# Patient Record
Sex: MALE | Race: WHITE | NOT HISPANIC OR LATINO | Employment: OTHER | URBAN - METROPOLITAN AREA
[De-identification: names, ages, dates, MRNs, and addresses within clinical notes are randomized per-mention and may not be internally consistent; named-entity substitution may affect disease eponyms.]

---

## 2017-02-21 ENCOUNTER — ALLSCRIPTS OFFICE VISIT (OUTPATIENT)
Dept: OTHER | Facility: OTHER | Age: 82
End: 2017-02-21

## 2017-08-28 ENCOUNTER — ALLSCRIPTS OFFICE VISIT (OUTPATIENT)
Dept: OTHER | Facility: OTHER | Age: 82
End: 2017-08-28

## 2017-12-24 ENCOUNTER — HOSPITAL ENCOUNTER (EMERGENCY)
Facility: HOSPITAL | Age: 82
Discharge: HOME | End: 2017-12-24
Attending: EMERGENCY MEDICINE
Payer: COMMERCIAL

## 2017-12-24 VITALS
OXYGEN SATURATION: 98 % | DIASTOLIC BLOOD PRESSURE: 84 MMHG | SYSTOLIC BLOOD PRESSURE: 132 MMHG | TEMPERATURE: 98 F | RESPIRATION RATE: 76 BRPM
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
## 2017-12-24 DIAGNOSIS — T14.8XXA ABRASION: ICD-10-CM

## 2017-12-24 PROCEDURE — 99284 EMERGENCY DEPT VISIT MOD MDM: CPT
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
## 2018-01-01 ENCOUNTER — OFFICE VISIT (OUTPATIENT)
Dept: FAMILY MEDICINE CLINIC | Facility: CLINIC | Age: 83
End: 2018-01-01
Payer: MEDICARE

## 2018-01-01 VITALS
TEMPERATURE: 97.6 F | RESPIRATION RATE: 22 BRPM | HEART RATE: 84 BPM | SYSTOLIC BLOOD PRESSURE: 120 MMHG | WEIGHT: 186 LBS | HEIGHT: 65 IN | DIASTOLIC BLOOD PRESSURE: 70 MMHG | BODY MASS INDEX: 30.99 KG/M2

## 2018-01-01 DIAGNOSIS — F51.01 PRIMARY INSOMNIA: ICD-10-CM

## 2018-01-01 PROCEDURE — 99213 OFFICE O/P EST LOW 20 MIN: CPT | Performed by: FAMILY MEDICINE

## 2018-01-01 RX ORDER — MIRTAZAPINE 30 MG/1
30 TABLET, FILM COATED ORAL
Qty: 30 TABLET | Refills: 0 | Status: CANCELLED | OUTPATIENT
Start: 2018-01-01

## 2018-01-01 RX ORDER — MIRTAZAPINE 30 MG/1
30 TABLET, FILM COATED ORAL
Qty: 30 TABLET | Refills: 0 | Status: SHIPPED | OUTPATIENT
Start: 2018-01-01 | End: 2018-01-01 | Stop reason: SDUPTHER

## 2018-01-01 RX ORDER — MIRTAZAPINE 30 MG/1
30 TABLET, FILM COATED ORAL
Qty: 30 TABLET | Refills: 6 | Status: SHIPPED | OUTPATIENT
Start: 2018-01-01 | End: 2019-01-01 | Stop reason: SDUPTHER

## 2018-01-01 RX ORDER — MIRTAZAPINE 30 MG/1
TABLET, FILM COATED ORAL
Qty: 30 TABLET | Refills: 0 | Status: CANCELLED | OUTPATIENT
Start: 2018-01-01

## 2018-01-01 RX ORDER — QUETIAPINE FUMARATE 100 MG/1
100 TABLET, FILM COATED ORAL
Qty: 30 TABLET | Refills: 0 | Status: CANCELLED | OUTPATIENT
Start: 2018-01-01

## 2018-01-01 RX ORDER — QUETIAPINE FUMARATE 100 MG/1
100 TABLET, FILM COATED ORAL
Qty: 30 TABLET | Refills: 0 | Status: SHIPPED | OUTPATIENT
Start: 2018-01-01 | End: 2018-01-01 | Stop reason: SDUPTHER

## 2018-01-01 RX ORDER — QUETIAPINE FUMARATE 100 MG/1
100 TABLET, FILM COATED ORAL
Qty: 30 TABLET | Refills: 6 | Status: SHIPPED | OUTPATIENT
Start: 2018-01-01 | End: 2019-01-01 | Stop reason: SDUPTHER

## 2018-01-01 RX ORDER — QUETIAPINE FUMARATE 100 MG/1
TABLET, FILM COATED ORAL
Qty: 30 TABLET | Refills: 0 | Status: CANCELLED | OUTPATIENT
Start: 2018-01-01

## 2018-01-13 VITALS
HEIGHT: 65 IN | SYSTOLIC BLOOD PRESSURE: 142 MMHG | BODY MASS INDEX: 30.05 KG/M2 | RESPIRATION RATE: 20 BRPM | TEMPERATURE: 97 F | DIASTOLIC BLOOD PRESSURE: 88 MMHG | HEART RATE: 92 BPM | WEIGHT: 180.38 LBS

## 2018-01-14 VITALS
WEIGHT: 183 LBS | RESPIRATION RATE: 22 BRPM | DIASTOLIC BLOOD PRESSURE: 90 MMHG | SYSTOLIC BLOOD PRESSURE: 140 MMHG | HEIGHT: 65 IN | BODY MASS INDEX: 30.49 KG/M2 | TEMPERATURE: 97.6 F | HEART RATE: 88 BPM

## 2018-02-13 ENCOUNTER — OFFICE VISIT (OUTPATIENT)
Dept: FAMILY MEDICINE CLINIC | Facility: CLINIC | Age: 83
End: 2018-02-13
Payer: COMMERCIAL

## 2018-02-13 VITALS
SYSTOLIC BLOOD PRESSURE: 130 MMHG | BODY MASS INDEX: 30.95 KG/M2 | HEART RATE: 88 BPM | TEMPERATURE: 97.9 F | WEIGHT: 186 LBS | DIASTOLIC BLOOD PRESSURE: 80 MMHG | RESPIRATION RATE: 20 BRPM

## 2018-02-13 DIAGNOSIS — H61.23 BILATERAL IMPACTED CERUMEN: Primary | ICD-10-CM

## 2018-02-13 PROCEDURE — 99213 OFFICE O/P EST LOW 20 MIN: CPT | Performed by: FAMILY MEDICINE

## 2018-02-13 RX ORDER — MIRTAZAPINE 30 MG/1
TABLET, FILM COATED ORAL
Refills: 0 | COMMUNITY
Start: 2018-01-24 | End: 2018-03-26 | Stop reason: SDUPTHER

## 2018-02-13 RX ORDER — QUETIAPINE FUMARATE 100 MG/1
TABLET, FILM COATED ORAL
Refills: 0 | COMMUNITY
Start: 2018-01-24 | End: 2018-03-26 | Stop reason: SDUPTHER

## 2018-02-13 NOTE — PROGRESS NOTES
Subjective:      Eboni Ya is a 81 y o  male whom I am asked to see for evaluation of diminished hearing in both ears for the past 3 days  There is a prior history of cerumen impaction  The patient has not been using ear drops to loosen wax immediately prior to this visit  The patient denies ear pain  The following portions of the patient's history were reviewed and updated as appropriate: allergies, current medications, past family history, past medical history, past social history, past surgical history and problem list     Review of Systems  Constitutional: negative  Eyes: negative  Ears, nose, mouth, throat, and face: negative for ear drainage, earaches, nasal congestion, sore throat and voice change  Respiratory: negative      Objective: Auditory canal(s) of both ears are completely obstructed with cerumen  Cerumen was removed using gentle irrigation  Tympanic membranes are intact following the procedure -  On L side, R TM is not visible since ear flush was unsuccessful  Auditory canals are normal       Assessment:      Cerumen Impaction without otitis externa  Plan:      1  Care instructions given  2  Home treatment: [facility]  5 dropps in R ear 3 times a day for 3 days   3  Follow-up as needed

## 2018-03-26 DIAGNOSIS — F51.01 PRIMARY INSOMNIA: Primary | ICD-10-CM

## 2018-03-26 RX ORDER — QUETIAPINE FUMARATE 100 MG/1
100 TABLET, FILM COATED ORAL
Qty: 30 TABLET | Refills: 6 | Status: SHIPPED | OUTPATIENT
Start: 2018-03-26 | End: 2018-01-01 | Stop reason: SDUPTHER

## 2018-03-26 RX ORDER — MIRTAZAPINE 30 MG/1
30 TABLET, FILM COATED ORAL
Qty: 30 TABLET | Refills: 6 | Status: SHIPPED | OUTPATIENT
Start: 2018-03-26 | End: 2018-01-01 | Stop reason: SDUPTHER

## 2018-12-18 NOTE — PROGRESS NOTES
Chief Complaint   Patient presents with    Follow-up     chronic conditions        Patient ID: Shelley Hahn is a 81 y o  male  HPI  Pt is seeing for f/u Insomnia -  Stable on meds, declined labs/fluvaccine/ prevnar 13     The following portions of the patient's history were reviewed and updated as appropriate: allergies, current medications, past family history, past medical history, past social history, past surgical history and problem list     Review of Systems   Constitutional: Negative  Respiratory: Negative  Cardiovascular: Negative  Gastrointestinal: Negative  Genitourinary: Negative  Musculoskeletal: Negative  Skin: Negative  Neurological: Negative  Psychiatric/Behavioral: Positive for sleep disturbance  Negative for dysphoric mood  The patient is not nervous/anxious  Current Outpatient Prescriptions   Medication Sig Dispense Refill    mirtazapine (REMERON) 30 mg tablet Take 1 tablet (30 mg total) by mouth daily at bedtime 30 tablet 6    QUEtiapine (SEROquel) 100 mg tablet Take 1 tablet (100 mg total) by mouth daily at bedtime 30 tablet 6     No current facility-administered medications for this visit  Objective:    /70 (BP Location: Right arm, Patient Position: Sitting, Cuff Size: Standard)   Pulse 84   Temp 97 6 °F (36 4 °C) (Tympanic)   Resp 22   Ht 5' 5" (1 651 m)   Wt 84 4 kg (186 lb)   BMI 30 95 kg/m²        Physical Exam   Constitutional: No distress  Cardiovascular: Normal rate and regular rhythm  No murmur heard  Pulmonary/Chest: Effort normal  No respiratory distress  He has no wheezes  He has no rales  Neurological: No cranial nerve deficit  Psychiatric: He has a normal mood and affect  Assessment/Plan:         Diagnoses and all orders for this visit:    Primary insomnia  -     mirtazapine (REMERON) 30 mg tablet; Take 1 tablet (30 mg total) by mouth daily at bedtime  -     QUEtiapine (SEROquel) 100 mg tablet;  Take 1 tablet (100 mg total) by mouth daily at bedtime          Stable    rto in 6  m                   Leydi Chavez MD

## 2019-01-01 ENCOUNTER — APPOINTMENT (INPATIENT)
Dept: RADIOLOGY | Facility: HOSPITAL | Age: 84
DRG: 064 | End: 2019-01-01
Payer: MEDICARE

## 2019-01-01 ENCOUNTER — APPOINTMENT (EMERGENCY)
Dept: RADIOLOGY | Facility: HOSPITAL | Age: 84
DRG: 064 | End: 2019-01-01
Payer: MEDICARE

## 2019-01-01 ENCOUNTER — HOSPITAL ENCOUNTER (OUTPATIENT)
Facility: HOSPITAL | Age: 84
End: 2019-07-29
Attending: STUDENT IN AN ORGANIZED HEALTH CARE EDUCATION/TRAINING PROGRAM | Admitting: STUDENT IN AN ORGANIZED HEALTH CARE EDUCATION/TRAINING PROGRAM

## 2019-01-01 ENCOUNTER — APPOINTMENT (INPATIENT)
Dept: NON INVASIVE DIAGNOSTICS | Facility: HOSPITAL | Age: 84
DRG: 064 | End: 2019-01-01
Payer: MEDICARE

## 2019-01-01 ENCOUNTER — EPISODE CHANGES (OUTPATIENT)
Dept: CASE MANAGEMENT | Facility: HOSPITAL | Age: 84
End: 2019-01-01

## 2019-01-01 ENCOUNTER — TELEPHONE (OUTPATIENT)
Dept: VASCULAR SURGERY | Facility: CLINIC | Age: 84
End: 2019-01-01

## 2019-01-01 ENCOUNTER — APPOINTMENT (INPATIENT)
Dept: RADIOLOGY | Facility: HOSPITAL | Age: 84
DRG: 064 | End: 2019-01-01
Attending: INTERNAL MEDICINE
Payer: MEDICARE

## 2019-01-01 ENCOUNTER — HOSPITAL ENCOUNTER (INPATIENT)
Facility: HOSPITAL | Age: 84
LOS: 9 days | DRG: 064 | End: 2019-07-26
Attending: EMERGENCY MEDICINE | Admitting: ANESTHESIOLOGY
Payer: MEDICARE

## 2019-01-01 VITALS
TEMPERATURE: 98.8 F | RESPIRATION RATE: 36 BRPM | WEIGHT: 175 LBS | HEART RATE: 89 BPM | HEIGHT: 70 IN | OXYGEN SATURATION: 89 % | DIASTOLIC BLOOD PRESSURE: 65 MMHG | SYSTOLIC BLOOD PRESSURE: 111 MMHG | BODY MASS INDEX: 25.05 KG/M2

## 2019-01-01 VITALS
DIASTOLIC BLOOD PRESSURE: 81 MMHG | SYSTOLIC BLOOD PRESSURE: 127 MMHG | TEMPERATURE: 97.5 F | HEART RATE: 83 BPM | OXYGEN SATURATION: 97 % | HEIGHT: 66 IN | BODY MASS INDEX: 28.12 KG/M2 | RESPIRATION RATE: 20 BRPM | WEIGHT: 175 LBS

## 2019-01-01 DIAGNOSIS — I63.311 CEREBROVASCULAR ACCIDENT (CVA) DUE TO THROMBOSIS OF RIGHT MIDDLE CEREBRAL ARTERY (HCC): ICD-10-CM

## 2019-01-01 DIAGNOSIS — I63.9 CVA (CEREBRAL VASCULAR ACCIDENT) (HCC): Primary | ICD-10-CM

## 2019-01-01 DIAGNOSIS — I65.29 CAROTID STENOSIS: ICD-10-CM

## 2019-01-01 DIAGNOSIS — Z43.1 ENCOUNTER FOR PEG (PERCUTANEOUS ENDOSCOPIC GASTROSTOMY) (HCC): ICD-10-CM

## 2019-01-01 DIAGNOSIS — J96.01 ACUTE RESPIRATORY FAILURE WITH HYPOXIA (HCC): ICD-10-CM

## 2019-01-01 DIAGNOSIS — I48.0 PAROXYSMAL ATRIAL FIBRILLATION (HCC): ICD-10-CM

## 2019-01-01 DIAGNOSIS — I10 HIGH BLOOD PRESSURE: ICD-10-CM

## 2019-01-01 DIAGNOSIS — S32.511A CLOSED FRACTURE OF RIGHT SUPERIOR PUBIC RAMUS (HCC): ICD-10-CM

## 2019-01-01 DIAGNOSIS — F51.01 PRIMARY INSOMNIA: ICD-10-CM

## 2019-01-01 DIAGNOSIS — A41.9 SEPSIS (HCC): ICD-10-CM

## 2019-01-01 DIAGNOSIS — R13.10 IMPAIRED SWALLOWING: ICD-10-CM

## 2019-01-01 LAB
ABO GROUP BLD: NORMAL
ALBUMIN SERPL BCP-MCNC: 2.2 G/DL (ref 3.5–5)
ALBUMIN SERPL BCP-MCNC: 2.7 G/DL (ref 3.5–5)
ALBUMIN SERPL BCP-MCNC: 3 G/DL (ref 3.5–5)
ALBUMIN SERPL BCP-MCNC: 3.2 G/DL (ref 3.5–5)
ALP SERPL-CCNC: 68 U/L (ref 46–116)
ALP SERPL-CCNC: 76 U/L (ref 46–116)
ALP SERPL-CCNC: 88 U/L (ref 46–116)
ALP SERPL-CCNC: 92 U/L (ref 46–116)
ALT SERPL W P-5'-P-CCNC: 20 U/L (ref 12–78)
ALT SERPL W P-5'-P-CCNC: 24 U/L (ref 12–78)
ALT SERPL W P-5'-P-CCNC: 35 U/L (ref 12–78)
ALT SERPL W P-5'-P-CCNC: 38 U/L (ref 12–78)
ANION GAP SERPL CALCULATED.3IONS-SCNC: 10 MMOL/L (ref 4–13)
ANION GAP SERPL CALCULATED.3IONS-SCNC: 11 MMOL/L (ref 4–13)
ANION GAP SERPL CALCULATED.3IONS-SCNC: 11 MMOL/L (ref 4–13)
ANION GAP SERPL CALCULATED.3IONS-SCNC: 13 MMOL/L (ref 4–13)
ANION GAP SERPL CALCULATED.3IONS-SCNC: 15 MMOL/L (ref 4–13)
ANION GAP SERPL CALCULATED.3IONS-SCNC: 8 MMOL/L (ref 4–13)
ANION GAP SERPL CALCULATED.3IONS-SCNC: 9 MMOL/L (ref 4–13)
APTT PPP: 25 SECONDS (ref 24–33)
APTT PPP: 27 SECONDS (ref 23–37)
APTT PPP: 27 SECONDS (ref 24–33)
APTT PPP: 29 SECONDS (ref 24–33)
APTT PPP: 30 SECONDS (ref 23–37)
APTT PPP: 30 SECONDS (ref 24–33)
APTT PPP: 31 SECONDS (ref 23–37)
APTT PPP: 31 SECONDS (ref 24–33)
APTT PPP: 34 SECONDS (ref 23–37)
APTT PPP: 36 SECONDS (ref 23–37)
APTT PPP: 36 SECONDS (ref 24–33)
APTT PPP: 37 SECONDS (ref 24–33)
APTT PPP: 37 SECONDS (ref 24–33)
APTT PPP: 38 SECONDS (ref 23–37)
APTT PPP: 40 SECONDS (ref 23–37)
APTT PPP: 55 SECONDS (ref 23–37)
APTT PPP: 70 SECONDS (ref 23–37)
APTT PPP: 78 SECONDS (ref 24–33)
APTT PPP: 79 SECONDS (ref 23–37)
AST SERPL W P-5'-P-CCNC: 49 U/L (ref 5–45)
AST SERPL W P-5'-P-CCNC: 72 U/L (ref 5–45)
AST SERPL W P-5'-P-CCNC: 74 U/L (ref 5–45)
AST SERPL W P-5'-P-CCNC: 78 U/L (ref 5–45)
ATRIAL RATE: 74 BPM
ATRIAL RATE: 98 BPM
BACTERIA BLD CULT: NORMAL
BACTERIA BLD CULT: NORMAL
BACTERIA UR QL AUTO: ABNORMAL /HPF
BACTERIA UR QL AUTO: ABNORMAL /HPF
BASOPHILS # BLD AUTO: 0.03 THOUSANDS/ΜL (ref 0–0.1)
BASOPHILS # BLD AUTO: 0.04 THOUSANDS/ΜL (ref 0–0.1)
BASOPHILS # BLD AUTO: 0.06 THOUSANDS/ΜL (ref 0–0.1)
BASOPHILS # BLD AUTO: 0.07 THOUSANDS/ΜL (ref 0–0.1)
BASOPHILS # BLD AUTO: 0.08 THOUSANDS/ΜL (ref 0–0.1)
BASOPHILS # BLD MANUAL: 0 THOUSAND/UL (ref 0–0.1)
BASOPHILS # BLD MANUAL: 0.17 THOUSAND/UL (ref 0–0.1)
BASOPHILS NFR BLD AUTO: 0 % (ref 0–1)
BASOPHILS NFR BLD AUTO: 1 % (ref 0–1)
BASOPHILS NFR MAR MANUAL: 0 % (ref 0–1)
BASOPHILS NFR MAR MANUAL: 2 % (ref 0–1)
BILIRUB DIRECT SERPL-MCNC: 0.3 MG/DL (ref 0–0.2)
BILIRUB SERPL-MCNC: 0.4 MG/DL (ref 0.2–1)
BILIRUB SERPL-MCNC: 0.7 MG/DL (ref 0.2–1)
BILIRUB SERPL-MCNC: 0.8 MG/DL (ref 0.2–1)
BILIRUB SERPL-MCNC: 0.9 MG/DL (ref 0.2–1)
BILIRUB UR QL STRIP: NEGATIVE
BILIRUB UR QL STRIP: NEGATIVE
BLD GP AB SCN SERPL QL: NEGATIVE
BUN SERPL-MCNC: 16 MG/DL (ref 5–25)
BUN SERPL-MCNC: 16 MG/DL (ref 5–25)
BUN SERPL-MCNC: 17 MG/DL (ref 5–25)
BUN SERPL-MCNC: 17 MG/DL (ref 5–25)
BUN SERPL-MCNC: 19 MG/DL (ref 5–25)
BUN SERPL-MCNC: 20 MG/DL (ref 5–25)
BUN SERPL-MCNC: 21 MG/DL (ref 5–25)
CALCIUM SERPL-MCNC: 7.7 MG/DL (ref 8.3–10.1)
CALCIUM SERPL-MCNC: 7.9 MG/DL (ref 8.3–10.1)
CALCIUM SERPL-MCNC: 7.9 MG/DL (ref 8.3–10.1)
CALCIUM SERPL-MCNC: 8 MG/DL (ref 8.3–10.1)
CALCIUM SERPL-MCNC: 8.2 MG/DL (ref 8.3–10.1)
CALCIUM SERPL-MCNC: 8.3 MG/DL (ref 8.3–10.1)
CALCIUM SERPL-MCNC: 8.5 MG/DL (ref 8.3–10.1)
CALCIUM SERPL-MCNC: 8.6 MG/DL (ref 8.3–10.1)
CALCIUM SERPL-MCNC: 8.7 MG/DL (ref 8.3–10.1)
CALCIUM SERPL-MCNC: 8.9 MG/DL (ref 8.3–10.1)
CHLORIDE SERPL-SCNC: 104 MMOL/L (ref 100–108)
CHLORIDE SERPL-SCNC: 104 MMOL/L (ref 100–108)
CHLORIDE SERPL-SCNC: 105 MMOL/L (ref 100–108)
CHLORIDE SERPL-SCNC: 107 MMOL/L (ref 100–108)
CHLORIDE SERPL-SCNC: 107 MMOL/L (ref 100–108)
CHLORIDE SERPL-SCNC: 108 MMOL/L (ref 100–108)
CHLORIDE SERPL-SCNC: 108 MMOL/L (ref 100–108)
CHLORIDE SERPL-SCNC: 110 MMOL/L (ref 100–108)
CHLORIDE SERPL-SCNC: 110 MMOL/L (ref 100–108)
CHLORIDE SERPL-SCNC: 111 MMOL/L (ref 100–108)
CHOLEST SERPL-MCNC: 204 MG/DL (ref 50–200)
CK MB SERPL-MCNC: 0.8 NG/ML (ref 0–5)
CK MB SERPL-MCNC: 1.4 NG/ML (ref 0–5)
CK MB SERPL-MCNC: 1.6 NG/ML (ref 0–5)
CK MB SERPL-MCNC: 1.9 NG/ML (ref 0–5)
CK MB SERPL-MCNC: 10.8 NG/ML (ref 0–5)
CK MB SERPL-MCNC: 12.2 NG/ML (ref 0–5)
CK MB SERPL-MCNC: 2.3 NG/ML (ref 0–5)
CK MB SERPL-MCNC: 2.6 NG/ML (ref 0–5)
CK MB SERPL-MCNC: 4.8 NG/ML (ref 0–5)
CK MB SERPL-MCNC: <1 % (ref 0–2.5)
CK MB SERPL-MCNC: <1 NG/ML (ref 0–5)
CK SERPL-CCNC: 1153 U/L (ref 39–308)
CK SERPL-CCNC: 2071 U/L (ref 39–308)
CK SERPL-CCNC: 214 U/L (ref 39–308)
CK SERPL-CCNC: 2506 U/L (ref 39–308)
CK SERPL-CCNC: 263 U/L (ref 39–308)
CK SERPL-CCNC: 3324 U/L (ref 39–308)
CK SERPL-CCNC: 5381 U/L (ref 39–308)
CK SERPL-CCNC: 678 U/L (ref 39–308)
CK SERPL-CCNC: 6896 U/L (ref 39–308)
CK SERPL-CCNC: 850 U/L (ref 39–308)
CLARITY UR: ABNORMAL
CLARITY UR: ABNORMAL
CO2 SERPL-SCNC: 18 MMOL/L (ref 21–32)
CO2 SERPL-SCNC: 19 MMOL/L (ref 21–32)
CO2 SERPL-SCNC: 21 MMOL/L (ref 21–32)
CO2 SERPL-SCNC: 22 MMOL/L (ref 21–32)
CO2 SERPL-SCNC: 23 MMOL/L (ref 21–32)
CO2 SERPL-SCNC: 24 MMOL/L (ref 21–32)
CO2 SERPL-SCNC: 24 MMOL/L (ref 21–32)
CO2 SERPL-SCNC: 25 MMOL/L (ref 21–32)
COLOR UR: YELLOW
COLOR UR: YELLOW
CREAT SERPL-MCNC: 0.95 MG/DL (ref 0.6–1.3)
CREAT SERPL-MCNC: 0.96 MG/DL (ref 0.6–1.3)
CREAT SERPL-MCNC: 0.96 MG/DL (ref 0.6–1.3)
CREAT SERPL-MCNC: 0.98 MG/DL (ref 0.6–1.3)
CREAT SERPL-MCNC: 1.02 MG/DL (ref 0.6–1.3)
CREAT SERPL-MCNC: 1.07 MG/DL (ref 0.6–1.3)
CREAT SERPL-MCNC: 1.12 MG/DL (ref 0.6–1.3)
CREAT SERPL-MCNC: 1.21 MG/DL (ref 0.6–1.3)
CREAT SERPL-MCNC: 1.42 MG/DL (ref 0.6–1.3)
CREAT SERPL-MCNC: 1.55 MG/DL (ref 0.6–1.3)
EOSINOPHIL # BLD AUTO: 0 THOUSAND/ΜL (ref 0–0.61)
EOSINOPHIL # BLD AUTO: 0 THOUSAND/ΜL (ref 0–0.61)
EOSINOPHIL # BLD AUTO: 0.03 THOUSAND/ΜL (ref 0–0.61)
EOSINOPHIL # BLD AUTO: 0.03 THOUSAND/ΜL (ref 0–0.61)
EOSINOPHIL # BLD AUTO: 0.08 THOUSAND/ΜL (ref 0–0.61)
EOSINOPHIL # BLD AUTO: 0.11 THOUSAND/ΜL (ref 0–0.61)
EOSINOPHIL # BLD AUTO: 0.45 THOUSAND/ΜL (ref 0–0.61)
EOSINOPHIL # BLD MANUAL: 0.17 THOUSAND/UL (ref 0–0.4)
EOSINOPHIL # BLD MANUAL: 0.33 THOUSAND/UL (ref 0–0.4)
EOSINOPHIL NFR BLD AUTO: 0 % (ref 0–6)
EOSINOPHIL NFR BLD AUTO: 1 % (ref 0–6)
EOSINOPHIL NFR BLD AUTO: 1 % (ref 0–6)
EOSINOPHIL NFR BLD AUTO: 5 % (ref 0–6)
EOSINOPHIL NFR BLD MANUAL: 1 % (ref 0–6)
EOSINOPHIL NFR BLD MANUAL: 4 % (ref 0–6)
ERYTHROCYTE [DISTWIDTH] IN BLOOD BY AUTOMATED COUNT: 15.8 % (ref 11.6–15.1)
ERYTHROCYTE [DISTWIDTH] IN BLOOD BY AUTOMATED COUNT: 15.9 % (ref 11.6–15.1)
ERYTHROCYTE [DISTWIDTH] IN BLOOD BY AUTOMATED COUNT: 15.9 % (ref 11.6–15.1)
ERYTHROCYTE [DISTWIDTH] IN BLOOD BY AUTOMATED COUNT: 16.1 % (ref 11.6–15.1)
ERYTHROCYTE [DISTWIDTH] IN BLOOD BY AUTOMATED COUNT: 16.2 % (ref 11.6–15.1)
ERYTHROCYTE [DISTWIDTH] IN BLOOD BY AUTOMATED COUNT: 16.3 % (ref 11.6–15.1)
ERYTHROCYTE [DISTWIDTH] IN BLOOD BY AUTOMATED COUNT: 16.4 % (ref 11.6–15.1)
ERYTHROCYTE [DISTWIDTH] IN BLOOD BY AUTOMATED COUNT: 16.6 % (ref 11.6–15.1)
ERYTHROCYTE [DISTWIDTH] IN BLOOD BY AUTOMATED COUNT: 16.7 % (ref 11.6–15.1)
EST. AVERAGE GLUCOSE BLD GHB EST-MCNC: 126 MG/DL
GFR SERPL CREATININE-BSD FRML MDRD: 41 ML/MIN/1.73SQ M
GFR SERPL CREATININE-BSD FRML MDRD: 46 ML/MIN/1.73SQ M
GFR SERPL CREATININE-BSD FRML MDRD: 55 ML/MIN/1.73SQ M
GFR SERPL CREATININE-BSD FRML MDRD: 61 ML/MIN/1.73SQ M
GFR SERPL CREATININE-BSD FRML MDRD: 64 ML/MIN/1.73SQ M
GFR SERPL CREATININE-BSD FRML MDRD: 68 ML/MIN/1.73SQ M
GFR SERPL CREATININE-BSD FRML MDRD: 72 ML/MIN/1.73SQ M
GFR SERPL CREATININE-BSD FRML MDRD: 73 ML/MIN/1.73SQ M
GFR SERPL CREATININE-BSD FRML MDRD: 73 ML/MIN/1.73SQ M
GFR SERPL CREATININE-BSD FRML MDRD: 74 ML/MIN/1.73SQ M
GLUCOSE SERPL-MCNC: 104 MG/DL (ref 65–140)
GLUCOSE SERPL-MCNC: 105 MG/DL (ref 65–140)
GLUCOSE SERPL-MCNC: 106 MG/DL (ref 65–140)
GLUCOSE SERPL-MCNC: 109 MG/DL (ref 65–140)
GLUCOSE SERPL-MCNC: 111 MG/DL (ref 65–140)
GLUCOSE SERPL-MCNC: 115 MG/DL (ref 65–140)
GLUCOSE SERPL-MCNC: 122 MG/DL (ref 65–140)
GLUCOSE SERPL-MCNC: 132 MG/DL (ref 65–140)
GLUCOSE SERPL-MCNC: 135 MG/DL (ref 65–140)
GLUCOSE SERPL-MCNC: 37 MG/DL (ref 65–140)
GLUCOSE SERPL-MCNC: 99 MG/DL (ref 65–140)
GLUCOSE UR STRIP-MCNC: NEGATIVE MG/DL
GLUCOSE UR STRIP-MCNC: NEGATIVE MG/DL
HBA1C MFR BLD: 6 % (ref 4.2–6.3)
HCT VFR BLD AUTO: 31.1 % (ref 36.5–49.3)
HCT VFR BLD AUTO: 31.6 % (ref 36.5–49.3)
HCT VFR BLD AUTO: 31.8 % (ref 36.5–49.3)
HCT VFR BLD AUTO: 33 % (ref 36.5–49.3)
HCT VFR BLD AUTO: 33.2 % (ref 36.5–49.3)
HCT VFR BLD AUTO: 33.6 % (ref 36.5–49.3)
HCT VFR BLD AUTO: 33.7 % (ref 36.5–49.3)
HCT VFR BLD AUTO: 34.5 % (ref 36.5–49.3)
HCT VFR BLD AUTO: 35.3 % (ref 36.5–49.3)
HCT VFR BLD AUTO: 35.4 % (ref 36.5–49.3)
HCT VFR BLD AUTO: 38.3 % (ref 36.5–49.3)
HDLC SERPL-MCNC: 47 MG/DL (ref 40–60)
HGB BLD-MCNC: 10.2 G/DL (ref 12–17)
HGB BLD-MCNC: 10.3 G/DL (ref 12–17)
HGB BLD-MCNC: 10.3 G/DL (ref 12–17)
HGB BLD-MCNC: 10.4 G/DL (ref 12–17)
HGB BLD-MCNC: 10.8 G/DL (ref 12–17)
HGB BLD-MCNC: 10.8 G/DL (ref 12–17)
HGB BLD-MCNC: 11.1 G/DL (ref 12–17)
HGB BLD-MCNC: 11.2 G/DL (ref 12–17)
HGB BLD-MCNC: 11.3 G/DL (ref 12–17)
HGB BLD-MCNC: 11.3 G/DL (ref 12–17)
HGB BLD-MCNC: 12.3 G/DL (ref 12–17)
HGB UR QL STRIP.AUTO: ABNORMAL
HGB UR QL STRIP.AUTO: ABNORMAL
IMM GRANULOCYTES # BLD AUTO: 0.06 THOUSAND/UL (ref 0–0.2)
IMM GRANULOCYTES # BLD AUTO: 0.08 THOUSAND/UL (ref 0–0.2)
IMM GRANULOCYTES # BLD AUTO: 0.1 THOUSAND/UL (ref 0–0.2)
IMM GRANULOCYTES # BLD AUTO: 0.11 THOUSAND/UL (ref 0–0.2)
IMM GRANULOCYTES # BLD AUTO: 0.22 THOUSAND/UL (ref 0–0.2)
IMM GRANULOCYTES # BLD AUTO: >0.5 THOUSAND/UL (ref 0–0.2)
IMM GRANULOCYTES # BLD AUTO: >0.5 THOUSAND/UL (ref 0–0.2)
IMM GRANULOCYTES NFR BLD AUTO: 1 % (ref 0–2)
IMM GRANULOCYTES NFR BLD AUTO: 2 % (ref 0–2)
IMM GRANULOCYTES NFR BLD AUTO: 6 % (ref 0–2)
IMM GRANULOCYTES NFR BLD AUTO: 6 % (ref 0–2)
INR PPP: 1.05 (ref 0.86–1.16)
KETONES UR STRIP-MCNC: NEGATIVE MG/DL
KETONES UR STRIP-MCNC: NEGATIVE MG/DL
LACTATE SERPL-SCNC: 1.2 MMOL/L (ref 0.5–2)
LACTATE SERPL-SCNC: 1.6 MMOL/L (ref 0.5–2)
LACTATE SERPL-SCNC: 2.8 MMOL/L (ref 0.5–2)
LACTATE SERPL-SCNC: 3.3 MMOL/L (ref 0.5–2)
LDLC SERPL CALC-MCNC: 140 MG/DL (ref 0–100)
LEUKOCYTE ESTERASE UR QL STRIP: ABNORMAL
LEUKOCYTE ESTERASE UR QL STRIP: NEGATIVE
LIPASE SERPL-CCNC: 68 U/L (ref 73–393)
LYMPHOCYTES # BLD AUTO: 0.63 THOUSANDS/ΜL (ref 0.6–4.47)
LYMPHOCYTES # BLD AUTO: 1.2 THOUSANDS/ΜL (ref 0.6–4.47)
LYMPHOCYTES # BLD AUTO: 1.24 THOUSANDS/ΜL (ref 0.6–4.47)
LYMPHOCYTES # BLD AUTO: 1.35 THOUSANDS/ΜL (ref 0.6–4.47)
LYMPHOCYTES # BLD AUTO: 1.42 THOUSANDS/ΜL (ref 0.6–4.47)
LYMPHOCYTES # BLD AUTO: 1.47 THOUSANDS/ΜL (ref 0.6–4.47)
LYMPHOCYTES # BLD AUTO: 1.69 THOUSANDS/ΜL (ref 0.6–4.47)
LYMPHOCYTES # BLD AUTO: 1.82 THOUSAND/UL (ref 0.6–4.47)
LYMPHOCYTES # BLD AUTO: 1.99 THOUSAND/UL (ref 0.6–4.47)
LYMPHOCYTES # BLD AUTO: 11 % (ref 14–44)
LYMPHOCYTES # BLD AUTO: 24 % (ref 14–44)
LYMPHOCYTES NFR BLD AUTO: 10 % (ref 14–44)
LYMPHOCYTES NFR BLD AUTO: 12 % (ref 14–44)
LYMPHOCYTES NFR BLD AUTO: 14 % (ref 14–44)
LYMPHOCYTES NFR BLD AUTO: 16 % (ref 14–44)
LYMPHOCYTES NFR BLD AUTO: 18 % (ref 14–44)
LYMPHOCYTES NFR BLD AUTO: 4 % (ref 14–44)
LYMPHOCYTES NFR BLD AUTO: 9 % (ref 14–44)
MAGNESIUM SERPL-MCNC: 1.8 MG/DL (ref 1.6–2.6)
MAGNESIUM SERPL-MCNC: 1.9 MG/DL (ref 1.6–2.6)
MAGNESIUM SERPL-MCNC: 2 MG/DL (ref 1.6–2.6)
MAGNESIUM SERPL-MCNC: 2.3 MG/DL (ref 1.6–2.6)
MAGNESIUM SERPL-MCNC: 2.4 MG/DL (ref 1.6–2.6)
MCH RBC QN AUTO: 30.4 PG (ref 26.8–34.3)
MCH RBC QN AUTO: 30.5 PG (ref 26.8–34.3)
MCH RBC QN AUTO: 30.6 PG (ref 26.8–34.3)
MCH RBC QN AUTO: 30.7 PG (ref 26.8–34.3)
MCH RBC QN AUTO: 30.9 PG (ref 26.8–34.3)
MCH RBC QN AUTO: 31 PG (ref 26.8–34.3)
MCH RBC QN AUTO: 31.2 PG (ref 26.8–34.3)
MCHC RBC AUTO-ENTMCNC: 31.5 G/DL (ref 31.4–37.4)
MCHC RBC AUTO-ENTMCNC: 31.9 G/DL (ref 31.4–37.4)
MCHC RBC AUTO-ENTMCNC: 32 G/DL (ref 31.4–37.4)
MCHC RBC AUTO-ENTMCNC: 32 G/DL (ref 31.4–37.4)
MCHC RBC AUTO-ENTMCNC: 32.1 G/DL (ref 31.4–37.4)
MCHC RBC AUTO-ENTMCNC: 32.3 G/DL (ref 31.4–37.4)
MCHC RBC AUTO-ENTMCNC: 32.4 G/DL (ref 31.4–37.4)
MCHC RBC AUTO-ENTMCNC: 32.5 G/DL (ref 31.4–37.4)
MCHC RBC AUTO-ENTMCNC: 32.5 G/DL (ref 31.4–37.4)
MCHC RBC AUTO-ENTMCNC: 33 G/DL (ref 31.4–37.4)
MCHC RBC AUTO-ENTMCNC: 33.1 G/DL (ref 31.4–37.4)
MCV RBC AUTO: 94 FL (ref 82–98)
MCV RBC AUTO: 94 FL (ref 82–98)
MCV RBC AUTO: 95 FL (ref 82–98)
MCV RBC AUTO: 96 FL (ref 82–98)
MCV RBC AUTO: 97 FL (ref 82–98)
METAMYELOCYTES NFR BLD MANUAL: 1 % (ref 0–1)
MONOCYTES # BLD AUTO: 0.96 THOUSAND/ΜL (ref 0.17–1.22)
MONOCYTES # BLD AUTO: 1 THOUSAND/UL (ref 0–1.22)
MONOCYTES # BLD AUTO: 1.01 THOUSAND/ΜL (ref 0.17–1.22)
MONOCYTES # BLD AUTO: 1.06 THOUSAND/ΜL (ref 0.17–1.22)
MONOCYTES # BLD AUTO: 1.18 THOUSAND/ΜL (ref 0.17–1.22)
MONOCYTES # BLD AUTO: 1.26 THOUSAND/ΜL (ref 0.17–1.22)
MONOCYTES # BLD AUTO: 1.27 THOUSAND/ΜL (ref 0.17–1.22)
MONOCYTES # BLD AUTO: 1.65 THOUSAND/ΜL (ref 0.17–1.22)
MONOCYTES # BLD AUTO: 1.99 THOUSAND/UL (ref 0–1.22)
MONOCYTES NFR BLD AUTO: 11 % (ref 4–12)
MONOCYTES NFR BLD AUTO: 11 % (ref 4–12)
MONOCYTES NFR BLD AUTO: 12 % (ref 4–12)
MONOCYTES NFR BLD AUTO: 12 % (ref 4–12)
MONOCYTES NFR BLD AUTO: 13 % (ref 4–12)
MONOCYTES NFR BLD AUTO: 7 % (ref 4–12)
MONOCYTES NFR BLD AUTO: 8 % (ref 4–12)
MONOCYTES NFR BLD: 12 % (ref 4–12)
MONOCYTES NFR BLD: 12 % (ref 4–12)
MRSA NOSE QL CULT: NORMAL
MUCOUS THREADS UR QL AUTO: ABNORMAL
MYELOCYTES NFR BLD MANUAL: 1 % (ref 0–1)
NEUTROPHILS # BLD AUTO: 11.23 THOUSANDS/ΜL (ref 1.85–7.62)
NEUTROPHILS # BLD AUTO: 11.51 THOUSANDS/ΜL (ref 1.85–7.62)
NEUTROPHILS # BLD AUTO: 14.35 THOUSANDS/ΜL (ref 1.85–7.62)
NEUTROPHILS # BLD AUTO: 5.54 THOUSANDS/ΜL (ref 1.85–7.62)
NEUTROPHILS # BLD AUTO: 6.23 THOUSANDS/ΜL (ref 1.85–7.62)
NEUTROPHILS # BLD AUTO: 7.03 THOUSANDS/ΜL (ref 1.85–7.62)
NEUTROPHILS # BLD AUTO: 7.3 THOUSANDS/ΜL (ref 1.85–7.62)
NEUTROPHILS # BLD MANUAL: 12.27 THOUSAND/UL (ref 1.85–7.62)
NEUTROPHILS # BLD MANUAL: 4.82 THOUSAND/UL (ref 1.85–7.62)
NEUTS BAND NFR BLD MANUAL: 10 % (ref 0–8)
NEUTS BAND NFR BLD MANUAL: 2 % (ref 0–8)
NEUTS SEG NFR BLD AUTO: 56 % (ref 43–75)
NEUTS SEG NFR BLD AUTO: 63 % (ref 43–75)
NEUTS SEG NFR BLD AUTO: 64 % (ref 43–75)
NEUTS SEG NFR BLD AUTO: 67 % (ref 43–75)
NEUTS SEG NFR BLD AUTO: 71 % (ref 43–75)
NEUTS SEG NFR BLD AUTO: 72 % (ref 43–75)
NEUTS SEG NFR BLD AUTO: 75 % (ref 43–75)
NEUTS SEG NFR BLD AUTO: 78 % (ref 43–75)
NEUTS SEG NFR BLD AUTO: 88 % (ref 43–75)
NITRITE UR QL STRIP: NEGATIVE
NITRITE UR QL STRIP: POSITIVE
NON-SQ EPI CELLS URNS QL MICRO: ABNORMAL /HPF
NON-SQ EPI CELLS URNS QL MICRO: ABNORMAL /HPF
NRBC BLD AUTO-RTO: 0 /100 WBCS
NT-PROBNP SERPL-MCNC: 644 PG/ML
PH UR STRIP.AUTO: 5.5 [PH]
PH UR STRIP.AUTO: 8.5 [PH]
PHOSPHATE SERPL-MCNC: 2.4 MG/DL (ref 2.3–4.1)
PHOSPHATE SERPL-MCNC: 3.1 MG/DL (ref 2.3–4.1)
PHOSPHATE SERPL-MCNC: 3.5 MG/DL (ref 2.3–4.1)
PLATELET # BLD AUTO: 164 THOUSANDS/UL (ref 149–390)
PLATELET # BLD AUTO: 172 THOUSANDS/UL (ref 149–390)
PLATELET # BLD AUTO: 196 THOUSANDS/UL (ref 149–390)
PLATELET # BLD AUTO: 213 THOUSANDS/UL (ref 149–390)
PLATELET # BLD AUTO: 232 THOUSANDS/UL (ref 149–390)
PLATELET # BLD AUTO: 246 THOUSANDS/UL (ref 149–390)
PLATELET # BLD AUTO: 270 THOUSANDS/UL (ref 149–390)
PLATELET # BLD AUTO: 274 THOUSANDS/UL (ref 149–390)
PLATELET # BLD AUTO: 305 THOUSANDS/UL (ref 149–390)
PLATELET # BLD AUTO: 305 THOUSANDS/UL (ref 149–390)
PLATELET # BLD AUTO: 310 THOUSANDS/UL (ref 149–390)
PLATELET BLD QL SMEAR: ADEQUATE
PLATELET BLD QL SMEAR: ADEQUATE
PMV BLD AUTO: 10.1 FL (ref 8.9–12.7)
PMV BLD AUTO: 10.2 FL (ref 8.9–12.7)
PMV BLD AUTO: 11.8 FL (ref 8.9–12.7)
PMV BLD AUTO: 9.2 FL (ref 8.9–12.7)
PMV BLD AUTO: 9.2 FL (ref 8.9–12.7)
PMV BLD AUTO: 9.3 FL (ref 8.9–12.7)
PMV BLD AUTO: 9.5 FL (ref 8.9–12.7)
PMV BLD AUTO: 9.6 FL (ref 8.9–12.7)
PMV BLD AUTO: 9.7 FL (ref 8.9–12.7)
POTASSIUM SERPL-SCNC: 3.4 MMOL/L (ref 3.5–5.3)
POTASSIUM SERPL-SCNC: 3.7 MMOL/L (ref 3.5–5.3)
POTASSIUM SERPL-SCNC: 3.8 MMOL/L (ref 3.5–5.3)
POTASSIUM SERPL-SCNC: 4.1 MMOL/L (ref 3.5–5.3)
POTASSIUM SERPL-SCNC: 4.2 MMOL/L (ref 3.5–5.3)
POTASSIUM SERPL-SCNC: 4.7 MMOL/L (ref 3.5–5.3)
PROCALCITONIN SERPL-MCNC: 0.21 NG/ML
PROCALCITONIN SERPL-MCNC: 0.21 NG/ML
PROCALCITONIN SERPL-MCNC: 0.27 NG/ML
PROT SERPL-MCNC: 6.1 G/DL (ref 6.4–8.2)
PROT SERPL-MCNC: 6.4 G/DL (ref 6.4–8.2)
PROT SERPL-MCNC: 6.4 G/DL (ref 6.4–8.2)
PROT SERPL-MCNC: 6.7 G/DL (ref 6.4–8.2)
PROT UR STRIP-MCNC: ABNORMAL MG/DL
PROT UR STRIP-MCNC: ABNORMAL MG/DL
PROTHROMBIN TIME: 11 SECONDS (ref 9.4–11.7)
QRS AXIS: 30 DEGREES
QRS AXIS: 60 DEGREES
QRSD INTERVAL: 134 MS
QRSD INTERVAL: 136 MS
QT INTERVAL: 356 MS
QT INTERVAL: 430 MS
QTC INTERVAL: 493 MS
QTC INTERVAL: 500 MS
RBC # BLD AUTO: 3.32 MILLION/UL (ref 3.88–5.62)
RBC # BLD AUTO: 3.32 MILLION/UL (ref 3.88–5.62)
RBC # BLD AUTO: 3.36 MILLION/UL (ref 3.88–5.62)
RBC # BLD AUTO: 3.4 MILLION/UL (ref 3.88–5.62)
RBC # BLD AUTO: 3.49 MILLION/UL (ref 3.88–5.62)
RBC # BLD AUTO: 3.53 MILLION/UL (ref 3.88–5.62)
RBC # BLD AUTO: 3.56 MILLION/UL (ref 3.88–5.62)
RBC # BLD AUTO: 3.65 MILLION/UL (ref 3.88–5.62)
RBC # BLD AUTO: 3.71 MILLION/UL (ref 3.88–5.62)
RBC # BLD AUTO: 3.72 MILLION/UL (ref 3.88–5.62)
RBC # BLD AUTO: 4.02 MILLION/UL (ref 3.88–5.62)
RBC #/AREA URNS AUTO: ABNORMAL /HPF
RBC #/AREA URNS AUTO: ABNORMAL /HPF
RBC MORPH BLD: NORMAL
RBC MORPH BLD: NORMAL
RH BLD: POSITIVE
SODIUM SERPL-SCNC: 136 MMOL/L (ref 136–145)
SODIUM SERPL-SCNC: 136 MMOL/L (ref 136–145)
SODIUM SERPL-SCNC: 138 MMOL/L (ref 136–145)
SODIUM SERPL-SCNC: 139 MMOL/L (ref 136–145)
SODIUM SERPL-SCNC: 139 MMOL/L (ref 136–145)
SODIUM SERPL-SCNC: 142 MMOL/L (ref 136–145)
SODIUM SERPL-SCNC: 142 MMOL/L (ref 136–145)
SODIUM SERPL-SCNC: 143 MMOL/L (ref 136–145)
SODIUM SERPL-SCNC: 144 MMOL/L (ref 136–145)
SODIUM SERPL-SCNC: 144 MMOL/L (ref 136–145)
SP GR UR STRIP.AUTO: 1.01 (ref 1–1.03)
SP GR UR STRIP.AUTO: 1.02 (ref 1–1.03)
SPECIMEN EXPIRATION DATE: NORMAL
T WAVE AXIS: 28 DEGREES
T WAVE AXIS: 39 DEGREES
TOTAL CELLS COUNTED SPEC: 100
TOTAL CELLS COUNTED SPEC: 100
TOXIC GRANULES BLD QL SMEAR: PRESENT
TRI-PHOS CRY URNS QL MICRO: ABNORMAL /HPF
TRIGL SERPL-MCNC: 83 MG/DL
TSH SERPL DL<=0.05 MIU/L-ACNC: 2.01 UIU/ML (ref 0.36–3.74)
UROBILINOGEN UR QL STRIP.AUTO: 0.2 E.U./DL
UROBILINOGEN UR QL STRIP.AUTO: 2 E.U./DL
VENTRICULAR RATE: 119 BPM
VENTRICULAR RATE: 79 BPM
WBC # BLD AUTO: 10.38 THOUSAND/UL (ref 4.31–10.16)
WBC # BLD AUTO: 14.63 THOUSAND/UL (ref 4.31–10.16)
WBC # BLD AUTO: 14.71 THOUSAND/UL (ref 4.31–10.16)
WBC # BLD AUTO: 15.51 THOUSAND/UL (ref 4.31–10.16)
WBC # BLD AUTO: 16.18 THOUSAND/UL (ref 4.31–10.16)
WBC # BLD AUTO: 16.3 THOUSAND/UL (ref 4.31–10.16)
WBC # BLD AUTO: 16.58 THOUSAND/UL (ref 4.31–10.16)
WBC # BLD AUTO: 8.2 THOUSAND/UL (ref 4.31–10.16)
WBC # BLD AUTO: 8.31 THOUSAND/UL (ref 4.31–10.16)
WBC # BLD AUTO: 9.65 THOUSAND/UL (ref 4.31–10.16)
WBC # BLD AUTO: 9.8 THOUSAND/UL (ref 4.31–10.16)
WBC #/AREA URNS AUTO: ABNORMAL /HPF
WBC #/AREA URNS AUTO: ABNORMAL /HPF

## 2019-01-01 PROCEDURE — 94660 CPAP INITIATION&MGMT: CPT

## 2019-01-01 PROCEDURE — 82948 REAGENT STRIP/BLOOD GLUCOSE: CPT

## 2019-01-01 PROCEDURE — NC001 PR NO CHARGE: Performed by: ORTHOPAEDIC SURGERY

## 2019-01-01 PROCEDURE — 82550 ASSAY OF CK (CPK): CPT | Performed by: NURSE PRACTITIONER

## 2019-01-01 PROCEDURE — 83605 ASSAY OF LACTIC ACID: CPT | Performed by: ANESTHESIOLOGY

## 2019-01-01 PROCEDURE — 94640 AIRWAY INHALATION TREATMENT: CPT

## 2019-01-01 PROCEDURE — NC001 PR NO CHARGE: Performed by: INTERNAL MEDICINE

## 2019-01-01 PROCEDURE — 83735 ASSAY OF MAGNESIUM: CPT | Performed by: NURSE PRACTITIONER

## 2019-01-01 PROCEDURE — 70498 CT ANGIOGRAPHY NECK: CPT

## 2019-01-01 PROCEDURE — 94664 DEMO&/EVAL PT USE INHALER: CPT

## 2019-01-01 PROCEDURE — G8978 MOBILITY CURRENT STATUS: HCPCS

## 2019-01-01 PROCEDURE — 99223 1ST HOSP IP/OBS HIGH 75: CPT | Performed by: STUDENT IN AN ORGANIZED HEALTH CARE EDUCATION/TRAINING PROGRAM

## 2019-01-01 PROCEDURE — 85007 BL SMEAR W/DIFF WBC COUNT: CPT | Performed by: STUDENT IN AN ORGANIZED HEALTH CARE EDUCATION/TRAINING PROGRAM

## 2019-01-01 PROCEDURE — 82553 CREATINE MB FRACTION: CPT | Performed by: STUDENT IN AN ORGANIZED HEALTH CARE EDUCATION/TRAINING PROGRAM

## 2019-01-01 PROCEDURE — 81001 URINALYSIS AUTO W/SCOPE: CPT | Performed by: NURSE PRACTITIONER

## 2019-01-01 PROCEDURE — 92526 ORAL FUNCTION THERAPY: CPT

## 2019-01-01 PROCEDURE — 80048 BASIC METABOLIC PNL TOTAL CA: CPT | Performed by: INTERNAL MEDICINE

## 2019-01-01 PROCEDURE — 83036 HEMOGLOBIN GLYCOSYLATED A1C: CPT | Performed by: NURSE PRACTITIONER

## 2019-01-01 PROCEDURE — 99221 1ST HOSP IP/OBS SF/LOW 40: CPT | Performed by: PHYSICIAN ASSISTANT

## 2019-01-01 PROCEDURE — 97110 THERAPEUTIC EXERCISES: CPT

## 2019-01-01 PROCEDURE — 99232 SBSQ HOSP IP/OBS MODERATE 35: CPT | Performed by: INTERNAL MEDICINE

## 2019-01-01 PROCEDURE — 80048 BASIC METABOLIC PNL TOTAL CA: CPT | Performed by: STUDENT IN AN ORGANIZED HEALTH CARE EDUCATION/TRAINING PROGRAM

## 2019-01-01 PROCEDURE — 99221 1ST HOSP IP/OBS SF/LOW 40: CPT | Performed by: INTERNAL MEDICINE

## 2019-01-01 PROCEDURE — 85027 COMPLETE CBC AUTOMATED: CPT | Performed by: STUDENT IN AN ORGANIZED HEALTH CARE EDUCATION/TRAINING PROGRAM

## 2019-01-01 PROCEDURE — 71045 X-RAY EXAM CHEST 1 VIEW: CPT

## 2019-01-01 PROCEDURE — G8997 SWALLOW GOAL STATUS: HCPCS

## 2019-01-01 PROCEDURE — 83735 ASSAY OF MAGNESIUM: CPT | Performed by: INTERNAL MEDICINE

## 2019-01-01 PROCEDURE — 94760 N-INVAS EAR/PLS OXIMETRY 1: CPT

## 2019-01-01 PROCEDURE — G8996 SWALLOW CURRENT STATUS: HCPCS

## 2019-01-01 PROCEDURE — 85730 THROMBOPLASTIN TIME PARTIAL: CPT | Performed by: INTERNAL MEDICINE

## 2019-01-01 PROCEDURE — 86850 RBC ANTIBODY SCREEN: CPT | Performed by: EMERGENCY MEDICINE

## 2019-01-01 PROCEDURE — 84100 ASSAY OF PHOSPHORUS: CPT | Performed by: EMERGENCY MEDICINE

## 2019-01-01 PROCEDURE — 85730 THROMBOPLASTIN TIME PARTIAL: CPT | Performed by: STUDENT IN AN ORGANIZED HEALTH CARE EDUCATION/TRAINING PROGRAM

## 2019-01-01 PROCEDURE — 74018 RADEX ABDOMEN 1 VIEW: CPT

## 2019-01-01 PROCEDURE — 96374 THER/PROPH/DIAG INJ IV PUSH: CPT

## 2019-01-01 PROCEDURE — 85025 COMPLETE CBC W/AUTO DIFF WBC: CPT | Performed by: INTERNAL MEDICINE

## 2019-01-01 PROCEDURE — 93306 TTE W/DOPPLER COMPLETE: CPT | Performed by: INTERNAL MEDICINE

## 2019-01-01 PROCEDURE — 82550 ASSAY OF CK (CPK): CPT | Performed by: STUDENT IN AN ORGANIZED HEALTH CARE EDUCATION/TRAINING PROGRAM

## 2019-01-01 PROCEDURE — 82553 CREATINE MB FRACTION: CPT | Performed by: INTERNAL MEDICINE

## 2019-01-01 PROCEDURE — 99233 SBSQ HOSP IP/OBS HIGH 50: CPT | Performed by: STUDENT IN AN ORGANIZED HEALTH CARE EDUCATION/TRAINING PROGRAM

## 2019-01-01 PROCEDURE — 83690 ASSAY OF LIPASE: CPT | Performed by: EMERGENCY MEDICINE

## 2019-01-01 PROCEDURE — 93005 ELECTROCARDIOGRAM TRACING: CPT

## 2019-01-01 PROCEDURE — 99233 SBSQ HOSP IP/OBS HIGH 50: CPT | Performed by: INTERNAL MEDICINE

## 2019-01-01 PROCEDURE — 92610 EVALUATE SWALLOWING FUNCTION: CPT

## 2019-01-01 PROCEDURE — 84145 PROCALCITONIN (PCT): CPT | Performed by: GENERAL PRACTICE

## 2019-01-01 PROCEDURE — 99222 1ST HOSP IP/OBS MODERATE 55: CPT | Performed by: INTERNAL MEDICINE

## 2019-01-01 PROCEDURE — 80061 LIPID PANEL: CPT | Performed by: NURSE PRACTITIONER

## 2019-01-01 PROCEDURE — 73522 X-RAY EXAM HIPS BI 3-4 VIEWS: CPT

## 2019-01-01 PROCEDURE — 36415 COLL VENOUS BLD VENIPUNCTURE: CPT | Performed by: EMERGENCY MEDICINE

## 2019-01-01 PROCEDURE — G8988 SELF CARE GOAL STATUS: HCPCS

## 2019-01-01 PROCEDURE — 97163 PT EVAL HIGH COMPLEX 45 MIN: CPT

## 2019-01-01 PROCEDURE — 82553 CREATINE MB FRACTION: CPT | Performed by: NURSE PRACTITIONER

## 2019-01-01 PROCEDURE — 82550 ASSAY OF CK (CPK): CPT | Performed by: EMERGENCY MEDICINE

## 2019-01-01 PROCEDURE — 85025 COMPLETE CBC W/AUTO DIFF WBC: CPT | Performed by: NURSE PRACTITIONER

## 2019-01-01 PROCEDURE — 94669 MECHANICAL CHEST WALL OSCILL: CPT

## 2019-01-01 PROCEDURE — 86900 BLOOD TYPING SEROLOGIC ABO: CPT | Performed by: EMERGENCY MEDICINE

## 2019-01-01 PROCEDURE — 82550 ASSAY OF CK (CPK): CPT | Performed by: INTERNAL MEDICINE

## 2019-01-01 PROCEDURE — 99221 1ST HOSP IP/OBS SF/LOW 40: CPT | Performed by: NURSE PRACTITIONER

## 2019-01-01 PROCEDURE — 97167 OT EVAL HIGH COMPLEX 60 MIN: CPT

## 2019-01-01 PROCEDURE — 97535 SELF CARE MNGMENT TRAINING: CPT

## 2019-01-01 PROCEDURE — 83605 ASSAY OF LACTIC ACID: CPT | Performed by: EMERGENCY MEDICINE

## 2019-01-01 PROCEDURE — A9585 GADOBUTROL INJECTION: HCPCS | Performed by: PHYSICIAN ASSISTANT

## 2019-01-01 PROCEDURE — 84443 ASSAY THYROID STIM HORMONE: CPT | Performed by: EMERGENCY MEDICINE

## 2019-01-01 PROCEDURE — 99239 HOSP IP/OBS DSCHRG MGMT >30: CPT | Performed by: STUDENT IN AN ORGANIZED HEALTH CARE EDUCATION/TRAINING PROGRAM

## 2019-01-01 PROCEDURE — 99223 1ST HOSP IP/OBS HIGH 75: CPT | Performed by: INTERNAL MEDICINE

## 2019-01-01 PROCEDURE — 99233 SBSQ HOSP IP/OBS HIGH 50: CPT | Performed by: PSYCHIATRY & NEUROLOGY

## 2019-01-01 PROCEDURE — 85730 THROMBOPLASTIN TIME PARTIAL: CPT | Performed by: EMERGENCY MEDICINE

## 2019-01-01 PROCEDURE — 93010 ELECTROCARDIOGRAM REPORT: CPT | Performed by: INTERNAL MEDICINE

## 2019-01-01 PROCEDURE — 85610 PROTHROMBIN TIME: CPT | Performed by: EMERGENCY MEDICINE

## 2019-01-01 PROCEDURE — 80048 BASIC METABOLIC PNL TOTAL CA: CPT | Performed by: NURSE PRACTITIONER

## 2019-01-01 PROCEDURE — 84145 PROCALCITONIN (PCT): CPT | Performed by: INTERNAL MEDICINE

## 2019-01-01 PROCEDURE — 84145 PROCALCITONIN (PCT): CPT | Performed by: NURSE PRACTITIONER

## 2019-01-01 PROCEDURE — 93880 EXTRACRANIAL BILAT STUDY: CPT | Performed by: SURGERY

## 2019-01-01 PROCEDURE — 99232 SBSQ HOSP IP/OBS MODERATE 35: CPT | Performed by: STUDENT IN AN ORGANIZED HEALTH CARE EDUCATION/TRAINING PROGRAM

## 2019-01-01 PROCEDURE — 99233 SBSQ HOSP IP/OBS HIGH 50: CPT | Performed by: PHYSICIAN ASSISTANT

## 2019-01-01 PROCEDURE — 85007 BL SMEAR W/DIFF WBC COUNT: CPT | Performed by: INTERNAL MEDICINE

## 2019-01-01 PROCEDURE — 85025 COMPLETE CBC W/AUTO DIFF WBC: CPT | Performed by: EMERGENCY MEDICINE

## 2019-01-01 PROCEDURE — 83735 ASSAY OF MAGNESIUM: CPT | Performed by: EMERGENCY MEDICINE

## 2019-01-01 PROCEDURE — 87040 BLOOD CULTURE FOR BACTERIA: CPT | Performed by: EMERGENCY MEDICINE

## 2019-01-01 PROCEDURE — 83605 ASSAY OF LACTIC ACID: CPT | Performed by: STUDENT IN AN ORGANIZED HEALTH CARE EDUCATION/TRAINING PROGRAM

## 2019-01-01 PROCEDURE — 83735 ASSAY OF MAGNESIUM: CPT | Performed by: STUDENT IN AN ORGANIZED HEALTH CARE EDUCATION/TRAINING PROGRAM

## 2019-01-01 PROCEDURE — 99233 SBSQ HOSP IP/OBS HIGH 50: CPT | Performed by: NURSE PRACTITIONER

## 2019-01-01 PROCEDURE — 80053 COMPREHEN METABOLIC PANEL: CPT | Performed by: NURSE PRACTITIONER

## 2019-01-01 PROCEDURE — 72197 MRI PELVIS W/O & W/DYE: CPT

## 2019-01-01 PROCEDURE — 80053 COMPREHEN METABOLIC PANEL: CPT | Performed by: STUDENT IN AN ORGANIZED HEALTH CARE EDUCATION/TRAINING PROGRAM

## 2019-01-01 PROCEDURE — 86901 BLOOD TYPING SEROLOGIC RH(D): CPT | Performed by: EMERGENCY MEDICINE

## 2019-01-01 PROCEDURE — 80053 COMPREHEN METABOLIC PANEL: CPT | Performed by: EMERGENCY MEDICINE

## 2019-01-01 PROCEDURE — 84100 ASSAY OF PHOSPHORUS: CPT | Performed by: NURSE PRACTITIONER

## 2019-01-01 PROCEDURE — 99232 SBSQ HOSP IP/OBS MODERATE 35: CPT | Performed by: PSYCHIATRY & NEUROLOGY

## 2019-01-01 PROCEDURE — 80076 HEPATIC FUNCTION PANEL: CPT | Performed by: INTERNAL MEDICINE

## 2019-01-01 PROCEDURE — 93880 EXTRACRANIAL BILAT STUDY: CPT

## 2019-01-01 PROCEDURE — G8987 SELF CARE CURRENT STATUS: HCPCS

## 2019-01-01 PROCEDURE — 99223 1ST HOSP IP/OBS HIGH 75: CPT | Performed by: PSYCHIATRY & NEUROLOGY

## 2019-01-01 PROCEDURE — 74230 X-RAY XM SWLNG FUNCJ C+: CPT

## 2019-01-01 PROCEDURE — 85027 COMPLETE CBC AUTOMATED: CPT | Performed by: INTERNAL MEDICINE

## 2019-01-01 PROCEDURE — 97530 THERAPEUTIC ACTIVITIES: CPT

## 2019-01-01 PROCEDURE — 94668 MNPJ CHEST WALL SBSQ: CPT

## 2019-01-01 PROCEDURE — 82553 CREATINE MB FRACTION: CPT | Performed by: EMERGENCY MEDICINE

## 2019-01-01 PROCEDURE — 83880 ASSAY OF NATRIURETIC PEPTIDE: CPT | Performed by: EMERGENCY MEDICINE

## 2019-01-01 PROCEDURE — 70551 MRI BRAIN STEM W/O DYE: CPT

## 2019-01-01 PROCEDURE — 70450 CT HEAD/BRAIN W/O DYE: CPT

## 2019-01-01 PROCEDURE — 99291 CRITICAL CARE FIRST HOUR: CPT

## 2019-01-01 PROCEDURE — 87081 CULTURE SCREEN ONLY: CPT | Performed by: ANESTHESIOLOGY

## 2019-01-01 PROCEDURE — 85025 COMPLETE CBC W/AUTO DIFF WBC: CPT | Performed by: STUDENT IN AN ORGANIZED HEALTH CARE EDUCATION/TRAINING PROGRAM

## 2019-01-01 PROCEDURE — 96361 HYDRATE IV INFUSION ADD-ON: CPT

## 2019-01-01 PROCEDURE — 99223 1ST HOSP IP/OBS HIGH 75: CPT | Performed by: ORTHOPAEDIC SURGERY

## 2019-01-01 PROCEDURE — 92611 MOTION FLUOROSCOPY/SWALLOW: CPT

## 2019-01-01 PROCEDURE — 70496 CT ANGIOGRAPHY HEAD: CPT

## 2019-01-01 PROCEDURE — 99232 SBSQ HOSP IP/OBS MODERATE 35: CPT | Performed by: ANESTHESIOLOGY

## 2019-01-01 PROCEDURE — 81001 URINALYSIS AUTO W/SCOPE: CPT | Performed by: STUDENT IN AN ORGANIZED HEALTH CARE EDUCATION/TRAINING PROGRAM

## 2019-01-01 PROCEDURE — 0T9B70Z DRAINAGE OF BLADDER WITH DRAINAGE DEVICE, VIA NATURAL OR ARTIFICIAL OPENING: ICD-10-PCS | Performed by: STUDENT IN AN ORGANIZED HEALTH CARE EDUCATION/TRAINING PROGRAM

## 2019-01-01 PROCEDURE — G8979 MOBILITY GOAL STATUS: HCPCS

## 2019-01-01 PROCEDURE — 93306 TTE W/DOPPLER COMPLETE: CPT

## 2019-01-01 PROCEDURE — 71250 CT THORAX DX C-: CPT

## 2019-01-01 RX ORDER — SCOLOPAMINE TRANSDERMAL SYSTEM 1 MG/1
1 PATCH, EXTENDED RELEASE TRANSDERMAL
Status: CANCELLED | OUTPATIENT
Start: 2019-01-01

## 2019-01-01 RX ORDER — SODIUM CHLORIDE 9 MG/ML
100 INJECTION, SOLUTION INTRAVENOUS CONTINUOUS
Status: DISCONTINUED | OUTPATIENT
Start: 2019-01-01 | End: 2019-01-01

## 2019-01-01 RX ORDER — SODIUM CHLORIDE FOR INHALATION 0.9 %
3 VIAL, NEBULIZER (ML) INHALATION
Status: DISCONTINUED | OUTPATIENT
Start: 2019-01-01 | End: 2019-01-01 | Stop reason: HOSPADM

## 2019-01-01 RX ORDER — ATORVASTATIN CALCIUM 40 MG/1
40 TABLET, FILM COATED ORAL EVERY EVENING
Status: DISCONTINUED | OUTPATIENT
Start: 2019-01-01 | End: 2019-01-01

## 2019-01-01 RX ORDER — HEPARIN SODIUM 10000 [USP'U]/100ML
300-2000 INJECTION, SOLUTION INTRAVENOUS
Status: DISCONTINUED | OUTPATIENT
Start: 2019-01-01 | End: 2019-01-01

## 2019-01-01 RX ORDER — MIRTAZAPINE 30 MG/1
30 TABLET, FILM COATED ORAL
Qty: 30 TABLET | Refills: 6 | Status: SHIPPED | OUTPATIENT
Start: 2019-01-01 | End: 2019-01-01 | Stop reason: HOSPADM

## 2019-01-01 RX ORDER — DEXTROSE, SODIUM CHLORIDE, SODIUM LACTATE, POTASSIUM CHLORIDE, AND CALCIUM CHLORIDE 5; .6; .31; .03; .02 G/100ML; G/100ML; G/100ML; G/100ML; G/100ML
75 INJECTION, SOLUTION INTRAVENOUS CONTINUOUS
Status: DISCONTINUED | OUTPATIENT
Start: 2019-01-01 | End: 2019-01-01

## 2019-01-01 RX ORDER — LEVALBUTEROL 1.25 MG/.5ML
1.25 SOLUTION, CONCENTRATE RESPIRATORY (INHALATION)
Status: DISCONTINUED | OUTPATIENT
Start: 2019-01-01 | End: 2019-01-01 | Stop reason: HOSPADM

## 2019-01-01 RX ORDER — ACETAMINOPHEN 650 MG/1
325 SUPPOSITORY RECTAL EVERY 4 HOURS PRN
Status: DISCONTINUED | OUTPATIENT
Start: 2019-01-01 | End: 2019-01-01 | Stop reason: HOSPADM

## 2019-01-01 RX ORDER — ASPIRIN 325 MG
325 TABLET ORAL DAILY
Status: DISCONTINUED | OUTPATIENT
Start: 2019-01-01 | End: 2019-01-01

## 2019-01-01 RX ORDER — DIGOXIN 0.25 MG/ML
125 INJECTION INTRAMUSCULAR; INTRAVENOUS DAILY
Status: DISCONTINUED | OUTPATIENT
Start: 2019-01-01 | End: 2019-01-01

## 2019-01-01 RX ORDER — DIGOXIN 0.25 MG/ML
62.5 INJECTION INTRAMUSCULAR; INTRAVENOUS DAILY
Status: DISCONTINUED | OUTPATIENT
Start: 2019-01-01 | End: 2019-01-01

## 2019-01-01 RX ORDER — DILTIAZEM HYDROCHLORIDE 5 MG/ML
10 INJECTION INTRAVENOUS ONCE
Status: COMPLETED | OUTPATIENT
Start: 2019-01-01 | End: 2019-01-01

## 2019-01-01 RX ORDER — ALBUMIN, HUMAN INJ 5% 5 %
25 SOLUTION INTRAVENOUS ONCE
Status: COMPLETED | OUTPATIENT
Start: 2019-01-01 | End: 2019-01-01

## 2019-01-01 RX ORDER — FUROSEMIDE 10 MG/ML
20 INJECTION INTRAMUSCULAR; INTRAVENOUS ONCE
Status: COMPLETED | OUTPATIENT
Start: 2019-01-01 | End: 2019-01-01

## 2019-01-01 RX ORDER — HEPARIN SODIUM 5000 [USP'U]/ML
5000 INJECTION, SOLUTION INTRAVENOUS; SUBCUTANEOUS EVERY 8 HOURS SCHEDULED
Status: DISCONTINUED | OUTPATIENT
Start: 2019-01-01 | End: 2019-01-01 | Stop reason: SDUPTHER

## 2019-01-01 RX ORDER — ACETAMINOPHEN 650 MG/1
325 SUPPOSITORY RECTAL EVERY 4 HOURS PRN
Status: DISCONTINUED | OUTPATIENT
Start: 2019-01-01 | End: 2019-01-01

## 2019-01-01 RX ORDER — LABETALOL 20 MG/4 ML (5 MG/ML) INTRAVENOUS SYRINGE
10 ONCE
Status: COMPLETED | OUTPATIENT
Start: 2019-01-01 | End: 2019-01-01

## 2019-01-01 RX ORDER — ACETAMINOPHEN 650 MG/1
325 SUPPOSITORY RECTAL EVERY 4 HOURS PRN
Status: CANCELLED | OUTPATIENT
Start: 2019-01-01

## 2019-01-01 RX ORDER — SCOLOPAMINE TRANSDERMAL SYSTEM 1 MG/1
1 PATCH, EXTENDED RELEASE TRANSDERMAL
Status: DISCONTINUED | OUTPATIENT
Start: 2019-01-01 | End: 2019-01-01 | Stop reason: HOSPADM

## 2019-01-01 RX ORDER — LABETALOL 20 MG/4 ML (5 MG/ML) INTRAVENOUS SYRINGE
10 ONCE
Status: DISCONTINUED | OUTPATIENT
Start: 2019-01-01 | End: 2019-01-01

## 2019-01-01 RX ORDER — DIGOXIN 0.25 MG/ML
250 INJECTION INTRAMUSCULAR; INTRAVENOUS ONCE
Status: COMPLETED | OUTPATIENT
Start: 2019-01-01 | End: 2019-01-01

## 2019-01-01 RX ORDER — POLYVINYL ALCOHOL 14 MG/ML
2 SOLUTION/ DROPS OPHTHALMIC EVERY 2 HOUR PRN
Status: DISCONTINUED | OUTPATIENT
Start: 2019-01-01 | End: 2019-01-01 | Stop reason: HOSPADM

## 2019-01-01 RX ORDER — ASPIRIN 300 MG/1
300 SUPPOSITORY RECTAL DAILY
Status: DISCONTINUED | OUTPATIENT
Start: 2019-01-01 | End: 2019-01-01

## 2019-01-01 RX ORDER — LEVALBUTEROL 1.25 MG/.5ML
1.25 SOLUTION, CONCENTRATE RESPIRATORY (INHALATION)
Status: CANCELLED | OUTPATIENT
Start: 2019-01-01

## 2019-01-01 RX ORDER — DIGOXIN 0.25 MG/ML
62.5 INJECTION INTRAMUSCULAR; INTRAVENOUS ONCE
Status: DISCONTINUED | OUTPATIENT
Start: 2019-01-01 | End: 2019-01-01

## 2019-01-01 RX ORDER — ASPIRIN 300 MG/1
300 SUPPOSITORY RECTAL ONCE
Status: DISCONTINUED | OUTPATIENT
Start: 2019-01-01 | End: 2019-01-01

## 2019-01-01 RX ORDER — LEVALBUTEROL 1.25 MG/.5ML
1.25 SOLUTION, CONCENTRATE RESPIRATORY (INHALATION)
Status: DISCONTINUED | OUTPATIENT
Start: 2019-01-01 | End: 2019-01-01

## 2019-01-01 RX ORDER — SODIUM CHLORIDE FOR INHALATION 0.9 %
3 VIAL, NEBULIZER (ML) INHALATION
Status: CANCELLED | OUTPATIENT
Start: 2019-01-01

## 2019-01-01 RX ORDER — DIGOXIN 0.25 MG/ML
125 INJECTION INTRAMUSCULAR; INTRAVENOUS EVERY OTHER DAY
Status: DISCONTINUED | OUTPATIENT
Start: 2019-01-01 | End: 2019-01-01

## 2019-01-01 RX ORDER — DIGOXIN 125 MCG
125 TABLET ORAL
Status: DISCONTINUED | OUTPATIENT
Start: 2019-01-01 | End: 2019-01-01

## 2019-01-01 RX ORDER — CLOPIDOGREL BISULFATE 75 MG/1
300 TABLET ORAL ONCE
Status: DISCONTINUED | OUTPATIENT
Start: 2019-01-01 | End: 2019-01-01

## 2019-01-01 RX ORDER — LABETALOL 20 MG/4 ML (5 MG/ML) INTRAVENOUS SYRINGE
Status: COMPLETED
Start: 2019-01-01 | End: 2019-01-01

## 2019-01-01 RX ORDER — SODIUM CHLORIDE FOR INHALATION 0.9 %
3 VIAL, NEBULIZER (ML) INHALATION
Status: DISCONTINUED | OUTPATIENT
Start: 2019-01-01 | End: 2019-01-01

## 2019-01-01 RX ORDER — METOPROLOL TARTRATE 5 MG/5ML
INJECTION INTRAVENOUS
Status: COMPLETED
Start: 2019-01-01 | End: 2019-01-01

## 2019-01-01 RX ORDER — LORAZEPAM 2 MG/ML
0.5 INJECTION INTRAMUSCULAR EVERY 4 HOURS PRN
Status: DISCONTINUED | OUTPATIENT
Start: 2019-01-01 | End: 2019-01-01 | Stop reason: HOSPADM

## 2019-01-01 RX ORDER — METOPROLOL TARTRATE 5 MG/5ML
2.5 INJECTION INTRAVENOUS EVERY 6 HOURS
Status: DISCONTINUED | OUTPATIENT
Start: 2019-01-01 | End: 2019-01-01

## 2019-01-01 RX ORDER — POTASSIUM CHLORIDE 14.9 MG/ML
20 INJECTION INTRAVENOUS ONCE
Status: COMPLETED | OUTPATIENT
Start: 2019-01-01 | End: 2019-01-01

## 2019-01-01 RX ORDER — LORAZEPAM 2 MG/ML
0.5 INJECTION INTRAMUSCULAR EVERY 4 HOURS PRN
Status: CANCELLED | OUTPATIENT
Start: 2019-01-01

## 2019-01-01 RX ORDER — HEPARIN SODIUM 5000 [USP'U]/ML
5000 INJECTION, SOLUTION INTRAVENOUS; SUBCUTANEOUS EVERY 8 HOURS SCHEDULED
Status: DISCONTINUED | OUTPATIENT
Start: 2019-01-01 | End: 2019-01-01

## 2019-01-01 RX ORDER — POLYVINYL ALCOHOL 14 MG/ML
2 SOLUTION/ DROPS OPHTHALMIC EVERY 2 HOUR PRN
Status: CANCELLED | OUTPATIENT
Start: 2019-01-01

## 2019-01-01 RX ORDER — DEXTROSE, SODIUM CHLORIDE, AND POTASSIUM CHLORIDE 5; .45; .15 G/100ML; G/100ML; G/100ML
75 INJECTION INTRAVENOUS CONTINUOUS
Status: DISCONTINUED | OUTPATIENT
Start: 2019-01-01 | End: 2019-01-01

## 2019-01-01 RX ORDER — BISACODYL 10 MG
10 SUPPOSITORY, RECTAL RECTAL DAILY PRN
Status: DISCONTINUED | OUTPATIENT
Start: 2019-01-01 | End: 2019-01-01 | Stop reason: HOSPADM

## 2019-01-01 RX ORDER — DEXTROSE, SODIUM CHLORIDE, AND POTASSIUM CHLORIDE 5; .9; .15 G/100ML; G/100ML; G/100ML
75 INJECTION INTRAVENOUS CONTINUOUS
Status: DISCONTINUED | OUTPATIENT
Start: 2019-01-01 | End: 2019-01-01

## 2019-01-01 RX ORDER — SODIUM CHLORIDE 9 MG/ML
75 INJECTION, SOLUTION INTRAVENOUS CONTINUOUS
Status: DISCONTINUED | OUTPATIENT
Start: 2019-01-01 | End: 2019-01-01

## 2019-01-01 RX ORDER — METOPROLOL TARTRATE 5 MG/5ML
5 INJECTION INTRAVENOUS EVERY 6 HOURS
Status: DISCONTINUED | OUTPATIENT
Start: 2019-01-01 | End: 2019-01-01

## 2019-01-01 RX ORDER — BISACODYL 10 MG
10 SUPPOSITORY, RECTAL RECTAL DAILY PRN
Status: CANCELLED | OUTPATIENT
Start: 2019-01-01

## 2019-01-01 RX ORDER — DIGOXIN 0.25 MG/ML
62.5 INJECTION INTRAMUSCULAR; INTRAVENOUS ONCE
Status: COMPLETED | OUTPATIENT
Start: 2019-01-01 | End: 2019-01-01

## 2019-01-01 RX ORDER — HYDROMORPHONE HCL/PF 1 MG/ML
0.2 SYRINGE (ML) INJECTION EVERY 4 HOURS PRN
Status: DISCONTINUED | OUTPATIENT
Start: 2019-01-01 | End: 2019-01-01

## 2019-01-01 RX ORDER — SCOLOPAMINE TRANSDERMAL SYSTEM 1 MG/1
1 PATCH, EXTENDED RELEASE TRANSDERMAL
Status: DISCONTINUED | OUTPATIENT
Start: 2019-01-01 | End: 2019-01-01

## 2019-01-01 RX ORDER — LABETALOL 20 MG/4 ML (5 MG/ML) INTRAVENOUS SYRINGE
10 EVERY 6 HOURS PRN
Status: DISCONTINUED | OUTPATIENT
Start: 2019-01-01 | End: 2019-01-01

## 2019-01-01 RX ORDER — HEPARIN SODIUM 10000 [USP'U]/100ML
300-2000 INJECTION, SOLUTION INTRAVENOUS
Status: CANCELLED | OUTPATIENT
Start: 2019-01-01

## 2019-01-01 RX ORDER — IPRATROPIUM BROMIDE AND ALBUTEROL SULFATE 2.5; .5 MG/3ML; MG/3ML
3 SOLUTION RESPIRATORY (INHALATION) EVERY 6 HOURS PRN
Status: DISCONTINUED | OUTPATIENT
Start: 2019-01-01 | End: 2019-01-01

## 2019-01-01 RX ORDER — HYOSCYAMINE SULFATE 0.12 MG/ML
0.12 LIQUID ORAL EVERY 4 HOURS PRN
Status: DISCONTINUED | OUTPATIENT
Start: 2019-01-01 | End: 2019-01-01 | Stop reason: CLARIF

## 2019-01-01 RX ORDER — DIGOXIN 0.05 MG/ML
125 SOLUTION ORAL
Status: DISCONTINUED | OUTPATIENT
Start: 2019-01-01 | End: 2019-01-01 | Stop reason: SDUPTHER

## 2019-01-01 RX ORDER — QUETIAPINE FUMARATE 100 MG/1
100 TABLET, FILM COATED ORAL
Qty: 30 TABLET | Refills: 6 | Status: SHIPPED | OUTPATIENT
Start: 2019-01-01 | End: 2019-01-01 | Stop reason: HOSPADM

## 2019-01-01 RX ADMIN — ASPIRIN 300 MG: 300 SUPPOSITORY RECTAL at 09:54

## 2019-01-01 RX ADMIN — IOHEXOL 85 ML: 350 INJECTION, SOLUTION INTRAVENOUS at 19:03

## 2019-01-01 RX ADMIN — LORAZEPAM 0.5 MG: 2 INJECTION INTRAMUSCULAR; INTRAVENOUS at 11:46

## 2019-01-01 RX ADMIN — ISODIUM CHLORIDE 3 ML: 0.03 SOLUTION RESPIRATORY (INHALATION) at 08:57

## 2019-01-01 RX ADMIN — ISODIUM CHLORIDE 3 ML: 0.03 SOLUTION RESPIRATORY (INHALATION) at 20:01

## 2019-01-01 RX ADMIN — ISODIUM CHLORIDE 3 ML: 0.03 SOLUTION RESPIRATORY (INHALATION) at 01:50

## 2019-01-01 RX ADMIN — IPRATROPIUM BROMIDE 0.5 MG: 0.5 SOLUTION RESPIRATORY (INHALATION) at 01:15

## 2019-01-01 RX ADMIN — HEPARIN SODIUM 5000 UNITS: 5000 INJECTION INTRAVENOUS; SUBCUTANEOUS at 23:33

## 2019-01-01 RX ADMIN — IPRATROPIUM BROMIDE 0.5 MG: 0.5 SOLUTION RESPIRATORY (INHALATION) at 02:44

## 2019-01-01 RX ADMIN — ISODIUM CHLORIDE 3 ML: 0.03 SOLUTION RESPIRATORY (INHALATION) at 14:30

## 2019-01-01 RX ADMIN — ISODIUM CHLORIDE 3 ML: 0.03 SOLUTION RESPIRATORY (INHALATION) at 01:02

## 2019-01-01 RX ADMIN — LEVALBUTEROL 1.25 MG: 1.25 SOLUTION, CONCENTRATE RESPIRATORY (INHALATION) at 01:15

## 2019-01-01 RX ADMIN — IPRATROPIUM BROMIDE 0.5 MG: 0.5 SOLUTION RESPIRATORY (INHALATION) at 04:24

## 2019-01-01 RX ADMIN — HEPARIN SODIUM 5000 UNITS: 5000 INJECTION INTRAVENOUS; SUBCUTANEOUS at 06:07

## 2019-01-01 RX ADMIN — ISODIUM CHLORIDE 3 ML: 0.03 SOLUTION RESPIRATORY (INHALATION) at 01:15

## 2019-01-01 RX ADMIN — IPRATROPIUM BROMIDE 0.5 MG: 0.5 SOLUTION RESPIRATORY (INHALATION) at 19:40

## 2019-01-01 RX ADMIN — HEPARIN SODIUM 5000 UNITS: 5000 INJECTION INTRAVENOUS; SUBCUTANEOUS at 05:08

## 2019-01-01 RX ADMIN — MORPHINE SULFATE 2 MG: 2 INJECTION, SOLUTION INTRAMUSCULAR; INTRAVENOUS at 03:07

## 2019-01-01 RX ADMIN — MORPHINE SULFATE 2 MG: 2 INJECTION, SOLUTION INTRAMUSCULAR; INTRAVENOUS at 06:41

## 2019-01-01 RX ADMIN — HYDROMORPHONE HYDROCHLORIDE 0.2 MG: 1 INJECTION, SOLUTION INTRAMUSCULAR; INTRAVENOUS; SUBCUTANEOUS at 07:54

## 2019-01-01 RX ADMIN — LEVALBUTEROL 1.25 MG: 1.25 SOLUTION, CONCENTRATE RESPIRATORY (INHALATION) at 01:13

## 2019-01-01 RX ADMIN — METOPROLOL TARTRATE 2.5 MG: 5 INJECTION, SOLUTION INTRAVENOUS at 03:31

## 2019-01-01 RX ADMIN — LEVALBUTEROL 1.25 MG: 1.25 SOLUTION, CONCENTRATE RESPIRATORY (INHALATION) at 01:02

## 2019-01-01 RX ADMIN — IPRATROPIUM BROMIDE 0.5 MG: 0.5 SOLUTION RESPIRATORY (INHALATION) at 07:28

## 2019-01-01 RX ADMIN — LEVALBUTEROL 1.25 MG: 1.25 SOLUTION, CONCENTRATE RESPIRATORY (INHALATION) at 07:14

## 2019-01-01 RX ADMIN — LABETALOL 20 MG/4 ML (5 MG/ML) INTRAVENOUS SYRINGE 10 MG: at 19:04

## 2019-01-01 RX ADMIN — LEVALBUTEROL 1.25 MG: 1.25 SOLUTION, CONCENTRATE RESPIRATORY (INHALATION) at 08:32

## 2019-01-01 RX ADMIN — ISODIUM CHLORIDE 3 ML: 0.03 SOLUTION RESPIRATORY (INHALATION) at 01:44

## 2019-01-01 RX ADMIN — GADOBUTROL 8 ML: 604.72 INJECTION INTRAVENOUS at 18:33

## 2019-01-01 RX ADMIN — HYDROMORPHONE HYDROCHLORIDE 0.2 MG: 1 INJECTION, SOLUTION INTRAMUSCULAR; INTRAVENOUS; SUBCUTANEOUS at 18:03

## 2019-01-01 RX ADMIN — IPRATROPIUM BROMIDE 0.5 MG: 0.5 SOLUTION RESPIRATORY (INHALATION) at 01:02

## 2019-01-01 RX ADMIN — POTASSIUM CHLORIDE, DEXTROSE MONOHYDRATE AND SODIUM CHLORIDE 75 ML/HR: 150; 5; 900 INJECTION, SOLUTION INTRAVENOUS at 06:48

## 2019-01-01 RX ADMIN — FUROSEMIDE 20 MG: 10 INJECTION, SOLUTION INTRAMUSCULAR; INTRAVENOUS at 09:26

## 2019-01-01 RX ADMIN — ISODIUM CHLORIDE 3 ML: 0.03 SOLUTION RESPIRATORY (INHALATION) at 20:09

## 2019-01-01 RX ADMIN — HYDROMORPHONE HYDROCHLORIDE 0.2 MG: 1 INJECTION, SOLUTION INTRAMUSCULAR; INTRAVENOUS; SUBCUTANEOUS at 10:54

## 2019-01-01 RX ADMIN — IPRATROPIUM BROMIDE 0.5 MG: 0.5 SOLUTION RESPIRATORY (INHALATION) at 07:44

## 2019-01-01 RX ADMIN — ISODIUM CHLORIDE 3 ML: 0.03 SOLUTION RESPIRATORY (INHALATION) at 02:44

## 2019-01-01 RX ADMIN — MORPHINE SULFATE 2 MG: 2 INJECTION, SOLUTION INTRAMUSCULAR; INTRAVENOUS at 12:45

## 2019-01-01 RX ADMIN — LORAZEPAM 0.5 MG: 2 INJECTION INTRAMUSCULAR; INTRAVENOUS at 08:00

## 2019-01-01 RX ADMIN — SODIUM CHLORIDE 75 ML/HR: 0.9 INJECTION, SOLUTION INTRAVENOUS at 06:07

## 2019-01-01 RX ADMIN — IPRATROPIUM BROMIDE 0.5 MG: 0.5 SOLUTION RESPIRATORY (INHALATION) at 19:59

## 2019-01-01 RX ADMIN — IPRATROPIUM BROMIDE 0.5 MG: 0.5 SOLUTION RESPIRATORY (INHALATION) at 04:47

## 2019-01-01 RX ADMIN — LEVALBUTEROL 1.25 MG: 1.25 SOLUTION, CONCENTRATE RESPIRATORY (INHALATION) at 13:14

## 2019-01-01 RX ADMIN — MORPHINE SULFATE 2 MG: 2 INJECTION, SOLUTION INTRAMUSCULAR; INTRAVENOUS at 04:51

## 2019-01-01 RX ADMIN — DIGOXIN 250 MCG: 0.25 INJECTION INTRAMUSCULAR; INTRAVENOUS at 16:35

## 2019-01-01 RX ADMIN — IPRATROPIUM BROMIDE 0.5 MG: 0.5 SOLUTION RESPIRATORY (INHALATION) at 07:14

## 2019-01-01 RX ADMIN — MORPHINE SULFATE 2 MG: 2 INJECTION, SOLUTION INTRAMUSCULAR; INTRAVENOUS at 10:41

## 2019-01-01 RX ADMIN — LEVALBUTEROL 1.25 MG: 1.25 SOLUTION, CONCENTRATE RESPIRATORY (INHALATION) at 19:09

## 2019-01-01 RX ADMIN — LEVALBUTEROL 1.25 MG: 1.25 SOLUTION, CONCENTRATE RESPIRATORY (INHALATION) at 19:59

## 2019-01-01 RX ADMIN — POTASSIUM CHLORIDE, DEXTROSE MONOHYDRATE AND SODIUM CHLORIDE 75 ML/HR: 150; 5; 900 INJECTION, SOLUTION INTRAVENOUS at 11:57

## 2019-01-01 RX ADMIN — DILTIAZEM HYDROCHLORIDE 5 MG/HR: 5 INJECTION INTRAVENOUS at 22:35

## 2019-01-01 RX ADMIN — LORAZEPAM 0.5 MG: 2 INJECTION INTRAMUSCULAR; INTRAVENOUS at 00:47

## 2019-01-01 RX ADMIN — IPRATROPIUM BROMIDE 0.5 MG: 0.5 SOLUTION RESPIRATORY (INHALATION) at 14:37

## 2019-01-01 RX ADMIN — ISODIUM CHLORIDE 3 ML: 0.03 SOLUTION RESPIRATORY (INHALATION) at 19:46

## 2019-01-01 RX ADMIN — METOPROLOL TARTRATE 2.5 MG: 5 INJECTION, SOLUTION INTRAVENOUS at 10:11

## 2019-01-01 RX ADMIN — DIGOXIN 62.5 MCG: 0.25 INJECTION INTRAMUSCULAR; INTRAVENOUS at 19:42

## 2019-01-01 RX ADMIN — IPRATROPIUM BROMIDE 0.5 MG: 0.5 SOLUTION RESPIRATORY (INHALATION) at 13:52

## 2019-01-01 RX ADMIN — IPRATROPIUM BROMIDE 0.5 MG: 0.5 SOLUTION RESPIRATORY (INHALATION) at 07:17

## 2019-01-01 RX ADMIN — METOPROLOL TARTRATE 5 MG: 5 INJECTION, SOLUTION INTRAVENOUS at 16:51

## 2019-01-01 RX ADMIN — IPRATROPIUM BROMIDE 0.5 MG: 0.5 SOLUTION RESPIRATORY (INHALATION) at 14:30

## 2019-01-01 RX ADMIN — IPRATROPIUM BROMIDE 0.5 MG: 0.5 SOLUTION RESPIRATORY (INHALATION) at 01:21

## 2019-01-01 RX ADMIN — IPRATROPIUM BROMIDE 0.5 MG: 0.5 SOLUTION RESPIRATORY (INHALATION) at 13:14

## 2019-01-01 RX ADMIN — IPRATROPIUM BROMIDE 0.5 MG: 0.5 SOLUTION RESPIRATORY (INHALATION) at 01:44

## 2019-01-01 RX ADMIN — LEVALBUTEROL 1.25 MG: 1.25 SOLUTION, CONCENTRATE RESPIRATORY (INHALATION) at 01:21

## 2019-01-01 RX ADMIN — HEPARIN SODIUM 5000 UNITS: 5000 INJECTION INTRAVENOUS; SUBCUTANEOUS at 13:25

## 2019-01-01 RX ADMIN — SODIUM CHLORIDE 100 ML/HR: 0.9 INJECTION, SOLUTION INTRAVENOUS at 05:29

## 2019-01-01 RX ADMIN — METRONIDAZOLE 500 MG: 500 INJECTION, SOLUTION INTRAVENOUS at 05:34

## 2019-01-01 RX ADMIN — IPRATROPIUM BROMIDE 0.5 MG: 0.5 SOLUTION RESPIRATORY (INHALATION) at 08:31

## 2019-01-01 RX ADMIN — SODIUM CHLORIDE 100 ML/HR: 0.9 INJECTION, SOLUTION INTRAVENOUS at 05:14

## 2019-01-01 RX ADMIN — ISODIUM CHLORIDE 3 ML: 0.03 SOLUTION RESPIRATORY (INHALATION) at 08:31

## 2019-01-01 RX ADMIN — IPRATROPIUM BROMIDE 0.5 MG: 0.5 SOLUTION RESPIRATORY (INHALATION) at 13:07

## 2019-01-01 RX ADMIN — SODIUM CHLORIDE 100 ML/HR: 0.9 INJECTION, SOLUTION INTRAVENOUS at 04:31

## 2019-01-01 RX ADMIN — LEVALBUTEROL 1.25 MG: 1.25 SOLUTION, CONCENTRATE RESPIRATORY (INHALATION) at 20:14

## 2019-01-01 RX ADMIN — METOPROLOL TARTRATE 2.5 MG: 5 INJECTION, SOLUTION INTRAVENOUS at 00:45

## 2019-01-01 RX ADMIN — LEVALBUTEROL 1.25 MG: 1.25 SOLUTION, CONCENTRATE RESPIRATORY (INHALATION) at 13:51

## 2019-01-01 RX ADMIN — MORPHINE SULFATE 2 MG: 2 INJECTION, SOLUTION INTRAMUSCULAR; INTRAVENOUS at 06:39

## 2019-01-01 RX ADMIN — SODIUM CHLORIDE 125 ML/HR: 0.9 INJECTION, SOLUTION INTRAVENOUS at 11:15

## 2019-01-01 RX ADMIN — ASPIRIN 300 MG: 300 SUPPOSITORY RECTAL at 21:15

## 2019-01-01 RX ADMIN — ISODIUM CHLORIDE 3 ML: 0.03 SOLUTION RESPIRATORY (INHALATION) at 13:24

## 2019-01-01 RX ADMIN — HYDROMORPHONE HYDROCHLORIDE 0.2 MG: 1 INJECTION, SOLUTION INTRAMUSCULAR; INTRAVENOUS; SUBCUTANEOUS at 16:15

## 2019-01-01 RX ADMIN — METOPROLOL TARTRATE 5 MG: 5 INJECTION, SOLUTION INTRAVENOUS at 21:24

## 2019-01-01 RX ADMIN — DILTIAZEM HYDROCHLORIDE 5 MG/HR: 5 INJECTION INTRAVENOUS at 21:34

## 2019-01-01 RX ADMIN — METRONIDAZOLE 500 MG: 500 INJECTION, SOLUTION INTRAVENOUS at 13:32

## 2019-01-01 RX ADMIN — LEVALBUTEROL 1.25 MG: 1.25 SOLUTION, CONCENTRATE RESPIRATORY (INHALATION) at 07:17

## 2019-01-01 RX ADMIN — MORPHINE SULFATE 2 MG: 2 INJECTION, SOLUTION INTRAMUSCULAR; INTRAVENOUS at 20:05

## 2019-01-01 RX ADMIN — MORPHINE SULFATE 2 MG: 2 INJECTION, SOLUTION INTRAMUSCULAR; INTRAVENOUS at 19:04

## 2019-01-01 RX ADMIN — MORPHINE SULFATE 2 MG: 2 INJECTION, SOLUTION INTRAMUSCULAR; INTRAVENOUS at 23:00

## 2019-01-01 RX ADMIN — ATORVASTATIN CALCIUM 40 MG: 40 TABLET, FILM COATED ORAL at 18:06

## 2019-01-01 RX ADMIN — ISODIUM CHLORIDE 3 ML: 0.03 SOLUTION RESPIRATORY (INHALATION) at 14:41

## 2019-01-01 RX ADMIN — MORPHINE SULFATE 2 MG: 2 INJECTION, SOLUTION INTRAMUSCULAR; INTRAVENOUS at 23:01

## 2019-01-01 RX ADMIN — MORPHINE SULFATE 2 MG: 2 INJECTION, SOLUTION INTRAMUSCULAR; INTRAVENOUS at 10:10

## 2019-01-01 RX ADMIN — DIGOXIN 125 MCG: 0.25 INJECTION INTRAMUSCULAR; INTRAVENOUS at 08:27

## 2019-01-01 RX ADMIN — METOPROLOL TARTRATE 5 MG: 5 INJECTION, SOLUTION INTRAVENOUS at 04:26

## 2019-01-01 RX ADMIN — DILTIAZEM HYDROCHLORIDE 10 MG: 5 INJECTION INTRAVENOUS at 08:35

## 2019-01-01 RX ADMIN — HEPARIN SODIUM 5000 UNITS: 5000 INJECTION INTRAVENOUS; SUBCUTANEOUS at 13:58

## 2019-01-01 RX ADMIN — ISODIUM CHLORIDE 3 ML: 0.03 SOLUTION RESPIRATORY (INHALATION) at 07:35

## 2019-01-01 RX ADMIN — POTASSIUM CHLORIDE 20 MEQ: 200 INJECTION, SOLUTION INTRAVENOUS at 12:51

## 2019-01-01 RX ADMIN — LEVALBUTEROL 1.25 MG: 1.25 SOLUTION, CONCENTRATE RESPIRATORY (INHALATION) at 20:53

## 2019-01-01 RX ADMIN — LEVALBUTEROL 1.25 MG: 1.25 SOLUTION, CONCENTRATE RESPIRATORY (INHALATION) at 07:44

## 2019-01-01 RX ADMIN — METOPROLOL TARTRATE 2.5 MG: 5 INJECTION, SOLUTION INTRAVENOUS at 17:28

## 2019-01-01 RX ADMIN — FUROSEMIDE 20 MG: 10 INJECTION, SOLUTION INTRAMUSCULAR; INTRAVENOUS at 09:34

## 2019-01-01 RX ADMIN — IPRATROPIUM BROMIDE 0.5 MG: 0.5 SOLUTION RESPIRATORY (INHALATION) at 08:57

## 2019-01-01 RX ADMIN — ASPIRIN 300 MG: 300 SUPPOSITORY RECTAL at 21:39

## 2019-01-01 RX ADMIN — LEVALBUTEROL 1.25 MG: 1.25 SOLUTION, CONCENTRATE RESPIRATORY (INHALATION) at 02:44

## 2019-01-01 RX ADMIN — POTASSIUM CHLORIDE, DEXTROSE MONOHYDRATE AND SODIUM CHLORIDE 75 ML/HR: 150; 5; 900 INJECTION, SOLUTION INTRAVENOUS at 22:10

## 2019-01-01 RX ADMIN — LEVALBUTEROL 1.25 MG: 1.25 SOLUTION, CONCENTRATE RESPIRATORY (INHALATION) at 19:40

## 2019-01-01 RX ADMIN — IPRATROPIUM BROMIDE 0.5 MG: 0.5 SOLUTION RESPIRATORY (INHALATION) at 20:01

## 2019-01-01 RX ADMIN — MORPHINE SULFATE 2 MG: 2 INJECTION, SOLUTION INTRAMUSCULAR; INTRAVENOUS at 01:56

## 2019-01-01 RX ADMIN — LEVALBUTEROL 1.25 MG: 1.25 SOLUTION, CONCENTRATE RESPIRATORY (INHALATION) at 14:37

## 2019-01-01 RX ADMIN — ISODIUM CHLORIDE 3 ML: 0.03 SOLUTION RESPIRATORY (INHALATION) at 04:47

## 2019-01-01 RX ADMIN — IPRATROPIUM BROMIDE 0.5 MG: 0.5 SOLUTION RESPIRATORY (INHALATION) at 20:53

## 2019-01-01 RX ADMIN — MORPHINE SULFATE 2 MG: 2 INJECTION, SOLUTION INTRAMUSCULAR; INTRAVENOUS at 11:42

## 2019-01-01 RX ADMIN — POTASSIUM CHLORIDE, DEXTROSE MONOHYDRATE AND SODIUM CHLORIDE 75 ML/HR: 150; 5; 900 INJECTION, SOLUTION INTRAVENOUS at 00:44

## 2019-01-01 RX ADMIN — MORPHINE SULFATE 2 MG: 2 INJECTION, SOLUTION INTRAMUSCULAR; INTRAVENOUS at 03:02

## 2019-01-01 RX ADMIN — ISODIUM CHLORIDE 3 ML: 0.03 SOLUTION RESPIRATORY (INHALATION) at 07:52

## 2019-01-01 RX ADMIN — LORAZEPAM 0.5 MG: 2 INJECTION INTRAMUSCULAR; INTRAVENOUS at 17:40

## 2019-01-01 RX ADMIN — ISODIUM CHLORIDE 3 ML: 0.03 SOLUTION RESPIRATORY (INHALATION) at 07:14

## 2019-01-01 RX ADMIN — LEVALBUTEROL 1.25 MG: 1.25 SOLUTION, CONCENTRATE RESPIRATORY (INHALATION) at 13:24

## 2019-01-01 RX ADMIN — HYDROMORPHONE HYDROCHLORIDE 0.2 MG: 1 INJECTION, SOLUTION INTRAMUSCULAR; INTRAVENOUS; SUBCUTANEOUS at 14:53

## 2019-01-01 RX ADMIN — ISODIUM CHLORIDE 3 ML: 0.03 SOLUTION RESPIRATORY (INHALATION) at 04:23

## 2019-01-01 RX ADMIN — HEPARIN SODIUM AND DEXTROSE 800 UNITS/HR: 10000; 5 INJECTION INTRAVENOUS at 05:29

## 2019-01-01 RX ADMIN — DIGOXIN 125 MCG: 0.25 INJECTION INTRAMUSCULAR; INTRAVENOUS at 08:35

## 2019-01-01 RX ADMIN — ISODIUM CHLORIDE 3 ML: 0.03 SOLUTION RESPIRATORY (INHALATION) at 14:37

## 2019-01-01 RX ADMIN — HEPARIN SODIUM AND DEXTROSE 1350 UNITS/HR: 10000; 5 INJECTION INTRAVENOUS at 10:25

## 2019-01-01 RX ADMIN — ASPIRIN 300 MG: 300 SUPPOSITORY RECTAL at 20:58

## 2019-01-01 RX ADMIN — IPRATROPIUM BROMIDE 0.5 MG: 0.5 SOLUTION RESPIRATORY (INHALATION) at 02:52

## 2019-01-01 RX ADMIN — LEVALBUTEROL 1.25 MG: 1.25 SOLUTION, CONCENTRATE RESPIRATORY (INHALATION) at 14:41

## 2019-01-01 RX ADMIN — ISODIUM CHLORIDE 3 ML: 0.03 SOLUTION RESPIRATORY (INHALATION) at 20:14

## 2019-01-01 RX ADMIN — ISODIUM CHLORIDE 3 ML: 0.03 SOLUTION RESPIRATORY (INHALATION) at 01:21

## 2019-01-01 RX ADMIN — MORPHINE SULFATE 2 MG: 2 INJECTION, SOLUTION INTRAMUSCULAR; INTRAVENOUS at 17:25

## 2019-01-01 RX ADMIN — METOPROLOL TARTRATE 2.5 MG: 5 INJECTION, SOLUTION INTRAVENOUS at 06:06

## 2019-01-01 RX ADMIN — ASPIRIN 300 MG: 300 SUPPOSITORY RECTAL at 21:09

## 2019-01-01 RX ADMIN — CEFEPIME HYDROCHLORIDE 2000 MG: 2 INJECTION, POWDER, FOR SOLUTION INTRAVENOUS at 01:29

## 2019-01-01 RX ADMIN — HYDROMORPHONE HYDROCHLORIDE 0.2 MG: 1 INJECTION, SOLUTION INTRAMUSCULAR; INTRAVENOUS; SUBCUTANEOUS at 17:56

## 2019-01-01 RX ADMIN — IPRATROPIUM BROMIDE 0.5 MG: 0.5 SOLUTION RESPIRATORY (INHALATION) at 20:24

## 2019-01-01 RX ADMIN — METOPROLOL TARTRATE 5 MG: 5 INJECTION, SOLUTION INTRAVENOUS at 08:34

## 2019-01-01 RX ADMIN — LEVALBUTEROL 1.25 MG: 1.25 SOLUTION, CONCENTRATE RESPIRATORY (INHALATION) at 08:11

## 2019-01-01 RX ADMIN — HYDROMORPHONE HYDROCHLORIDE 0.2 MG: 1 INJECTION, SOLUTION INTRAMUSCULAR; INTRAVENOUS; SUBCUTANEOUS at 12:04

## 2019-01-01 RX ADMIN — ISODIUM CHLORIDE 3 ML: 0.03 SOLUTION RESPIRATORY (INHALATION) at 13:14

## 2019-01-01 RX ADMIN — MORPHINE SULFATE 2 MG: 2 INJECTION, SOLUTION INTRAMUSCULAR; INTRAVENOUS at 22:52

## 2019-01-01 RX ADMIN — IPRATROPIUM BROMIDE 0.5 MG: 0.5 SOLUTION RESPIRATORY (INHALATION) at 07:05

## 2019-01-01 RX ADMIN — LORAZEPAM 0.5 MG: 2 INJECTION INTRAMUSCULAR; INTRAVENOUS at 04:57

## 2019-01-01 RX ADMIN — LEVALBUTEROL 1.25 MG: 1.25 SOLUTION, CONCENTRATE RESPIRATORY (INHALATION) at 20:24

## 2019-01-01 RX ADMIN — MORPHINE SULFATE 2 MG: 2 INJECTION, SOLUTION INTRAMUSCULAR; INTRAVENOUS at 08:43

## 2019-01-01 RX ADMIN — IPRATROPIUM BROMIDE 0.5 MG: 0.5 SOLUTION RESPIRATORY (INHALATION) at 14:24

## 2019-01-01 RX ADMIN — ISODIUM CHLORIDE 3 ML: 0.03 SOLUTION RESPIRATORY (INHALATION) at 07:44

## 2019-01-01 RX ADMIN — HEPARIN SODIUM AND DEXTROSE 1500 UNITS/HR: 10000; 5 INJECTION INTRAVENOUS at 23:36

## 2019-01-01 RX ADMIN — ISODIUM CHLORIDE 3 ML: 0.03 SOLUTION RESPIRATORY (INHALATION) at 07:28

## 2019-01-01 RX ADMIN — IPRATROPIUM BROMIDE 0.5 MG: 0.5 SOLUTION RESPIRATORY (INHALATION) at 01:13

## 2019-01-01 RX ADMIN — IPRATROPIUM BROMIDE 0.5 MG: 0.5 SOLUTION RESPIRATORY (INHALATION) at 01:50

## 2019-01-01 RX ADMIN — HYOSCYAMINE SULFATE 0.12 MG: 0.12 TABLET ORAL at 08:46

## 2019-01-01 RX ADMIN — LEVALBUTEROL 1.25 MG: 1.25 SOLUTION, CONCENTRATE RESPIRATORY (INHALATION) at 07:28

## 2019-01-01 RX ADMIN — ISODIUM CHLORIDE 3 ML: 0.03 SOLUTION RESPIRATORY (INHALATION) at 19:09

## 2019-01-01 RX ADMIN — ALBUMIN (HUMAN) 25 G: 12.5 SOLUTION INTRAVENOUS at 15:04

## 2019-01-01 RX ADMIN — HYDROMORPHONE HYDROCHLORIDE 0.2 MG: 1 INJECTION, SOLUTION INTRAMUSCULAR; INTRAVENOUS; SUBCUTANEOUS at 12:49

## 2019-01-01 RX ADMIN — HYDROMORPHONE HYDROCHLORIDE 0.2 MG: 1 INJECTION, SOLUTION INTRAMUSCULAR; INTRAVENOUS; SUBCUTANEOUS at 21:12

## 2019-01-01 RX ADMIN — LEVALBUTEROL 1.25 MG: 1.25 SOLUTION, CONCENTRATE RESPIRATORY (INHALATION) at 07:11

## 2019-01-01 RX ADMIN — MORPHINE SULFATE 2 MG: 2 INJECTION, SOLUTION INTRAMUSCULAR; INTRAVENOUS at 15:11

## 2019-01-01 RX ADMIN — HEPARIN SODIUM 5000 UNITS: 5000 INJECTION INTRAVENOUS; SUBCUTANEOUS at 22:42

## 2019-01-01 RX ADMIN — DIGOXIN 62.5 MCG: 0.25 INJECTION INTRAMUSCULAR; INTRAVENOUS at 13:22

## 2019-01-01 RX ADMIN — LEVALBUTEROL 1.25 MG: 1.25 SOLUTION, CONCENTRATE RESPIRATORY (INHALATION) at 07:05

## 2019-01-01 RX ADMIN — MORPHINE SULFATE 2 MG: 2 INJECTION, SOLUTION INTRAMUSCULAR; INTRAVENOUS at 14:33

## 2019-01-01 RX ADMIN — MORPHINE SULFATE 2 MG: 2 INJECTION, SOLUTION INTRAMUSCULAR; INTRAVENOUS at 16:25

## 2019-01-01 RX ADMIN — LEVALBUTEROL 1.25 MG: 1.25 SOLUTION, CONCENTRATE RESPIRATORY (INHALATION) at 01:50

## 2019-01-01 RX ADMIN — SODIUM CHLORIDE 100 ML/HR: 0.9 INJECTION, SOLUTION INTRAVENOUS at 21:45

## 2019-01-01 RX ADMIN — LEVALBUTEROL 1.25 MG: 1.25 SOLUTION, CONCENTRATE RESPIRATORY (INHALATION) at 20:33

## 2019-01-01 RX ADMIN — IPRATROPIUM BROMIDE 0.5 MG: 0.5 SOLUTION RESPIRATORY (INHALATION) at 20:33

## 2019-01-01 RX ADMIN — HEPARIN SODIUM AND DEXTROSE 1500 UNITS/HR: 10000; 5 INJECTION INTRAVENOUS at 05:35

## 2019-01-01 RX ADMIN — SODIUM CHLORIDE 100 ML/HR: 0.9 INJECTION, SOLUTION INTRAVENOUS at 18:05

## 2019-01-01 RX ADMIN — FUROSEMIDE 20 MG: 10 INJECTION, SOLUTION INTRAMUSCULAR; INTRAVENOUS at 10:33

## 2019-01-01 RX ADMIN — IPRATROPIUM BROMIDE 0.5 MG: 0.5 SOLUTION RESPIRATORY (INHALATION) at 20:14

## 2019-01-01 RX ADMIN — METOPROLOL TARTRATE 2.5 MG: 5 INJECTION, SOLUTION INTRAVENOUS at 17:35

## 2019-01-01 RX ADMIN — SODIUM CHLORIDE 1000 ML: 0.9 INJECTION, SOLUTION INTRAVENOUS at 19:45

## 2019-01-01 RX ADMIN — DIGOXIN 62.5 MCG: 0.25 INJECTION INTRAMUSCULAR; INTRAVENOUS at 08:23

## 2019-01-01 RX ADMIN — IPRATROPIUM BROMIDE 0.5 MG: 0.5 SOLUTION RESPIRATORY (INHALATION) at 08:10

## 2019-01-01 RX ADMIN — ISODIUM CHLORIDE 3 ML: 0.03 SOLUTION RESPIRATORY (INHALATION) at 20:33

## 2019-01-01 RX ADMIN — LEVALBUTEROL 1.25 MG: 1.25 SOLUTION, CONCENTRATE RESPIRATORY (INHALATION) at 20:00

## 2019-01-01 RX ADMIN — LEVALBUTEROL 1.25 MG: 1.25 SOLUTION, CONCENTRATE RESPIRATORY (INHALATION) at 01:44

## 2019-01-01 RX ADMIN — MORPHINE SULFATE 2 MG: 2 INJECTION, SOLUTION INTRAMUSCULAR; INTRAVENOUS at 11:01

## 2019-01-01 RX ADMIN — DEXTROSE, SODIUM CHLORIDE, AND POTASSIUM CHLORIDE 75 ML/HR: 5; .45; .15 INJECTION INTRAVENOUS at 10:07

## 2019-01-01 RX ADMIN — IPRATROPIUM BROMIDE 0.5 MG: 0.5 SOLUTION RESPIRATORY (INHALATION) at 07:11

## 2019-01-01 RX ADMIN — LEVALBUTEROL 1.25 MG: 1.25 SOLUTION, CONCENTRATE RESPIRATORY (INHALATION) at 14:30

## 2019-01-01 RX ADMIN — ALBUMIN (HUMAN) 25 G: 12.5 SOLUTION INTRAVENOUS at 04:59

## 2019-01-01 RX ADMIN — SODIUM CHLORIDE 150 ML/HR: 0.9 INJECTION, SOLUTION INTRAVENOUS at 20:45

## 2019-01-01 RX ADMIN — DIGOXIN 125 MCG: 0.25 INJECTION INTRAMUSCULAR; INTRAVENOUS at 09:08

## 2019-01-01 RX ADMIN — METOPROLOL TARTRATE 2.5 MG: 5 INJECTION, SOLUTION INTRAVENOUS at 11:17

## 2019-01-01 RX ADMIN — IPRATROPIUM BROMIDE 0.5 MG: 0.5 SOLUTION RESPIRATORY (INHALATION) at 07:35

## 2019-01-01 RX ADMIN — HEPARIN SODIUM 5000 UNITS: 5000 INJECTION INTRAVENOUS; SUBCUTANEOUS at 21:38

## 2019-01-01 RX ADMIN — IPRATROPIUM BROMIDE 0.5 MG: 0.5 SOLUTION RESPIRATORY (INHALATION) at 14:41

## 2019-01-01 RX ADMIN — POTASSIUM CHLORIDE, DEXTROSE MONOHYDRATE AND SODIUM CHLORIDE 75 ML/HR: 150; 5; 900 INJECTION, SOLUTION INTRAVENOUS at 11:55

## 2019-01-01 RX ADMIN — LEVALBUTEROL 1.25 MG: 1.25 SOLUTION, CONCENTRATE RESPIRATORY (INHALATION) at 02:51

## 2019-01-01 RX ADMIN — POTASSIUM CHLORIDE 20 MEQ: 200 INJECTION, SOLUTION INTRAVENOUS at 10:06

## 2019-01-01 RX ADMIN — POTASSIUM CHLORIDE, DEXTROSE MONOHYDRATE AND SODIUM CHLORIDE 75 ML/HR: 150; 5; 900 INJECTION, SOLUTION INTRAVENOUS at 13:33

## 2019-01-01 RX ADMIN — LEVALBUTEROL 1.25 MG: 1.25 SOLUTION, CONCENTRATE RESPIRATORY (INHALATION) at 08:57

## 2019-01-01 RX ADMIN — LEVALBUTEROL 1.25 MG: 1.25 SOLUTION, CONCENTRATE RESPIRATORY (INHALATION) at 04:23

## 2019-01-01 RX ADMIN — DIGOXIN 125 MCG: 0.25 INJECTION INTRAMUSCULAR; INTRAVENOUS at 08:21

## 2019-01-01 RX ADMIN — DILTIAZEM HYDROCHLORIDE 5 MG/HR: 5 INJECTION INTRAVENOUS at 09:54

## 2019-01-01 RX ADMIN — HEPARIN SODIUM AND DEXTROSE 300 UNITS/HR: 10000; 5 INJECTION INTRAVENOUS at 15:50

## 2019-01-01 RX ADMIN — IPRATROPIUM BROMIDE 0.5 MG: 0.5 SOLUTION RESPIRATORY (INHALATION) at 20:09

## 2019-01-01 RX ADMIN — SODIUM CHLORIDE 150 ML/HR: 0.9 INJECTION, SOLUTION INTRAVENOUS at 15:03

## 2019-01-01 RX ADMIN — IPRATROPIUM BROMIDE 0.5 MG: 0.5 SOLUTION RESPIRATORY (INHALATION) at 19:09

## 2019-01-01 RX ADMIN — LEVALBUTEROL 1.25 MG: 1.25 SOLUTION, CONCENTRATE RESPIRATORY (INHALATION) at 20:09

## 2019-01-01 RX ADMIN — IPRATROPIUM BROMIDE AND ALBUTEROL SULFATE 3 ML: 2.5; .5 SOLUTION RESPIRATORY (INHALATION) at 05:08

## 2019-01-01 RX ADMIN — METOPROLOL TARTRATE 2.5 MG: 5 INJECTION, SOLUTION INTRAVENOUS at 05:35

## 2019-01-01 RX ADMIN — ISODIUM CHLORIDE 3 ML: 0.03 SOLUTION RESPIRATORY (INHALATION) at 19:40

## 2019-01-01 RX ADMIN — POTASSIUM CHLORIDE, DEXTROSE MONOHYDRATE AND SODIUM CHLORIDE 75 ML/HR: 150; 5; 900 INJECTION, SOLUTION INTRAVENOUS at 15:39

## 2019-01-01 RX ADMIN — ACETAMINOPHEN 325 MG: 650 SUPPOSITORY RECTAL at 07:52

## 2019-01-01 RX ADMIN — ASPIRIN 300 MG: 300 SUPPOSITORY RECTAL at 21:25

## 2019-01-01 RX ADMIN — MORPHINE SULFATE 2 MG: 2 INJECTION, SOLUTION INTRAMUSCULAR; INTRAVENOUS at 19:11

## 2019-01-01 RX ADMIN — LEVALBUTEROL 1.25 MG: 1.25 SOLUTION, CONCENTRATE RESPIRATORY (INHALATION) at 13:07

## 2019-01-01 RX ADMIN — LEVALBUTEROL 1.25 MG: 1.25 SOLUTION, CONCENTRATE RESPIRATORY (INHALATION) at 14:24

## 2019-01-01 RX ADMIN — METOPROLOL TARTRATE 2.5 MG: 5 INJECTION, SOLUTION INTRAVENOUS at 12:28

## 2019-01-01 RX ADMIN — HYDROMORPHONE HYDROCHLORIDE 0.2 MG: 1 INJECTION, SOLUTION INTRAMUSCULAR; INTRAVENOUS; SUBCUTANEOUS at 19:57

## 2019-01-01 RX ADMIN — SCOPALAMINE 1 PATCH: 1 PATCH, EXTENDED RELEASE TRANSDERMAL at 16:26

## 2019-01-01 RX ADMIN — LEVALBUTEROL 1.25 MG: 1.25 SOLUTION, CONCENTRATE RESPIRATORY (INHALATION) at 07:35

## 2019-01-01 RX ADMIN — POTASSIUM CHLORIDE 20 MEQ: 200 INJECTION, SOLUTION INTRAVENOUS at 09:22

## 2019-01-01 RX ADMIN — IPRATROPIUM BROMIDE 0.5 MG: 0.5 SOLUTION RESPIRATORY (INHALATION) at 13:24

## 2019-01-01 RX ADMIN — METOPROLOL TARTRATE 2.5 MG: 5 INJECTION, SOLUTION INTRAVENOUS at 21:22

## 2019-01-01 RX ADMIN — SODIUM CHLORIDE 500 ML: 0.9 INJECTION, SOLUTION INTRAVENOUS at 00:30

## 2019-01-01 RX ADMIN — ISODIUM CHLORIDE 3 ML: 0.03 SOLUTION RESPIRATORY (INHALATION) at 14:24

## 2019-01-01 RX ADMIN — ISODIUM CHLORIDE 3 ML: 0.03 SOLUTION RESPIRATORY (INHALATION) at 19:59

## 2019-01-01 RX ADMIN — METOPROLOL TARTRATE 2.5 MG: 5 INJECTION, SOLUTION INTRAVENOUS at 00:21

## 2019-01-01 RX ADMIN — POTASSIUM CHLORIDE, DEXTROSE MONOHYDRATE AND SODIUM CHLORIDE 75 ML/HR: 150; 5; 900 INJECTION, SOLUTION INTRAVENOUS at 02:41

## 2019-01-01 RX ADMIN — ISODIUM CHLORIDE 3 ML: 0.03 SOLUTION RESPIRATORY (INHALATION) at 20:24

## 2019-01-01 RX ADMIN — MORPHINE SULFATE 2 MG: 2 INJECTION, SOLUTION INTRAMUSCULAR; INTRAVENOUS at 07:44

## 2019-01-01 RX ADMIN — CEFEPIME HYDROCHLORIDE 2000 MG: 2 INJECTION, POWDER, FOR SOLUTION INTRAVENOUS at 14:57

## 2019-01-01 RX ADMIN — ISODIUM CHLORIDE 3 ML: 0.03 SOLUTION RESPIRATORY (INHALATION) at 20:53

## 2019-01-01 RX ADMIN — LEVALBUTEROL 1.25 MG: 1.25 SOLUTION, CONCENTRATE RESPIRATORY (INHALATION) at 04:47

## 2019-01-01 RX ADMIN — LEVALBUTEROL 1.25 MG: 1.25 SOLUTION, CONCENTRATE RESPIRATORY (INHALATION) at 19:46

## 2019-01-01 RX ADMIN — ISODIUM CHLORIDE 3 ML: 0.03 SOLUTION RESPIRATORY (INHALATION) at 13:07

## 2019-01-01 RX ADMIN — MORPHINE SULFATE 2 MG: 2 INJECTION, SOLUTION INTRAMUSCULAR; INTRAVENOUS at 11:14

## 2019-01-01 RX ADMIN — IPRATROPIUM BROMIDE 0.5 MG: 0.5 SOLUTION RESPIRATORY (INHALATION) at 07:52

## 2019-01-01 RX ADMIN — LEVALBUTEROL 1.25 MG: 1.25 SOLUTION, CONCENTRATE RESPIRATORY (INHALATION) at 07:52

## 2019-01-01 RX ADMIN — IPRATROPIUM BROMIDE AND ALBUTEROL SULFATE 3 ML: 2.5; .5 SOLUTION RESPIRATORY (INHALATION) at 20:03

## 2019-01-01 RX ADMIN — ISODIUM CHLORIDE 3 ML: 0.03 SOLUTION RESPIRATORY (INHALATION) at 07:11

## 2019-01-01 RX ADMIN — ISODIUM CHLORIDE 3 ML: 0.03 SOLUTION RESPIRATORY (INHALATION) at 02:51

## 2019-01-01 RX ADMIN — IPRATROPIUM BROMIDE 0.5 MG: 0.5 SOLUTION RESPIRATORY (INHALATION) at 19:46

## 2019-01-01 RX ADMIN — HYDROMORPHONE HYDROCHLORIDE 0.2 MG: 1 INJECTION, SOLUTION INTRAMUSCULAR; INTRAVENOUS; SUBCUTANEOUS at 02:40

## 2019-01-01 RX ADMIN — HEPARIN SODIUM 5000 UNITS: 5000 INJECTION INTRAVENOUS; SUBCUTANEOUS at 15:03

## 2019-01-01 RX ADMIN — ISODIUM CHLORIDE 3 ML: 0.03 SOLUTION RESPIRATORY (INHALATION) at 01:13

## 2019-01-01 RX ADMIN — HEPARIN SODIUM 5000 UNITS: 5000 INJECTION INTRAVENOUS; SUBCUTANEOUS at 05:28

## 2019-01-01 RX ADMIN — METOPROLOL TARTRATE 2.5 MG: 5 INJECTION, SOLUTION INTRAVENOUS at 12:32

## 2019-01-01 RX ADMIN — METRONIDAZOLE 500 MG: 500 INJECTION, SOLUTION INTRAVENOUS at 21:28

## 2019-07-17 PROBLEM — R74.8 ABNORMAL CK: Status: ACTIVE | Noted: 2019-01-01

## 2019-07-17 PROBLEM — I63.311 CEREBROVASCULAR ACCIDENT (CVA) DUE TO THROMBOSIS OF RIGHT MIDDLE CEREBRAL ARTERY (HCC): Status: ACTIVE | Noted: 2019-01-01

## 2019-07-17 PROBLEM — R79.89 ELEVATED LACTIC ACID LEVEL: Status: ACTIVE | Noted: 2019-01-01

## 2019-07-17 PROBLEM — R79.89 ELEVATED SERUM CREATININE: Status: ACTIVE | Noted: 2019-01-01

## 2019-07-17 NOTE — QUICK NOTE
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

## 2019-07-18 PROBLEM — R13.10 IMPAIRED SWALLOWING: Status: ACTIVE | Noted: 2019-01-01

## 2019-07-18 PROBLEM — F41.8 DEPRESSION WITH ANXIETY: Status: ACTIVE | Noted: 2019-01-01

## 2019-07-18 PROBLEM — R33.9 URINARY RETENTION: Status: ACTIVE | Noted: 2019-01-01

## 2019-07-18 PROBLEM — S32.511A CLOSED FRACTURE OF RIGHT SUPERIOR PUBIC RAMUS (HCC): Status: ACTIVE | Noted: 2019-01-01

## 2019-07-18 PROBLEM — D72.829 LEUKOCYTOSIS: Status: ACTIVE | Noted: 2019-01-01

## 2019-07-18 NOTE — CONSULTS
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   2  Severe right internal carotid artery stenosis  3  Occlusion of right MCA origin  4  Minimal distal, right M3 stenosis  5  Elevated CK, possibly traumatic    Right MCA CVA secondary to severe right ICA stenosis with noted right M3 stenosis as well possibly contributing  The patient has a relatively uncomplicated medical history except for malignant colon cancer in the past   He should continue 324 mg aspirin daily in the hospital, possibly load with Plavix per Dr Malvin Bright, as well as 40 mg atorvastatin  When CK decreases we can increase the atorvastatin 80 mg for 3-6 months post stroke  Vascular consult rec  Blood cultures pending  Echo pending  Recommend frequent neuro checks, at least q 2- 4 hours  PT, OT and speech  Recommend discharge to rehab facility  Wounds/ pressure ulcers documented x3 on the left foot and buttocks, therefore wound specialist/ podiatry suggested  Attending neurologist note and plan to follow  HPI:    Mr Lovely Galicia is a very pleasant 52-year-old ? right-handed male with a past medical history of malignant colon cancer and most recently insomnia  He presented to the ED yesterday 7/17/2019 via EMS for chief complaint of left-sided weakness altered mental status  BP was elevated on exam in the ED to 179/106, RR 26, HR 70, O2 99%  On questioning today the patient states that yesterday he was mowing his lawn and he did not feel himself    He went inside to rest because he was very tired and worn out    He does not remember anything after that  He does not remember falling  He does not fully understand what happened to him, but he does understand that he had a stroke because he was told yesterday  Stroke alert was called at 7:31 p m  Last evening  Documented NIHSS 13 due to left neglect, right-sided gaze preference, left facial asymmetry and left hemiparesis  He also has dysarthria on exam today  Documented last known normal was Monday 7/15/2019 as patient drove to his daughter's house that day  Allegedly his daughter called him but could not get in touch with him and then eventually went to his house and found him lying on the floor with AMS  The patient last saw the PCP 12/18/2018 for insomnia  He was not noted to be on aspirin or statin at that time  He was not noted to have any other health issues at that time  He cannot give me an extremely detailed history because he is very fatigued and also because there is severe dysarthria and he is difficult to understand  However on appearance he does have a significant right-sided gaze and florid left hemiparesis  Yesterday CT head without contrast revealed Acute to early subacute right MCA territory infarct involving greater than one third of the vascular territory  No evidence of hemorrhagic conversion or significant mass effect  Hyperdense right M1 and M2 branches suggesting acute thrombus      CTA head and neck with and without contrast yesterday reveals 70-90% stenosis at the origin of the right ICA with the remainder of the vessel being diminutive likely 2/2 proximal stenosis, and occlusion of the right MCA origin with very minimal basis occasion of the distal MCA branch suggesting poor collateral flow  MRI brain is pending  Thoracic echocardiogram with bubble study is pending  CK notably 6896 today and yesterday 5381  CK-MB today 10 8  Hemoglobin A1c pending  Cholesterol 204, triglycerides 83, HDL 47,   He has minimal leukocytosis and cultures are pending, and creatinine slightly elevated 1 42  Alves Spruce TSH WNL  Past Medical History:   Diagnosis Date    Malignant neoplasm of descending colon ([facility] [address] ) 05/12/2006    Neuralgia     Last Assessed:6/25/13       No past surgical history on file  Allergies   Allergen Reactions    Hydrocodone          Current Facility-Administered Medications:     aspirin rectal suppository 300 mg, 300 mg, Rectal, Daily, Ulus Jessie, FIGUEROANP, 300 mg at 07/17/19 2115    atorvastatin (LIPITOR) tablet 40 mg, 40 mg, Oral, QPM, UlWYATT Joyce    heparin (porcine) subcutaneous injection 5,000 Units, 5,000 Units, Subcutaneous, Q8H [address], 5,000 Units at 07/18/19 0508 **AND** [CANCELED] Platelet count, , , Once, UlWYATT Joyce    Labetalol HCl (NORMODYNE) injection 10 mg, 10 mg, Intravenous, Q6H PRN, WYATT Escalona    pneumococcal 13-valent conjugate vaccine (PREVNAR-13) IM injection 0 5 mL, 0 5 mL, Intramuscular, Prior to discharge, Cody Alexander MD    sodium chloride 0 9 % infusion, 100 mL/hr, Intravenous, Continuous, WYATT Escalona, Last Rate: 100 mL/hr at 07/18/19 0514, 100 mL/hr at 07/18/19 0514    Social History     Socioeconomic History    Marital status:       Spouse name: Not on file    Number of children: Not on file    Years of education: Not on file    Highest education level: Not on file   Occupational History    Not on file   Social Needs    Financial resource strain: Not on file    Food insecurity:     Worry: Not on file     Inability: Not on file    Transportation needs:     Medical: Not on file     Non-medical: Not on file   Tobacco Use    Smoking status: Former Smoker    Smokeless tobacco: Never Used   Substance and Sexual Activity    Alcohol use: Not on file    Drug use: No    Sexual activity: Not on file   Lifestyle    Physical activity:     Days per week: Not on file     Minutes per session: Not on file    Stress: Not on file   Relationships    Social connections:     Talks on phone: Not on file     Gets together: Not on file     Attends Worship service: Not on file     Active member of club or organization: Not on file     Attends meetings of clubs or organizations: Not on file     Relationship status: Not on file    Intimate partner violence:     Fear of current or ex partner: Not on file     Emotionally abused: Not on file     Physically abused: Not on file     Forced sexual activity: Not on file   Other Topics Concern    Not on file   Social History Narrative    Not on file       No family history on file  Review of systems:  Please see HPI for positive symptoms  No fever, no chills, no weight change  Ocular: No drainage, no blurred vision  HEENT:  No sore throat, earache, or congestion  No neck pain  COR:  No chest pain  No palpitations  Lungs:   positive wheezing, no sob,  GI:  no  nausea, no vomiting, no diarrhea, no constipation, no anorexia  :  No dysuria, frequency, or urgency  No hematuria  Musculoskeletal:  No joint pain or swelling or edema  Skin:  No rash or itching  Psychiatric:  no anxiety, no depression  Endocrine:  No polyuria or polydipsia  Vitals:    07/18/19 0500 07/18/19 0600 07/18/19 0700 07/18/19 0800   BP: 120/65 107/55 122/58    Pulse: 65 65 (!) 50    Resp: (!) 28 (!) 31 (!) 26    Temp:    (!) 96 °F (35 6 °C)   TempSrc:    Tympanic   SpO2: 95% 95% 93%    Weight:  81 kg (178 lb 9 2 oz)     Height:           GENERAL APPEARANCE:  The patient is alert, oriented  He is in no acute distress  HEENT:  Head is normocephalic  The sinuses are otherwise nontender  Pupils are reactive bilaterally, slightly less reactive on the right  Left pupil 4-5 mm, right pupil 3 mm  NECK:  Supple without lymphadenopathy  HEART:  Regular rate and rhythm  LUNGS:  clear to auscultation  Mild wheezes, no crackles or rales  ABDOMEN:  Soft, nontender, nondistended with good bowel sounds heard  EXTREMITIES:  Without cyanosis, clubbing or edema  Mental status: The patient is alert, attentive, and oriented  Speech is severely dysarthric which limits the history taking, however his speech is fluent  There is some stuttering and mild word-finding but no global aphasia  Repetition is good besides the dysarthria, he has good comprehension and naming  He repeats 3/3 objects, but recalls 0/3 in 5 minutes  He spells the word world backwards accurately  He is oriented to person, place, time and situation:  He can tell me the day, month and year  He can name the current president  Cranial nerves:  CN II: Visual fields are full to confrontation  Pupils as above  CN III, IV, VI:  He has gaze deviation to the right  When asked he cannot move his eyes to the left and he cannot move his head to the left  EOMs are otherwise normal upward and downward  CN V: Facial sensation is intact to pinprick in all 3 divisions bilaterally  Corneal responses are intact  CN VII:  Facial droop on the left and he has difficulty forming a seal with his lips  CN VIII: Hearing is decreased bilaterally to rubbing fingers  CN IX, X: Palate elevates symmetrically  Phonation is normal   CN XI: Head turning and shoulder shrug are intact  CN XII: Tongue is slightly deviated to the right, with otherwise normal movements and no atrophy  Motor:  Pronator drift cannot be tested due to weakness in the left upper extremity  There is floor and weakness in the left upper and lower extremities  When asked he cannot lift up his arm against gravity  There may be slight 2/5 strength with left hand       Muscle exam  Arm Right Left Leg Right Left   Deltoid 4/5 1/5 Iliopsoas 4/5 1/5   Biceps 5/5 1/5 Quads 4/5 1/5   Triceps 5/5 1/5 Hamstrings 4/5 1/5   Wrist Extension 5/5 1/5 Ankle Dorsi Flexion 5/5 1/5   Wrist Flexion 5/5 1/5 Ankle Plantar Flexion 5/5 1/5   Interossei 5/5 2/5 Ankle Eversion 5/5 1/5   APB 5/5 2/5 Ankle Inversion 5/5 1/5       Reflexes   RJ BJ TJ KJ AJ Plantars Graves's   Right 2+ 2+ 2+ 2+ 0 mute Not present   Left 2+ 2+ 2+ 2+ 0 mute Not present     Sensory:  He cannot sense light touch in the LUE starting at the deltoid  He can sense light touch in the neck and clavicular region  He cannot sense pinprick in these regions either  Sensation is full to light touch in the LLE, and diminished to pinprick in the entire LLE  Sensation is full to light touch, pinprick and vibration in the right upper and lower extremities  Coordination:  No tremor  Romberg deferred  Gait/Stance:  Deferred  Lab Results   Component Value Date    WBC 14 71 (H) 07/18/2019    HGB 11 3 (L) 07/18/2019    HCT 35 3 (L) 07/18/2019    MCV 95 07/18/2019     07/18/2019     No results found for: HGBA1C  Lab Results   Component Value Date    ALT 20 07/17/2019    AST 78 (H) 07/17/2019    ALKPHOS 92 07/17/2019     Lab Results   Component Value Date    CALCIUM 7 9 (L) 07/18/2019    K 3 8 07/18/2019    CO2 25 07/18/2019     07/18/2019    BUN 19 07/18/2019    CREATININE 1 42 (H) 07/18/2019           Radiology          Review of reports and notes reveal:    Independent Interpretation of images or specimens:  Xr Chest 1 View Portable    Result Date: 7/18/2019  No acute cardiopulmonary disease  Workstation performed: [id number]     Ct Stroke Alert Brain    Result Date: 7/17/2019  1  Acute to early subacute right MCA territory infarct involving greater than one third of the vascular territory  No evidence of hemorrhagic conversion or significant mass effect  2   Hyperdense right M1 and M2 branches suggesting acute thrombus  Findings were directly discussed with Nam Butt on 7/17/2019 7:16 PM  Workstation performed: [id number]     Cta Stroke Alert (head/neck)    Result Date: 7/17/2019  1  Severe (70-90%) stenosis at the origin of the right internal carotid artery  Remainder of the vessel is diminutive likely secondary to proximal stenosis   2  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  Very minimal opacification of distal M3 branches suggesting poor collateral flow  I personally discussed this study with Sasha Amaro on 7/17/2019 at 7:14 PM  Workstation performed: [id number]             Thank you for this consult  Total time of encounter: 70 minutes  More than 50% of time was spent in counseling and coordination of care of patient  ELENA Martin    [facility]  [id number]  [place]bert Lanza

## 2019-07-18 NOTE — ASSESSMENT & PLAN NOTE
· Patient brought in by EMS after being found with altered mental status by his step-daughter, POA  · Patient was last seen normal on Monday, patient was then found down on the bathroom floor with left-sided weakness and altered mentation 7/17,   · Patient was a stroke alert  NIH scale 13 on admission per Dr Neva Cote  Patient displayed right sided gaze preference, left sided weakness, left sided facial droop  Speech is incoherent  · CT head, CTA head neck revealed "Severe (70-90%) stenosis at the origin of the right internal carotid artery  Remainder of the vessel is diminutive likely secondary to proximal stenosis  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  Very minimal opacification of distal M3 branches suggesting poor collateral flow"  · Endovascular Neurology was contacted by Dr Neva Cote who declined intervention given age and unknown onset   · Dr Xavi Castillo with Neurology was contacted, patient not a tPA candidate  Recommended 300mg rectal ASA and stroke pathway with close neurologic monitoring overnight given large infarct with possibility of swelling as timing of infarct is unknown  · Allow permissive hypertension with SBP goal 160-200  · Hold sedating medications  · CT scan PRN with neurologic changes  · HA1c/lipid panel pending  · Lipitor 40 mg p o   Daily  · NPO pending speech eval  · MRI pending  · Echocardiogram with bubble study pending

## 2019-07-18 NOTE — ED PROVIDER NOTES
History  Chief Complaint   Patient presents with    STROKE Alert     Pt found down on bathroom floor, unkown down, unkown normal   Vomitted prior to arrival      78-year-old male with past medical history of colon cancer, neuralgia, presents to the ER for evaluation for change in mental status  Patient lives alone  Patient was last seen by his daughter 2 days ago on Monday  Earlier today daughter called to check on patient however there was no response  His few hours later daughter went to patient's house to check on him and found patient laying on the bathroom floor with vomit around him  Daughter subsequently called EMS and patient brought to the ER for further evaluation  Upon arrival to the ER patient found to have right-sided gaze with left-sided facial droop and left upper and lower extremity weakness  Stroke alert was not activated as last seen normal was over 2 days ago  However patient was sent directly to CT for CT head and CTA head/neck for stroke evaluation  History provided by:  Patient  STROKE Alert       Prior to Admission Medications   Prescriptions Last Dose Informant Patient Reported? Taking? QUEtiapine (SEROquel) 100 mg tablet Past Week at Unknown time  No Yes   Sig: Take 1 tablet (100 mg total) by mouth daily at bedtime   mirtazapine (REMERON) 30 mg tablet Past Week at Unknown time  No Yes   Sig: Take 1 tablet (30 mg total) by mouth daily at bedtime      Facility-Administered Medications: None       Past Medical History:   Diagnosis Date    Malignant neoplasm of descending colon ([facility] [address] ) 05/12/2006    Neuralgia     Last Assessed:6/25/13       No past surgical history on file  No family history on file  I have reviewed and agree with the history as documented      Social History     Tobacco Use    Smoking status: Former Smoker    Smokeless tobacco: Never Used   Substance Use Topics    Alcohol use: Not on file    Drug use: No        Review of Systems   Unable to perform ROS: Mental status change       Physical Exam  Physical Exam   Constitutional: He is oriented to person, place, and time  He appears well-developed and well-nourished  HENT:   Head: Normocephalic and atraumatic  Nose: Nose normal    Mouth/Throat: Oropharynx is clear and moist    Eyes: Conjunctivae and EOM are normal    Neck: Normal range of motion  Neck supple  Cardiovascular: Normal rate, regular rhythm and normal heart sounds  Patient hypertensive on the monitor  Pulmonary/Chest: Effort normal and breath sounds normal    Abdominal: Soft  Bowel sounds are normal  He exhibits no distension  There is no tenderness  Musculoskeletal: Normal range of motion  Neurological: He is alert and oriented to person, place, and time  Patient has right-sided gaze with left-sided facial droop  Patient has slurred speech that is not discernible  Patient has right upper and lower extremity weakness  Patient also has some mild neglect to the right side  Full NIH stroke score cannot be evaluated due to patient's mental status and symptoms, however a score of 13 was obtained on limited exam     Skin: Skin is warm  Psychiatric: He has a normal mood and affect  His behavior is normal  Judgment and thought content normal    Nursing note and vitals reviewed        Vital Signs  ED Triage Vitals   Temperature Pulse Respirations Blood Pressure SpO2   07/17/19 2108 07/17/19 1841 07/17/19 1900 07/17/19 1841 07/17/19 1841   98 5 °F (36 9 °C) 70 (!) 26 (!) 179/106 99 %      Temp Source Heart Rate Source Patient Position - Orthostatic VS BP Location FiO2 (%)   07/17/19 2108 -- 07/17/19 1933 07/17/19 1933 --   Tympanic  Lying Left arm       Pain Score       --                  Vitals:    07/17/19 2030 07/17/19 2045 07/17/19 2100 07/17/19 2115   BP: (!) 180/87 (!) 176/81 (!) 188/90    Pulse: 78 70 78 62   Patient Position - Orthostatic VS:             Visual Acuity      ED Medications  Medications   sodium chloride 0 9 % infusion (150 mL/hr Intravenous New Bag 7/17/19 2045)   atorvastatin (LIPITOR) tablet 40 mg (has no administration in time range)   aspirin rectal suppository 300 mg (300 mg Rectal Given 7/17/19 2115)   heparin (porcine) subcutaneous injection 5,000 Units (has no administration in time range)   sodium chloride 0 9 % bolus 1,000 mL (0 mL Intravenous Stopped 7/17/19 2045)   Labetalol HCl (NORMODYNE) injection 10 mg (10 mg Intravenous Given 7/17/19 1904)   iohexol (OMNIPAQUE) 350 MG/ML injection (MULTI-DOSE) 85 mL (85 mL Intravenous Given 7/17/19 1903)       Diagnostic Studies  Results Reviewed     Procedure Component Value Units Date/Time    CKMB [[id number]]  (Abnormal) Collected:  07/17/19 1905    Lab Status:  Final result Specimen:  Blood from Arm, Right Updated:  07/17/19 2134     CK-MB Index <1 0 %      CK-MB 12 2 ng/mL     Blood culture #2 [[id number]] Collected:  07/17/19 2100    Lab Status: In process Specimen:  Blood from Arm, Right Updated:  07/17/19 2129    Blood culture #1 [[id number]] Collected:  07/17/19 2100    Lab Status: In process Specimen:  Blood from Arm, Left Updated:  07/17/19 2128    CK (with reflex to MB) [[id number]]  (Abnormal) Collected:  07/17/19 1905    Lab Status:  Final result Specimen:  Blood from Arm, Right Updated:  07/17/19 2106     Total CK 5,381 U/L     Urinalysis with reflex to microscopic [[id number]]     Lab Status:  No result Specimen:  Urine     Platelet count [[id number]]     Lab Status:  No result Specimen:  Blood     Lactic acid, plasma [[id number]]  (Abnormal) Collected:  07/17/19 1905    Lab Status:  Final result Specimen:  Blood from Arm, Right Updated:  07/17/19 1945     LACTIC ACID 3 3 mmol/L     Narrative:       Result may be elevated if tourniquet was used during collection      Mary Espinosa [[id number]]  (Normal) Collected:  07/17/19 1905    Lab Status:  Final result Specimen:  Blood from Arm, Right Updated:  07/17/19 1943     Protime 11 0 seconds      INR 1 05    APTT [[id number]] (Normal) Collected:  07/17/19 1905    Lab Status:  Final result Specimen:  Blood from Arm, Right Updated:  07/17/19 1943     PTT 25 seconds     TSH, 3rd generation with Free T4 reflex [[id number]]  (Normal) Collected:  07/17/19 1905    Lab Status:  Final result Specimen:  Blood from Arm, Right Updated:  07/17/19 1942     TSH 3RD GENERATON 2 007 uIU/mL     Narrative:       Patients undergoing fluorescein dye angiography may retain small amounts of fluorescein in the body for 48-72 hours post procedure  Samples containing fluorescein can produce falsely depressed TSH values  If the patient had this procedure,a specimen should be resubmitted post fluorescein clearance        Lipase [[id number]]  (Abnormal) Collected:  07/17/19 1905    Lab Status:  Final result Specimen:  Blood from Arm, Right Updated:  07/17/19 1942     Lipase 68 u/L     Magnesium [[id number]]  (Normal) Collected:  07/17/19 1905    Lab Status:  Final result Specimen:  Blood from Arm, Right Updated:  07/17/19 1942     Magnesium 2 0 mg/dL     Phosphorus [[id number]]  (Normal) Collected:  07/17/19 1905    Lab Status:  Final result Specimen:  Blood from Arm, Right Updated:  07/17/19 1942     Phosphorus 3 5 mg/dL     B-type natriuretic peptide [[id number]]  (Abnormal) Collected:  07/17/19 1905    Lab Status:  Final result Specimen:  Blood from Arm, Right Updated:  07/17/19 1942     NT-proBNP 644 pg/mL     Comprehensive metabolic panel [[id number]]  (Abnormal) Collected:  07/17/19 1905    Lab Status:  Final result Specimen:  Blood from Arm, Right Updated:  07/17/19 1934     Sodium 138 mmol/L      Potassium 4 7 mmol/L      Chloride 104 mmol/L      CO2 24 mmol/L      ANION GAP 10 mmol/L      BUN 17 mg/dL      Creatinine 1 55 mg/dL      Glucose 132 mg/dL      Calcium 8 0 mg/dL      AST 78 U/L      ALT 20 U/L      Alkaline Phosphatase 92 U/L      Total Protein 6 7 g/dL      Albumin 3 0 g/dL      Total Bilirubin 0 40 mg/dL      eGFR 41 ml/min/1 73sq m     Narrative: National Kidney Disease Foundation guidelines for Chronic Kidney Disease (CKD):     Stage 1 with normal or high GFR (GFR > 90 mL/min/1 73 square meters)    Stage 2 Mild CKD (GFR = 60-89 mL/min/1 73 square meters)    Stage 3A Moderate CKD (GFR = 45-59 mL/min/1 73 square meters)    Stage 3B Moderate CKD (GFR = 30-44 mL/min/1 73 square meters)    Stage 4 Severe CKD (GFR = 15-29 mL/min/1 73 square meters)    Stage 5 End Stage CKD (GFR <15 mL/min/1 73 square meters)  Note: GFR calculation is accurate only with a steady state creatinine    CBC and differential [[id number]]  (Abnormal) Collected:  07/17/19 1905    Lab Status:  Final result Specimen:  Blood from Arm, Right Updated:  07/17/19 1916     WBC 16 18 Thousand/uL      RBC 4 02 Million/uL      Hemoglobin 12 3 g/dL      Hematocrit 38 3 %      MCV 95 fL      MCH 30 6 pg      MCHC 32 1 g/dL      RDW 16 1 %      MPV 9 5 fL      Platelets 766 Thousands/uL      nRBC 0 /100 WBCs      Neutrophils Relative 88 %      Immat GRANS % 1 %      Lymphocytes Relative 4 %      Monocytes Relative 7 %      Eosinophils Relative 0 %      Basophils Relative 0 %      Neutrophils Absolute 14 35 Thousands/µL      Immature Grans Absolute 0 11 Thousand/uL      Lymphocytes Absolute 0 63 Thousands/µL      Monocytes Absolute 1 06 Thousand/µL      Eosinophils Absolute 0 00 Thousand/µL      Basophils Absolute 0 03 Thousands/µL     UA w Reflex to Microscopic w Reflex to Culture [[id number]]     Lab Status:  No result Specimen:  Urine                  CTA stroke alert (head/neck)   Final Result by Kvng Man MD (07/17 1922)         1  Severe (70-90%) stenosis at the origin of the right internal carotid artery  Remainder of the vessel is diminutive likely secondary to proximal stenosis  2   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  Very minimal opacification of distal M3 branches suggesting poor collateral flow        I personally discussed this study with Debra Dipak on 7/17/2019 at 7:14 PM                            Workstation performed: [id number]         CT stroke alert brain   Final Result by Jitendra Collado MD (07/17 1922)         1  Acute to early subacute right MCA territory infarct involving greater than one third of the vascular territory  No evidence of hemorrhagic conversion or significant mass effect  2   Hyperdense right M1 and M2 branches suggesting acute thrombus  Findings were directly discussed with Debra Quesada on 7/17/2019 7:16 PM       Workstation performed: [id number]         XR chest 1 view portable    (Results Pending)   MRI Inpatient Order    (Results Pending)              Procedures  ECG 12 Lead Documentation Only  Date/Time: 7/17/2019 6:58 PM  Performed by: Ho Jackson DO  Authorized by: Ho Jackson DO     Indications / Diagnosis:  Stroke  ECG reviewed by me, the ED Provider: yes    Patient location:  ED  Previous ECG:     Previous ECG:  Unavailable    Comparison to cardiac monitor: Yes    Interpretation:     Interpretation: abnormal    Comments:      Sinus rhythm, rate 79, normal axis, normal intervals, no acute ST elevations noted, lot of interference noted secondary to tachypnea, no previous EKG available for comparison    CriticalCare Time  Performed by: Ho Jackson DO  Authorized by: Ho Jackson DO     Critical care provider statement:     Critical care time (minutes):  125    Critical care was necessary to treat or prevent imminent or life-threatening deterioration of the following conditions:  CNS failure or compromise    Critical care was time spent personally by me on the following activities:  Blood draw for specimens, obtaining history from patient or surrogate, development of treatment plan with patient or surrogate, discussions with consultants, discussions with primary provider, evaluation of patient's response to treatment, review of old charts, examination of patient, re-evaluation of patient's condition, ordering and review of radiographic studies, ordering and review of laboratory studies, ordering and performing treatments and interventions and interpretation of cardiac output measurements  Comments:      Stroke           ED Course  ED Course as of Jul 17 2206 Wed Jul 17, 2019   [address] [facility] Patient arrived to the ED with unknown down time  Patient was last seen by daughter 2 days ago, on Monday  Therefore stroke alert was not activated however patient sent directly to CT to have CT brain and CTA head/neck for stroke workup  Susu Ruff with patient's daughter who is the medical power of   At this point patient's status is DNR  8332 Case discussed with radiologist who notes acute to subacute left MCA infarct      1927 Case discussed with neurosurgeon, Dr Yadira Paul, who will discuss the case with endovascular and endovascular surgeon will call back  1933 Case discussed with Kelly Sidhu who notes that patient is not a neurosurgical candidate based on CT finding as well as H  At this point he refers case to Neurology for further management  1940 Case discussed with neurologist, Dr Luca Aviles who recommends stepdown admission for further monitoring, 200mg rectal aspirin, keep SBP between 160-200  Treat htn with bolus medication  1948 Case discussed with ICU AP who will evaluate patient  2015 ICU accepts patient to step-down admission  2034 Updated results with patient's daughter, who agrees with admission plans                  Stroke Assessment     Row Name 07/17/19 1906             NIH Stroke Scale    Interval  Baseline      Level of Consciousness (1a )  0      LOC Questions (1b )  1      LOC Commands (1c )  0      Best Gaze (2 )  1      Visual (3 )  0 Unable to assess      Facial Palsy (4 )  2      Motor Arm, Left (5a )  2      Motor Arm, Right (5b )  0      Motor Leg, Left (6a )  2      Motor Leg, Right (6b )  0      Limb Ataxia (7 )  0 Cannot assess      Sensory (8 )  0 Unable to assess      Best Language (9 )  2      Dysarthria (10 )  2      Extinction and Inattention (11 ) (Formerly Neglect)  1      Total  13                          MDM  Number of Diagnoses or Management Options  [facility] new and requires workup  High blood pressure: new and requires workup  Diagnosis management comments: Obtain stat CT head, CTA head/neck  Obtain blood work, EKG, chest x-ray  Give labetalol and reassess blood pressure  Amount and/or Complexity of Data Reviewed  Clinical lab tests: ordered and reviewed  Tests in the radiology section of CPT®: ordered and reviewed  Tests in the medicine section of CPT®: ordered and reviewed  Review and summarize past medical records: yes  Independent visualization of images, tracings, or specimens: yes    Risk of Complications, Morbidity, and/or Mortality  General comments: Patient with unknown down time which change in mental status presented to the ER for evaluation of stroke  Last known normal was 2 days ago  As a result stroke alert was not activated however patient immediately went to CT scan for CT head and CT head/neck  CT scan shows acute to subacute right MCA infarct with hyperdense right M1 and M2 branches suggesting acute thrombosis  Case discussed with endovascular surgeon, Dr Chilango Moses, who reviewed history and CT findings and at this time patient is not a candidate for any neuro surgical or endovascular intervention due to patient's age and duration of symptoms  Case discussed with neurologist who agrees that patient is not a tPA candidate  At this time it is recommended to give rectal aspirin and admit patient to step-down for further neurological monitoring and stroke workup  Patient and family agrees with admission plans      Patient Progress  Patient progress: stable      Disposition  Final diagnoses:   CVA (cerebral vascular accident) ([facility] )   High blood pressure     Time reflects when diagnosis was documented in both MDM as applicable and the Disposition within this note     Time User Action Codes Description Comment    7/17/2019  8:16 PM Farzana Flores Add [I63 9] CVA (cerebral vascular accident) ([facility] [address] )     7/17/2019  8:16 PM Farzana Flores Add [I10] High blood pressure       ED Disposition     ED Disposition Condition Date/Time Comment    Admit Stable Wed Jul 17, 2019  8:16 PM Case was discussed with ICU AP and the patient's admission status was agreed to be Admission Status: inpatient status to the service of Dr Ayo Verde  Follow-up Information    None         Current Discharge Medication List      CONTINUE these medications which have NOT CHANGED    Details   mirtazapine (REMERON) 30 mg tablet Take 1 tablet (30 mg total) by mouth daily at bedtime  Qty: 30 tablet, Refills: 6    Associated Diagnoses: Primary insomnia      QUEtiapine (SEROquel) 100 mg tablet Take 1 tablet (100 mg total) by mouth daily at bedtime  Qty: 30 tablet, Refills: 6    Associated Diagnoses: Primary insomnia           No discharge procedures on file      ED Provider  Electronically Signed by           James Jacobson DO  07/17/19 0047

## 2019-07-18 NOTE — CONSULTS
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, MRI brain: Large acute right MCA territory infarction   Right Carotid Stenosis: CTA head and neck: 70-90% stenosis of Right internal carotid artery     Plan:  Statin therapy  Continue anticoagulation Plavix/heparin  Serial Neuro Exams  Plan for out patient follow up possible Carotid endarectomy after resolution/rehab current Stroke    History of Present Illness   Physician Requesting Consult: Bebeto Blas MD  Reason for Consult / Principal Problem: Right ICA stenosis  HPI: Sharon King is a 81y o  year old male past medical history of Colon cancer, Insomnia, 60 year pack hx  Patient quite smoking x 2 years ago  Patient presented to ED after his daughter found him down on the bathroom floor on 07/17/2019  Unsure how long patient was down on the floor  Patient last known normal was on 07/15/2019 when he drove to his daughter  Patient reports he was mowing the lawn and then does not remember anything more  Patient reports he does not remember falling or hitting his head  Patient complaining of left sided weakness  Stroke alert was called on 07/17/2019 at 7:31 pm due to left sided facial asymmetry and left hemiparesis  CTA head and neck: 70-90% stenosis of Right internal carotid artery  MRI brain: Large acute right MCA territory infarction       Inpatient consult to Vascular Surgery  Consult performed by: Meir Cruz PA-C  Consult ordered by: WYATT Green          Review of Systems   Constitutional: Positive for fatigue  Respiratory: Positive for wheezing  Negative for chest tightness and shortness of breath  Cardiovascular: Negative for chest pain, palpitations and leg swelling  Skin: Positive for wound     Neurological: Positive for facial asymmetry, speech difficulty and weakness  Historical Information   Past Medical History:   Diagnosis Date    Malignant neoplasm of descending colon ([facility] [address] ) 05/12/2006    Neuralgia     Last Assessed:6/25/13     No past surgical history on file  Social History   Social History     Substance and Sexual Activity   Alcohol Use Not on file     Social History     Substance and Sexual Activity   Drug Use No     Social History     Tobacco Use   Smoking Status Former Smoker   Smokeless Tobacco Never Used     Family History: non-contributory}    Meds/Allergies   all current active meds have been reviewed  Allergies   Allergen Reactions    Hydrocodone        Objective   Vitals: Blood pressure 145/72, pulse 61, temperature 97 6 °F (36 4 °C), temperature source Temporal, resp  rate (!) 33, height 5' 6" (1 676 m), weight 81 kg (178 lb 9 2 oz), SpO2 96 %  ,Body mass index is 28 82 kg/m²  Intake/Output Summary (Last 24 hours) at 7/18/2019 1607  Last data filed at 7/18/2019 1129  Gross per 24 hour   Intake 2028 33 ml   Output 1400 ml   Net 628 33 ml     Invasive Devices     Peripheral Intravenous Line            Peripheral IV 07/17/19 Left Hand less than 1 day    Peripheral IV 07/17/19 Right Antecubital less than 1 day          Drain            Urethral Catheter Non-latex 16 Fr  less than 1 day                Physical Exam   Constitutional: No distress  HENT:   Head: Normocephalic  Eyes: Right eye exhibits no discharge  Left eye exhibits no discharge  Right conjunctiva is not injected  Left conjunctiva is not injected  No scleral icterus  Right side gaze  Pupils reactive b/l, less reactive on right  Cardiovascular: Normal rate, regular rhythm and normal heart sounds  Pulses:       Dorsalis pedis pulses are 2+ on the right side, and 2+ on the left side  Posterior tibial pulses are 2+ on the right side, and 2+ on the left side  Pulmonary/Chest: No stridor  No respiratory distress  He has wheezes     Musculoskeletal: Right foot: There is normal range of motion  Left foot: There is decreased range of motion  Neurological: He is alert  Romberg deferred  Left side facial droop  Decreased strength on left side  Patient is able to move toes   strength Left: 0  Right +5  Unable to due pronator drift due to left side weakness  Tongue slight deviated to right  Patient is unable to move head to the left  Skin: He is not diaphoretic  Lab Results: I have personally reviewed pertinent reports  Imaging Studies: I have personally reviewed pertinent reports  EKG, Pathology, and Other Studies: I have personally reviewed pertinent reports  VTE Prophylaxis: Heparin     Code Status: Level 3 - DNAR and DNI  Advance Directive and Living Will:      Power of :    POLST:      Counseling / Coordination of Care  Counseling/Coordination of Care: Total floor / unit time spent today 30 minutes  Greater than 50% of total time was spent with the patient and / or family counseling and / or coordination of care  A description of the counseling / coordination of care:  obtaining history, performing physical exam, reviewing labs and imaging, discussing case with attending

## 2019-07-18 NOTE — ASSESSMENT & PLAN NOTE
Bladder scan obtained which returned 800 cc patient's bladder  In consideration for patient is ELOISE and rhabdomyolysis will place Cohen catheter for accurate I&Os

## 2019-07-18 NOTE — ASSESSMENT & PLAN NOTE
· Likely secondary to acute CVA  · Patient formal speech and swallow evaluation  Patient had signs and symptoms of significant pharyngeal dysphagia with tsp of thick liquid and is considered high risk for aspiration at this time    · Continue NPO

## 2019-07-18 NOTE — OCCUPATIONAL THERAPY NOTE
OT EVALUATION       07/18/19 1200   Note Type   Note type Eval only   Restrictions/Precautions   Other Precautions Chair Alarm; Bed Alarm; Fall Risk;Cognitive  (left neglect, left hemiparesis)   Pain Assessment   Pain Assessment No/denies pain   Home Living   Type of [address] One level  (couple steps to enter )   Home Equipment   (none)   Additional Comments pt mows the lawn    Prior Function   Level of [place] Independent with ADLs and functional mobility   Lives With Alone   ADL Assistance Independent   IADLs Independent   Falls in the last 6 months 1 to 4   Comments pt found on floor by family at home    ADL   Eating Assistance 2  Maximal Assistance   Grooming Assistance 1  Total [address] 1  Total Assistance   LB [address] 1  Total [address] 1  Total Assistance   LB Dressing Assistance 1  Total [address]  1  Total Assistance   Bed Mobility   Additional Comments mobility not assessed due to patients weakness and pain in LEs with ROM    Activity Tolerance   Activity Tolerance Patient limited by fatigue;Treatment limited secondary to medical complications (Comment)  (weakness, in ICU)   Nurse Made Aware yes   RUE Overall AROM   R Shoulder Flexion 60 degrees shoulder flexion, 3-/5 MMT   R Elbow Flexion WFL MMT 3+/5   R Mass Grasp WFL MMT 3+/5   LUE Assessment   LUE Assessment   (PROM WFL, MMT 0/5)   Hand Function   Gross Motor Coordination Impaired  (LUE/LE)   Fine Motor Coordination Impaired  (LUE)   Vision - Complex Assessment   Additional Comments pt with neglect on L and L facial droop   Cognition   Overall Cognitive Status Impaired   Arousal/Participation Lethargic   Attention Attends with cues to redirect   Orientation Level Oriented to person;Oriented to place   Following Commands Follows one step commands with increased time or repetition   Comments pt with eyes closed most of session, able to answer some questions, awoke for OT evaluation   Assessment   Limitation Decreased ADL status; Decreased UE strength;Decreased Safe judgement during ADL;Decreased endurance;Decreased self-care trans;Decreased high-level ADLs; Decreased cognition;Decreased UE ROM; Decreased fine motor control;Non-func L UE;Visual deficit  (decreased balance and mobility )   Prognosis Fair   Assessment Patient evaluated by Occupational Therapy  Patient admitted with Cerebrovascular accident (CVA) due to thrombosis of right middle cerebral artery ([facility] [address] )  The patients occupational profile, medical and therapy history includes a extensive additional review of physical, cognitive, or psychosocial history related to current functional performance  Comorbidities affecting functional mobility and ADLS include: colon cancer, insomnia  Prior to admission, patient was independent with functional mobility without assistive device, independent with ADLS and independent with IADLS  The evaluation identifies the following performance deficits: weakness, decreased ROM, impaired balance, decreased endurance, decreased coordination, increased fall risk, new onset of impairment of functional mobility, decreased ADLS, decreased IADLS, decreased activity tolerance, decreased safety awareness, impaired judgement, decreased cognition, decreased strength and visual deficits, that result in activity limitations and/or participation restrictions  This evaluation requires clinical decision making of high complexity, because the patient presents with comorbidites that affect occupational performance and required significant modification of tasks or assistance with consideration of multiple treatment options  The Barthel Index was used as a functional outcome tool presenting with a score of 0, indicating marked limitations of functional mobility and ADLS    Patient will benefit from skilled Occupational Therapy services to address above deficits and facilitate a safe return to prior level of function  Goals   Patient Goals none stated, pt lethargic    STG Time Frame   (1-7 days)   Short Term Goal  Patient will increase standing tolerance to 1 minutes during ADL task to decrease assistance level and decrease fall risk; Patient will increase bed mobility to max assist of 2 in preparation for ADLS and transfers; Patient will tolerate 10 minutes of UE ROM/strengthening to increase general activity tolerance and performance in ADLS/IADLS; Patient will improve functional activity tolerance to 10 minutes of sustained functional tasks to increase participation in basic self-care and decrease assistance level;  Patient will increase dynamic sitting balance to poor+ to improve the ability to sit at edge of bed or on a chair for ADLS;  Patient will increase dynamic standing balance to poor to improve postural stability and decrease fall risk during standing ADLS and transfers  LTG Time Frame   (8-14 days)   Long Term Goal Patient will increase standing tolerance to 2 minutes during ADL task to decrease assistance level and decrease fall risk; Patient will increase bed mobility to mod assist of 2 in preparation for ADLS and transfers; Patient will tolerate 20 minutes of UE ROM/strengthening to increase general activity tolerance and performance in ADLS/IADLS; Patient will improve functional activity tolerance to 20 minutes of sustained functional tasks to increase participation in basic self-care and decrease assistance level;  Patient will increase dynamic sitting balance to fair- to improve the ability to sit at edge of bed or on a chair for ADLS;  Patient will increase dynamic standing balance to poor+ to improve postural stability and decrease fall risk during standing ADLS and transfers  Patient will orient self x 4 with minimal verbal cues  Patient will demonstrate compensatory techniques for low vision with minimal verbal cues     Functional Transfer Goals   Pt Will Perform All Functional Transfers   (STG max assist of 2 LTG mod assist of 2)   ADL Goals   Pt Will Perform All ADL's   (STG mod assist LTG Min assist )   Plan   Treatment Interventions ADL retraining;Visual perceptual retraining;Functional transfer training;UE strengthening/ROM; Endurance training;Cognitive reorientation;Patient/family training;Equipment evaluation/education; Activityengagement; Fine motor coordination activities; Compensatory technique education;Continued evaluation   Goal Expiration Date 08/01/19   OT Frequency 5x/wk   Recommendation   OT Discharge Recommendation Short Term Rehab   Barthel Index   Feeding 0   Bathing 0   Grooming Score 0   Dressing Score 0   Bladder Score 0   Bowels Score 0   Toilet Use Score 0   Transfers (Bed/Chair) Score 0   Mobility (Level Surface) Score 0   Stairs Score 0   Barthel Index Score 0   Modified Criselda Scale   Modified Sabine Scale 5   Licensure   [place] License Number  Pramodrahul Leonard [address] OTR/L [id number]

## 2019-07-18 NOTE — H&P
History and Physical - Critical Care/ Stepdown   Mariana Mercedes 81 y o  male MRN: [id number]  Unit/Bed#: ICU 12 Encounter: [id number]    Reason for Admission / Chief Complaint:  Right MCA occlusion     History of Present Illness:  Mariana Mercedes is a 81 y o  male who presents via EMS after being found to have left sided weakness and altered mental status  Past medical history includes colon cancer and insomnia  Patient was last seen normal on Monday, >48 hours after presentation  Daughter had been trying to call and check on patient with no answer  Today she went to his house to check on him and found him in the bathroom with altered mental status and left sided weakness  Upon arrival to the ED, a stroke alert was called  CT head and neck revealed occlusion of the right MCA with minimal opacification of the distal M3 branches as well as severe right carotid stenosis  Endovascular neurology was contacted but patient is not a candidate for intervention  Dr Morena Rivera (Neurology) reviewed CT finding and felt this is subacute/late acute CVA that is atheroembolic from right carotid stenosis and M1 occlusion  Patient's daughter (POA) at the bedside, patient DNR/DNI  Will admit to step down unit for close neurologic monitoring  History obtained from child and chart review  Past Medical History:  Past Medical History:   Diagnosis Date    Malignant neoplasm of descending colon ([facility] [address] ) 05/12/2006    Neuralgia     Last Assessed:6/25/13       Past Surgical History:  No past surgical history on file  Past Family History:  No family history on file  Social History:  Social History     Tobacco Use   Smoking Status Former Smoker   Smokeless Tobacco Never Used      Social History     Substance and Sexual Activity   Alcohol Use Not on file     Social History     Substance and Sexual Activity   Drug Use No     Marital Status:      Medications:  Current Facility-Administered Medications   Medication Dose Route Frequency  aspirin rectal suppository 300 mg  300 mg Rectal Daily    atorvastatin (LIPITOR) tablet 40 mg  40 mg Oral QPM    heparin (porcine) subcutaneous injection 5,000 Units  5,000 Units Subcutaneous Q8H [address]    Labetalol HCl (NORMODYNE) injection 10 mg  10 mg Intravenous Q6H PRN    pneumococcal 13-valent conjugate vaccine (PREVNAR-13) IM injection 0 5 mL  0 5 mL Intramuscular Prior to discharge    sodium chloride 0 9 % bolus 500 mL  500 mL Intravenous Once    sodium chloride 0 9 % infusion  100 mL/hr Intravenous Continuous     Home medications:  Prior to Admission medications    Medication Sig Start Date End Date Taking? Authorizing Provider   mirtazapine (REMERON) 30 mg tablet Take 1 tablet (30 mg total) by mouth daily at bedtime 19  Yes Robert Harkins MD   QUEtiapine (SEROquel) 100 mg tablet Take 1 tablet (100 mg total) by mouth daily at bedtime 19  Yes Robert Harkins MD     Allergies: Allergies   Allergen Reactions    Hydrocodone        ROS:   Review of Systems   Unable to perform ROS: Mental status change       Vitals:  Vitals:    19 2150 19 2210 19 2300 19 0000   BP: 129/89 167/77 146/77 141/69   BP Location:       Pulse: 67 63 86 74   Resp: (!) 25 (!) 30 (!) 29 (!) 31   Temp:  97 9 °F (36 6 °C)  98 4 °F (36 9 °C)   TempSrc:  Temporal  Tympanic   SpO2: 98% 99% 93% 98%   Weight:       Height:         Temperature:   Temp (24hrs), Av 3 °F (36 8 °C), Min:97 9 °F (36 6 °C), Max:98 5 °F (36 9 °C)    Current Temperature: 98 4 °F (36 9 °C)    Weights:   IBW: 63 8 kg  Body mass index is 28 61 kg/m²  Non-Invasive/Invasive Ventilation Settings:  Respiratory    Lab Data (Last 4 hours)    None         O2/Vent Data (Last 4 hours)    None              No results found for: PHART, ZJE7HOU, PO2ART, XLH6EJO, F5JBBQGZ, BEART, SOURCE  SpO2: SpO2: 98 %     Physical Exam:  Physical Exam   Constitutional: He appears well-developed and well-nourished  He appears lethargic   No distress  HENT:   Head: Normocephalic and atraumatic  Eyes:   Right sided gaze preference  Patient unable to follow commands to assess visual field deficits  Pupils equal and reactive to light  Neck: Normal range of motion  Neck supple  No JVD present  Cardiovascular: Normal rate, regular rhythm and normal heart sounds  No murmur heard  Pulmonary/Chest: Effort normal  No accessory muscle usage  No respiratory distress  He has decreased breath sounds  He has no rhonchi  Mild abdominal muscle use   Abdominal: Soft  Bowel sounds are normal  He exhibits no distension  There is no tenderness  There is no guarding  Umbilical hernia present   Genitourinary:   Genitourinary Comments: Clear, yellow urine with condom cath in place   Musculoskeletal: He exhibits no edema  Neurological: He appears lethargic  Speech slurred and difficult to understand  Incoherent at times however, was able to state year, month and that he was in the hospital  Unable to assess sensation  Skin: Skin is warm and dry  Capillary refill takes less than 2 seconds  No erythema         Labs:  Results from last 7 days   Lab Units 07/17/19  1905   WBC Thousand/uL 16 18*   HEMOGLOBIN g/dL 12 3   HEMATOCRIT % 38 3   PLATELETS Thousands/uL 213   NEUTROS PCT % 88*   MONOS PCT % 7      Results from last 7 days   Lab Units 07/17/19  1905   SODIUM mmol/L 138   POTASSIUM mmol/L 4 7   CHLORIDE mmol/L 104   CO2 mmol/L 24   BUN mg/dL 17   CREATININE mg/dL 1 55*   CALCIUM mg/dL 8 0*   ALK PHOS U/L 92   ALT U/L 20   AST U/L 78*     Results from last 7 days   Lab Units 07/17/19  1905   MAGNESIUM mg/dL 2 0     Results from last 7 days   Lab Units 07/17/19  1905   PHOSPHORUS mg/dL 3 5      Results from last 7 days   Lab Units 07/17/19  1905   INR  1 05   PTT seconds 25     Results from last 7 days   Lab Units 07/17/19  2219 07/17/19  1905   LACTIC ACID mmol/L 2 8* 3 3*     No results found for: TROPONINI    Imaging: CXR: no acute findings  I have personally reviewed pertinent reports  and I have personally reviewed pertinent films in PACS  CT:  CT head and neck: Severe (70-90%) stenosis at the origin of the right internal carotid artery  Remainder of the vessel is diminutive likely secondary to proximal stenosis  2   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  Very minimal opacification of distal M3 branches suggesting poor collateral flow  I have personally reviewed pertinent reports  and I have personally reviewed pertinent films in PACS  EKG: normal sinus rhythm on telemetry  personally reviewed by myself  Micro:  Blood Culture: No results found for: BLOODCX  Urine Culture: No results found for: URINECX  Sputum Culture: No components found for: SPUTUMCX  Wound Culure: No results found for: WOUNDCULT  ______________________________________________________________________    Assessment:   Principal Problem:    Cerebrovascular accident (CVA) due to thrombosis of right middle cerebral artery (HCC)  Active Problems:    Elevated serum creatinine    Abnormal CK    Leukocytosis    Elevated lactic acid level    Insomnia  Resolved Problems:    * No resolved hospital problems  *      * Cerebrovascular accident (CVA) due to thrombosis of right middle cerebral artery [facility])  Assessment & Plan  · Patient brought in by EMS after being found with altered mental status by his daughter  · Last seen normal on Monday by daughter  Per ER physician, daughter tried calling to check on patient the last 2 days and today went to check on him as he was not answering his phone  She found him on the bathroom floor with left sided weakness and altered mentation  · Upon arrival to the ER, a stroke alert was called  NIH scale 13 on admission per Dr Guadalupe [place]  Patient displayed right sided gaze preference, left sided weakness, left sided facial droop  Speech is incoherent     · CT head, CTA head neck revealed "Severe (70-90%) stenosis at the origin of the right internal carotid artery  Remainder of the vessel is diminutive likely secondary to proximal stenosis  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  Very minimal opacification of distal M3 branches suggesting poor collateral flow"  · Endovascular Neurology was contacted by Dr Lulu Hernandez who declined intervention given age and unknown onset   · Dr Sarah Ta with Neurology was contacted, patient not a tPA candidate   Recommended 300mg rectal ASA and stroke pathway with close neurologic monitoring overnight given large infarct with possibility of swelling as timing of infarct is unknown  · Will admit to step down for neurologic monitoring  · Allow permissive hypertension with SBP goal 160-200  · Hold sedating medications  · CT scan PRN with neurologic changes  · HA1c/lipid panel pending  · Will start statin  · NPO pending speech eval  · MRI tomorrow  · Echocardiogram with bubble study pending     Elevated serum creatinine  Assessment & Plan  · Unknown baseline creatinine, presented with creatinine 1 5  · Consider prerenal ELOISE vs ATN from rhabdomyolysis as evidence by elevated CK level   · Continue to monitor renal indices  · Strict I&Os  · Daily weights    Leukocytosis  Assessment & Plan  · Leukocytosis of 16K POA, likely reactive  · Will hold on initiating antibiotics at this time  · Xray without obvious infiltrate  · UA negative for UTI  · Blood cultures pending  · Sputum cx ordered  · Check procalcitonin     Abnormal CK  Assessment & Plan  · CK 5,381 on admission, likely secondary to prolonged downtime  · Will trend after IV hydration  · Monitor renal function     Elevated lactic acid level  Assessment & Plan  · Unknown downtime as patient was found on the floor  · Likely secondary to dehdyration  · Will monitor after resuscitation   · Received 1L NSS in the ER  · Continue NSS for IV hydration overnight    Insomnia  Assessment & Plan  · Hold home seroquel and remeron for improved neurologic monitoring Plan:    Neuro:   Subacute right MCA infarct with significant right internal carotid stenosis and M1 occlusion  · Not candidate for endovascular intervention or TPA  · Neurologic monitoring per stroke pathway  · SBP goal 160-200  · Hold sedating medications  · NPO pending speech consult  · ASA/lipitor    CV:   · Allow permissive hypertension with SBP goal 160-200  · PRN hydralazine for SBP >200  · Normal sinus rhythm on telemetry     Pulm:   · Tolerating 2L NC, wean for goal SP02 >94%  · Pulmonary hygiene    GI:   · No acute issues  · NPO until passed by speech therapy    :  · Possible ELOISE however, unclear baseline creatinine  · Monitor renal function and urine output  · Elevated CK levels, continue to trend after resuscitation     FEN:   · Received 1L NSS in the ER  · Continue IV hydration with NSS given potential for cerebral edema  · Monitor lactic and endpoints  · Goal K>4, Mg>2    ID:   · Leukocytosis likely reactive  · Hold on antibiotics at this time  · Cultures pending     Heme:   · Hemoglobin/platelets stable  · No signs/symptoms of bleeding     Endo:   · TSH within normal limits  · HA1C pending  · Glucose stable     MSK/Skin:   · Turn/frequent repositioning  · PT/OT per stroke pathway     Family:  · Family updated: yes     Disposition: Admit to Stepdown    Counseling / Coordination of Care  Total time spent today 48 minutes  Greater than 50% of total time was spent with the patient and / or family counseling and / or coordination of care  A description of the counseling / coordination of care:     ______________________________________________________________________    VTE Pharmacologic Prophylaxis: Heparin  VTE Mechanical Prophylaxis: sequential compression device    Invasive lines and devices:   Invasive Devices     Peripheral Intravenous Line            Peripheral IV 07/17/19 Left Hand less than 1 day    Peripheral IV 07/17/19 Right Antecubital less than 1 day          Drain            External Urinary Catheter Small less than 1 day                Code Status: Level 3 - DNAR and DNI  POA:    POLST:      Given critical illness, patient length of stay will require greater than two midnights      Signature: WYATT Veras  Date: 7/18/2019

## 2019-07-18 NOTE — ASSESSMENT & PLAN NOTE
Patient is status post fall pelvic x-ray obtained  Results demonstrated possible nondisplaced right superior pubic ramus fracture    Patient does appear tender on his right side  Will continue to monitor

## 2019-07-18 NOTE — PHYSICAL THERAPY NOTE
PT EVALUATION     07/18/19 1215   Pain Assessment   Pain Assessment Reddy-Baker FACES   Reddy-Baker FACES Pain Rating 4  (L groin, R hip/buttock area and L foot)   Home Living   Type of [address] [place] [address] One level;Stairs to enter with rails  (couple stairs to enter as per family)   Home Equipment   (none)   Additional Comments patient independent without assistive device   Prior Function   Level of [place] Independent with ADLs and functional mobility   Lives With Alone   ADL Assistance Independent   IADLs Independent   Falls in the last 6 months 1 to 4   Comments patient independent and mowing lawn prior to admission   Restrictions/Precautions   Other Precautions Chair Alarm; Bed Alarm;Multiple lines; Fall Risk;Pain  (in ICU with L hemiparesis)   General   Additional Pertinent History chart reviewed, patient admitted with CVA and L sideed weakness, now seen in ICU   Family/Caregiver Present Yes   Cognition   Overall Cognitive Status Impaired   Arousal/Participation Lethargic   Attention Attends with cues to redirect   Orientation Level Oriented to person;Oriented to place   Following Commands Follows one step commands with increased time or repetition   Comments patient lethargic in ICU   RLE Assessment   RLE Assessment   (ROM WFL, strength 3+/5 with R hip/buttock pain)   LLE Assessment   LLE Assessment   (ROM WFL with L groin pain, flaccid except toes wiggle)   Bed Mobility   Supine to Sit Unable to assess   Transfers   Sit to Stand Unable to assess   Ambulation/Elevation   Gait Assistance Not tested   Activity Tolerance   Activity Tolerance Treatment limited secondary to medical complications (Comment); Patient limited by fatigue;Patient limited by pain  (in ICU with pain and lethargic, nurse aware of pain)   Assessment   Prognosis Good   Problem List Decreased strength;Decreased range of motion;Decreased endurance; Impaired balance;Decreased mobility; Decreased coordination; Impaired tone   Assessment Patient seen for Physical Therapy evaluation  Patient admitted with Cerebrovascular accident (CVA) due to thrombosis of right middle cerebral artery ([facility] [address] )  Comorbidities affecting patient's physical performance include: anxiety disorder, depression, urinary retention, insomnia, abnormal CK  Personal factors affecting patient at time of initial evaluation include: stairs to enter home, inability to ambulate household distances, inability to navigate community distances, inability to navigate level surfaces without external assistance, inability to perform dynamic tasks in community, limited home support, positive fall history, inability to perform caregiver support/tasks, inability to perform physical activity, inability to perform ADLS and inability to perform IADLS   Prior to admission, patient was independent with functional mobility without assistive device, independent with ADLS, independent with IADLS, ambulating household distance and ambulating community distances  Please find objective findings from Physical Therapy assessment regarding body systems outlined above with impairments and limitations including weakness, decreased ROM, impaired balance, decreased endurance, impaired coordination, gait deviations, pain, decreased activity tolerance, decreased functional mobility tolerance, fall risk and impaired tone  The Barthel Index was used as a functional outcome tool presenting with a score of 0 today indicating marked limitations of functional mobility and ADLS  Patient's clinical presentation is currently unstable/unpredictable as seen in patient's presentation of vital sign response, changing level of pain, increased fall risk, new onset of impairment of functional mobility, decreased endurance and new onset of weakness  Pt would benefit from continued Physical Therapy treatment to address deficits as defined above and maximize level of functional mobility   As demonstrated by objective findings, the assigned level of complexity for this evaluation is high  Goals   Patient Goals patient lethargic in ICU unable to state   STG Expiration Date 07/25/19   Short Term Goal #1 sitting balance to fair   Short Term Goal #2 strength RLE to 4-/5, LLE 2-/5    LTG Expiration Date 08/01/19   Long Term Goal #1 assess standing transfers and gait    Long Term Goal #2 gait with mod assist of 2 with hemiwalker   Plan   Treatment/Interventions ADL retraining;Functional transfer training;LE strengthening/ROM; Therapeutic exercise; Endurance training;Patient/family training;Equipment eval/education; Bed mobility;Gait training; Compensatory technique education   PT Frequency Once a day   Recommendation   Recommendation Short-term skilled PT   Barthel Index   Feeding 0   Bathing 0   Grooming Score 0   Dressing Score 0   Bladder Score 0   Bowels Score 0   Toilet Use Score 0   Transfers (Bed/Chair) Score 0   Mobility (Level Surface) Score 0   Stairs Score 0   Barthel Index Score 0   Licensure   [place] License Number  Lizet Alarcon  [id number]

## 2019-07-18 NOTE — ASSESSMENT & PLAN NOTE
· Unknown downtime as patient was found on the floor  · Likely secondary to dehdyration  · Will monitor after resuscitation    · Continue NSS for IV hydration

## 2019-07-18 NOTE — TELEPHONE ENCOUNTER
Incoming Vascular Consult Telephone Note    Kerline López from [facility] called with routine consult:    Return Phone Number: [address] [facility]     [phone]    ICU 12-ICU 12 (ICU 12-01) ICU 12-ICU 12 (ICU 12-01)     Provider Requesting: Dr Tristin Ling    Diagnosis for Consult: Carotid Stenosis

## 2019-07-18 NOTE — PLAN OF CARE
Problem: Potential for Falls  Goal: Patient will remain free of falls  Description  INTERVENTIONS:  - Assess patient frequently for physical needs  -  Identify cognitive and physical deficits and behaviors that affect risk of falls  -  [place] fall precautions as indicated by assessment   - Educate patient/family on patient safety including physical limitations  - Instruct patient to call for assistance with activity based on assessment  - Modify environment to reduce risk of injury  - Consider OT/PT consult to assist with strengthening/mobility  Outcome: Progressing     Problem: Prexisting or High Potential for Compromised Skin Integrity  Goal: Skin integrity is maintained or improved  Description  INTERVENTIONS:  - Identify patients at risk for skin breakdown  - Assess and monitor skin integrity  - Assess and monitor nutrition and hydration status  - Monitor labs (i e  albumin)  - Assess for incontinence   - Turn and reposition patient  - Assist with mobility/ambulation  - Relieve pressure over bony prominences  - Avoid friction and shearing  - Provide appropriate hygiene as needed including keeping skin clean and dry  - Evaluate need for skin moisturizer/barrier cream  - Collaborate with interdisciplinary team (i e  Nutrition, Rehabilitation, etc )   - Patient/family teaching  Outcome: Progressing     Problem: Neurological Deficit  Goal: Neurological status is stable or improving  Description  Interventions:  - Monitor and assess patient's level of consciousness, motor function, sensory function, and level of assistance needed for ADLs  - Monitor and report changes from baseline  Collaborate with interdisciplinary team to initiate plan and implement interventions as ordered  - Provide and maintain a safe environment  - Utilize seizure precautions  - Utilize fall precautions  - Utilize aspiration precautions  - Utilize bleeding precautions  Outcome: Progressing     Problem:  Activity Intolerance/Impaired Mobility  Goal: Mobility/activity is maintained at optimum level for patient  Description  Interventions:  - Assess and monitor patient  barriers to mobility and need for assistive/adaptive devices  - Assess patient's emotional response to limitations  - Collaborate with interdisciplinary team and initiate plans and interventions as ordered  - Encourage independent activity per ability   - Maintain proper body alignment  - Perform active/passive rom as tolerated/ordered  - Plan activities to conserve energy   - Turn patient  Outcome: Progressing     Problem: Communication Impairment  Goal: Ability to express needs and understand communication  Description  Assess patient's communication skills and ability to understand information  Patient will demonstrate use of effective communication techniques, alternative methods of communication and understanding even if not able to speak  - Encourage communication and provide alternate methods of communication as needed  - Collaborate with case management/ for discharge needs  - Include patient/family/caregiver in decisions related to communication  Outcome: Progressing     Problem: Potential for Aspiration  Goal: Non-ventilated patient's risk of aspiration is minimized  Description  Assess and monitor vital signs, respiratory status, and labs (WBC)  Monitor for signs of aspiration (tachypnea, cough, rales, wheezing, cyanosis, fever)  - Assess and monitor patient's ability to swallow  - Place patient up in chair to eat if possible  - HOB up at 90 degrees to eat if unable to get patient up into chair   - Supervise patient during oral intake  - Instruct patient to take small bites  - Instruct patient to take small single sips when taking liquids    - Follow patient-specific strategies generated by speech pathologist   Outcome: Progressing     Problem: Nutrition  Goal: Nutrition/Hydration status is improving  Description  Monitor and assess patient's nutrition/hydration status for malnutrition (ex- brittle hair, bruises, dry skin, pale skin and conjunctiva, muscle wasting, smooth red tongue, and disorientation)  Collaborate with interdisciplinary team and initiate plan and interventions as ordered  Monitor patient's weight and dietary intake as ordered or per policy  Utilize nutrition screening tool and intervene per policy  Determine patient's food preferences and provide high-protein, high-caloric foods as appropriate  - Assist patient with eating   - Allow adequate time for meals   - Encourage patient to take dietary supplement as ordered  - Collaborate with clinical nutritionist   - Include patient/family/caregiver in decisions related to nutrition    Outcome: Progressing     Problem: PAIN - ADULT  Goal: Verbalizes/displays adequate comfort level or baseline comfort level  Description  Interventions:  - Encourage patient to monitor pain and request assistance  - Assess pain using appropriate pain scale  - Administer analgesics based on type and severity of pain and evaluate response  - Implement non-pharmacological measures as appropriate and evaluate response  - Consider cultural and social influences on pain and pain management  - Notify physician/advanced practitioner if interventions unsuccessful or patient reports new pain  Outcome: Progressing     Problem: SAFETY ADULT  Goal: Maintain or return to baseline ADL function  Description  INTERVENTIONS:  -  Assess patient's ability to carry out ADLs; assess patient's baseline for ADL function and identify physical deficits which impact ability to perform ADLs (bathing, care of mouth/teeth, toileting, grooming, dressing, etc )  - Assess/evaluate cause of self-care deficits   - Assess range of motion  - Assess patient's mobility; develop plan if impaired  - Assess patient's need for assistive devices and provide as appropriate  - Encourage maximum independence but intervene and supervise when necessary  ¯ Involve family in performance of ADLs  ¯ Assess for home care needs following discharge   ¯ Request OT consult to assist with ADL evaluation and planning for discharge  ¯ Provide patient education as appropriate  Outcome: Progressing  Goal: Maintain or return mobility status to optimal level  Description  INTERVENTIONS:  - Assess patient's baseline mobility status (ambulation, transfers, stairs, etc )    - Identify cognitive and physical deficits and behaviors that affect mobility  - Identify mobility aids required to assist with transfers and/or ambulation (gait belt, sit-to-stand, lift, walker, cane, etc )  - [place] fall precautions as indicated by assessment  - Record patient progress and toleration of activity level on Mobility SBAR; progress patient to next Phase/Stage  - Instruct patient to call for assistance with activity based on assessment  - Request Rehabilitation consult to assist with strengthening/weightbearing, etc   Outcome: Progressing     Problem: Knowledge Deficit  Goal: Patient/family/caregiver demonstrates understanding of disease process, treatment plan, medications, and discharge instructions  Description  Complete learning assessment and assess knowledge base  Interventions:  - Provide teaching at level of understanding  - Provide teaching via preferred learning methods  Outcome: Progressing     Problem: CARDIOVASCULAR - ADULT  Goal: Maintains optimal cardiac output and hemodynamic stability  Description  INTERVENTIONS:  - Monitor I/O, vital signs and rhythm  - Monitor for S/S and trends of decreased cardiac output i e  bleeding, hypotension  - Administer and titrate ordered vasoactive medications to optimize hemodynamic stability  - Assess quality of pulses, skin color and temperature  - Assess for signs of decreased coronary artery perfusion - ex   Angina  - Instruct patient to report change in severity of symptoms  Outcome: Progressing  Goal: Absence of cardiac dysrhythmias or at baseline rhythm  Description  INTERVENTIONS:  - Continuous cardiac monitoring, monitor vital signs, obtain 12 lead EKG if indicated  - Administer antiarrhythmic and heart rate control medications as ordered  - Monitor electrolytes and administer replacement therapy as ordered  Outcome: Progressing     Problem: METABOLIC, FLUID AND ELECTROLYTES - ADULT  Goal: Electrolytes maintained within normal limits  Description  INTERVENTIONS:  - Monitor labs and assess patient for signs and symptoms of electrolyte imbalances  - Administer electrolyte replacement as ordered  - Monitor response to electrolyte replacements, including repeat lab results as appropriate  - Instruct patient on fluid and nutrition as appropriate  Outcome: Progressing  Goal: Fluid balance maintained  Description  INTERVENTIONS:  - Monitor labs and assess for signs and symptoms of volume excess or deficit  - Monitor I/O and WT  - Instruct patient on fluid and nutrition as appropriate  Outcome: Progressing     Problem: SKIN/TISSUE INTEGRITY - ADULT  Goal: Skin integrity remains intact  Description  INTERVENTIONS  - Identify patients at risk for skin breakdown  - Assess and monitor skin integrity  - Assess and monitor nutrition and hydration status  - Monitor labs (i e  albumin)  - Assess for incontinence   - Turn and reposition patient  - Assist with mobility/ambulation  - Relieve pressure over bony prominences  - Avoid friction and shearing  - Provide appropriate hygiene as needed including keeping skin clean and dry  - Evaluate need for skin moisturizer/barrier cream  - Collaborate with interdisciplinary team (i e  Nutrition, Rehabilitation, etc )   - Patient/family teaching  Outcome: Progressing  Goal: Oral mucous membranes remain intact  Description  INTERVENTIONS  - Assess oral mucosa and hygiene practices  - Implement preventative oral hygiene regimen  - Implement oral medicated treatments as ordered  - Initiate Nutrition services referral as needed  Outcome: Progressing     Problem: MUSCULOSKELETAL - ADULT  Goal: Maintain or return mobility to safest level of function  Description  INTERVENTIONS:  - Assess patient's ability to carry out ADLs; assess patient's baseline for ADL function and identify physical deficits which impact ability to perform ADLs (bathing, care of mouth/teeth, toileting, grooming, dressing, etc )  - Assess/evaluate cause of self-care deficits   - Assess range of motion  - Assess patient's mobility; develop plan if impaired  - Assess patient's need for assistive devices and provide as appropriate  - Encourage maximum independence but intervene and supervise when necessary  - Involve family in performance of ADLs  - Assess for home care needs following discharge   - Request OT consult to assist with ADL evaluation and planning for discharge  - Provide patient education as appropriate  Outcome: Progressing  Goal: Maintain proper alignment of affected body part  Description  INTERVENTIONS:  - Support, maintain and protect limb and body alignment  - Provide pt/fam with appropriate education  Outcome: Progressing

## 2019-07-18 NOTE — ASSESSMENT & PLAN NOTE
· Leukocytosis of 16K POA, likely reactive  · Will hold on initiating antibiotics at this time  · Xray without obvious infiltrate  · UA negative for UTI  · Blood cultures pending  · Sputum cx ordered  · Procalcitonin 0 21

## 2019-07-18 NOTE — PROGRESS NOTES
Daily Progress Note - Critical Care/ Stepdown   Peter Massey 81 y o  male MRN: [id number]  Unit/Bed#: ICU 12 Encounter: [id number]    ______________________________________________________________________  Assessment:   Principal Problem:    Cerebrovascular accident (CVA) due to thrombosis of right middle cerebral artery (HCC)  Active Problems:    Abnormal CK    Elevated lactic acid level    Elevated serum creatinine    Insomnia    Leukocytosis    Depression with anxiety  Resolved Problems:    * No resolved hospital problems  *      * Cerebrovascular accident (CVA) due to thrombosis of right middle cerebral artery [facility])  Assessment & Plan  · Patient brought in by EMS after being found with altered mental status by his step-daughter, POA  · Patient was last seen normal on Monday, patient was then found down on the bathroom floor with left-sided weakness and altered mentation 7/17,   · Patient was a stroke alert  NIH scale 13 on admission per Dr Pari Thacker  Patient displayed right sided gaze preference, left sided weakness, left sided facial droop  Speech is incoherent  · CT head, CTA head neck revealed "Severe (70-90%) stenosis at the origin of the right internal carotid artery  Remainder of the vessel is diminutive likely secondary to proximal stenosis  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  Very minimal opacification of distal M3 branches suggesting poor collateral flow"  · Endovascular Neurology was contacted by Dr Pari Thacker who declined intervention given age and unknown onset   · Dr Dominik Garcia with Neurology was contacted, patient not a tPA candidate  Recommended 300mg rectal ASA and stroke pathway with close neurologic monitoring overnight given large infarct with possibility of swelling as timing of infarct is unknown  · Allow permissive hypertension with SBP goal 160-200  · Hold sedating medications  · CT scan PRN with neurologic changes  · HA1c/lipid panel pending  · Lipitor 40 mg p o  Daily  · NPO pending speech eval  · MRI pending  · Echocardiogram with bubble study pending     Abnormal CK  Assessment & Plan  · CK 5,381 on admission, likely secondary to prolonged downtime  · Now 6896  · Increase IV fluids to 150 mL/hour  · Continue to monitor and trend  · Monitor renal function     Elevated lactic acid level  Assessment & Plan  · Unknown downtime as patient was found on the floor  · Likely secondary to dehdyration  · Will monitor after resuscitation    · Continue NSS for IV hydration     Elevated serum creatinine  Assessment & Plan  · Unknown baseline creatinine, presented with creatinine 1 5, GFR 41 suspect chronic component    · Consider prerenal ELOISE vs ATN from rhabdomyolysis as evidence by elevated CK level   · Continue to monitor renal indices  · Continue IV hydration  · Strict I&Os  · Daily weights    Insomnia  Assessment & Plan  · Hold home seroquel and remeron for improved neurologic monitoring     Leukocytosis  Assessment & Plan  · Leukocytosis of 16K POA, likely reactive  · Will hold on initiating antibiotics at this time  · Xray without obvious infiltrate  · UA negative for UTI  · Blood cultures pending  · Sputum cx ordered  · Procalcitonin 0 21    Depression with anxiety  Assessment & Plan  Continue to hold patient's at-home medications      Plan:    Neuro:   · Sedation plan: Avoid sedating medications  · Neurological exams per unit protocol  · Delirium: CAM ICU positive no   · Pain controlled with: NA  · Pain score: none  · Regulate sleep/wake cycle    CV:   · Permissive hypertension maintain systolic blood pressures around 160  · Rhythm: NSR  · Follow rhythm on telemetry    Pulm:   · NT suction as needed for airway clearance  · Titrate supplemental oxygen for O2 saturation greater than 92%    GI:   · Nutrition/diet plan:  NPO for speech and swallow evaluation  · Stress ulcer prophylaxis: No prophylaxis needed  · Bowel regimen: None currently  · Last BM:  Pre-hospital 7/17    FEN: · Continue normal saline at 150 mL/hour  · Fluid/Diuretic plan:   · Goal 24 hour fluid balance:  Euvolemic  · Electrolytes repleted: not applicable  · Goal: K >0 4, Mag >2 0, and Phos >3 0    :   · Indwelling Cohen present: no   · [place] catheter in place draining dark yellow urine    ID:   · No evidence of acute infection at this time  · Trend temps and WBC count    Heme:   · Hemoglobin stable  · Trend hgb and plts    Endo:   · Glycemic control plan: N/A    Msk/Skin:  · Mobility goal:  Maintain skin integrity  · PT consult: yes  · OT consult: yes  · Frequent turning and off-loading    Family:  · Family updated within 24 hours: yes   · Family meeting planned today: no    VTE Prophylaxis:  · Pharmacologic Prophylaxis: Heparin  · Mechanical Prophylaxis: sequential compression device    Disposition: Transfer to telemetry    Code Status: Level 3 - DNAR and DNI    Counseling / Coordination of Care  Total time spent today 35 minutes  Greater than 50% of total time was spent with the patient and / or family counseling and / or coordination of care   ______________________________________________________________________    HPI/24hr events:   Patient admitted last evening as a stroke alert  CT scan/CT a demonstrated occlusion of the right middle cerebral artery as well as severe stenosis of the right internal carotid artery  Patient was deemed not a candidate for intervention  He was transferred to step-down unit for frequent neuro exams  ______________________________________________________________________    Physical Exam:   Physical Exam   Constitutional: Vital signs are normal  He appears well-developed and well-nourished  HENT:   Head: Normocephalic and atraumatic  Mouth/Throat: Oropharynx is clear and moist    Eyes: Pupils are equal, round, and reactive to light  Neck: Neck supple  Cardiovascular: Regular rhythm, S1 normal and S2 normal    Pulmonary/Chest: He has decreased breath sounds     Abdominal: Soft  Bowel sounds are normal  He exhibits distension  Umbilical hernia   Genitourinary:   Genitourinary Comments: [place] catheter in place draining dark yellow urine   Musculoskeletal:   No edema present   Neurological: He is alert  Patient was slurred speech, incoherent most of the time  He is alert oriented to person and time  He states he was at home  Patient is placed left-sided facial droop and dysarthria, left hemiplegia, but he is able to wiggle his left toes  He does display some sensory loss in the left side  Skin: Skin is warm and dry  Capillary refill takes less than 2 seconds  ______________________________________________________________________  Vitals:    19 1439 19 1445 19 1509 19 1600   BP: 136/66 140/67 145/72 152/70   BP Location:    Left arm   Pulse:  63 61 57   Resp:  (!) 33 (!) 33 (!) 35   Temp:    (!) 97 4 °F (36 3 °C)   TempSrc:    Tympanic   SpO2:  95% 96% 98%   Weight:       Height:                  Temperature:   Temp (24hrs), Av 7 °F (36 5 °C), Min:96 °F (35 6 °C), Max:98 5 °F (36 9 °C)    Current Temperature: (!) 97 4 °F (36 3 °C)    Weights:   IBW: 63 8 kg    Body mass index is 28 82 kg/m²    Weight (last 2 days)     Date/Time   Weight    19 0600   81 (178 57)    19 2149   80 4 (177 25)              Hemodynamic Monitoring:  N/A       Non-Invasive/Invasive Ventilation Settings:  Respiratory    Lab Data (Last 4 hours)    None         O2/Vent Data (Last 4 hours)    None              No results found for: PHART, AUJ2QRZ, PO2ART, OBF9RKD, Z3ACNVNQ, BEART, SOURCE  SpO2: SpO2: 98 %    Intake and Outputs:  I/O       701 -  07 -  07 -  0700    I V  (mL/kg)  1028 3 (12 7)     IV Piggyback  1000 500    Total Intake(mL/kg)  2028 3 (25) 500 (6 2)    Urine (mL/kg/hr)  200 1300 (1 6)    Total Output  200 1300    Net  +1828 3 -800           Unmeasured Urine Occurrence  1 x           Nutrition: Diet Orders   (From admission, onward)            Start     Ordered    07/18/19 0737  Room Service  Once     Question:  Type of Service  Answer:  Room Service - Appropriate with Assistance    07/18/19 0736    07/17/19 2149  Diet NPO  Diet effective now     Question Answer Comment   Diet Type NPO    RD to adjust diet per protocol? Yes        07/17/19 2148          Labs:   Results from last 7 days   Lab Units 07/18/19  0506 07/17/19  1905   WBC Thousand/uL 14 71* 16 18*   HEMOGLOBIN g/dL 11 3* 12 3   HEMATOCRIT % 35 3* 38 3   PLATELETS Thousands/uL 196 213   NEUTROS PCT % 78* 88*   MONOS PCT % 11 7     Results from last 7 days   Lab Units 07/18/19  0506 07/17/19  1905   SODIUM mmol/L 143 138   POTASSIUM mmol/L 3 8 4 7   CHLORIDE mmol/L 108 104   CO2 mmol/L 25 24   BUN mg/dL 19 17   CREATININE mg/dL 1 42* 1 55*   CALCIUM mg/dL 7 9* 8 0*   ALK PHOS U/L  --  92   ALT U/L  --  20   AST U/L  --  78*     Results from last 7 days   Lab Units 07/18/19  0506 07/17/19  1905   MAGNESIUM mg/dL 2 0 2 0     Lab Results   Component Value Date    PHOS 3 1 07/18/2019    PHOS 3 5 07/17/2019      Results from last 7 days   Lab Units 07/17/19  1905   INR  1 05   PTT seconds 25     No results found for: TROPONINI  Results from last 7 days   Lab Units 07/18/19  0142 07/17/19  2219 07/17/19  1905   LACTIC ACID mmol/L 1 6 2 8* 3 3*     ABG:No results found for: PHART, PTQ1RCS, PO2ART, DNW3XGU, Q4SWUYQI, BEART, SOURCE    Imaging:   XR hips bilateral 3-4 vw w pelvis if performed   Final Result      Possible nondisplaced right superior pubic ramus fracture  Correlate for point tenderness  Workstation performed: [id number]         MRI brain wo contrast   Final Result   Large acute right MCA territory infarction primarily involving the temporal lobe with lesser degree of involvement of the inferolateral frontal lobe and anterior parietal lobe    Consistent with CT/CTA      Development of acute microhemorrhage within the right lenticular nucleus and adjacent temporal lobe      Findings personally discussed by phone with Dr Bertha Cantu at 3:10 PM, 7/18/2019            Workstation performed: [id number]         XR chest 1 view portable   Final Result      No acute cardiopulmonary disease  Workstation performed: [id number]         CTA stroke alert (head/neck)   Final Result         1  Severe (70-90%) stenosis at the origin of the right internal carotid artery  Remainder of the vessel is diminutive likely secondary to proximal stenosis  2   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  Very minimal opacification of distal M3 branches suggesting poor collateral flow  I personally discussed this study with Emanuel Collins on 7/17/2019 at 7:14 PM                            Workstation performed: [id number]         CT stroke alert brain   Final Result         1  Acute to early subacute right MCA territory infarct involving greater than one third of the vascular territory  No evidence of hemorrhagic conversion or significant mass effect  2   Hyperdense right M1 and M2 branches suggesting acute thrombus  Findings were directly discussed with Emanuel Collins on 7/17/2019 7:16 PM       Workstation performed: [id number]            I have personally reviewed pertinent reports  EKG:  Normal sinus rhythm    Micro:  No results found for: CIPRIANO SzymanskiCULTROSINA    Allergies:    Allergies   Allergen Reactions    Hydrocodone      Medications:   Scheduled Meds:    Current Facility-Administered Medications:  aspirin 325 mg Oral Daily WYATT Hunter    atorvastatin 40 mg Oral QPM WYATT Welch    clopidogrel 300 mg Oral Once Bertha Cantu MD    heparin (porcine) 5,000 Units Subcutaneous [facility] WYATT Hopkins    Labetalol HCl 10 mg Intravenous Q6H PRN WYATT Welch    pneumococcal 13-valent conjugate vaccine 0 5 mL Intramuscular Prior to discharge Long Ricci MD    sodium chloride 150 mL/hr Intravenous Continuous WYATT Castillo Last Rate: 150 mL/hr (07/18/19 1503)     Continuous Infusions:    sodium chloride 150 mL/hr Last Rate: 150 mL/hr (07/18/19 1503)     PRN Meds:    Labetalol HCl 10 mg Q6H PRN   pneumococcal 13-valent conjugate vaccine 0 5 mL Prior to discharge       Invasive lines and devices:   Invasive Devices     Peripheral Intravenous Line            Peripheral IV 07/17/19 Left Hand less than 1 day    Peripheral IV 07/17/19 Right Antecubital less than 1 day          Drain            Urethral Catheter Non-latex 16 Fr  less than 1 day                   SIGNATURE: WYATT Castillo  DATE: July 18, 2019

## 2019-07-18 NOTE — ASSESSMENT & PLAN NOTE
· CK 5,381 on admission, likely secondary to prolonged downtime  · Now 2496  · Increase IV fluids to 150 mL/hour  · Continue to monitor and trend  · Monitor renal function

## 2019-07-18 NOTE — PROGRESS NOTES
Transfer Note - ICU/Stepdown Transfer to [facility]/MS tele   Sofy Grounds Sr 81 y o  male MRN: [id number]  Naomy 45   Unit/Bed#: ICU 12 Encounter: [id number]    Code Status: Level 3 - DNAR and DNI    Reason for ICU/Stepdown admission:   Altered mental status    Active problems: Principal Problem:    Cerebrovascular accident (CVA) due to thrombosis of right middle cerebral artery (HCC)  Active Problems:    Abnormal CK    Elevated lactic acid level    Elevated serum creatinine    Leukocytosis    Impaired swallowing    Urinary retention    Closed fracture of right superior pubic ramus (HCC)    Insomnia    Depression with anxiety  Resolved Problems:    * No resolved hospital problems  *      * Cerebrovascular accident (CVA) due to thrombosis of right middle cerebral artery [facility]  Assessment & Plan  · Patient brought in by EMS after being found with altered mental status by his step-daughter, POA  · Patient was last seen normal on Monday, patient was then found down on the bathroom floor with left-sided weakness and altered mentation 7/17,   · Patient was a stroke alert  NIH scale 13 on admission per Dr Char Ulloa  Patient displayed right sided gaze preference, left sided weakness, left sided facial droop  Speech is incoherent  · CT head, CTA head neck revealed "Severe (70-90%) stenosis at the origin of the right internal carotid artery  Remainder of the vessel is diminutive likely secondary to proximal stenosis  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  Very minimal opacification of distal M3 branches suggesting poor collateral flow"  · Endovascular Neurology was contacted by Dr Char Ulloa who declined intervention given age and unknown onset   · Dr Imelda Stallworth with Neurology was contacted, patient not a tPA candidate   Recommended 300mg rectal ASA and stroke pathway with close neurologic monitoring overnight given large infarct with possibility of swelling as timing of infarct is unknown  · Allow permissive hypertension with SBP goal 160-200  · Hold sedating medications  · CT scan PRN with neurologic changes  · HA1c/lipid panel pending  · Lipitor 40 mg p o  Daily  · NPO pending speech eval  · MRI pending  · Echocardiogram with bubble study pending     Abnormal CK  Assessment & Plan  · CK 5,381 on admission, likely secondary to prolonged downtime  · Now 6896  · Increase IV fluids to 150 mL/hour  · Continue to monitor and trend  · Monitor renal function     Elevated lactic acid level  Assessment & Plan  · Unknown downtime as patient was found on the floor  · Likely secondary to dehdyration  · Will monitor after resuscitation    · Continue NSS for IV hydration     Elevated serum creatinine  Assessment & Plan  · Unknown baseline creatinine, presented with creatinine 1 5, GFR 41 suspect chronic component  · Consider prerenal ELOISE vs ATN from rhabdomyolysis as evidence by elevated CK level   · Continue to monitor renal indices  · Continue IV hydration  · Strict I&Os  · Daily weights    Impaired swallowing  Assessment & Plan  · Likely secondary to acute CVA  · Patient formal speech and swallow evaluation  Patient had signs and symptoms of significant pharyngeal dysphagia with tsp of thick liquid and is considered high risk for aspiration at this time  · Continue NPO    Leukocytosis  Assessment & Plan  · Leukocytosis of 16K POA, likely reactive  · Will hold on initiating antibiotics at this time  · Xray without obvious infiltrate  · UA negative for UTI  · Blood cultures pending  · Sputum cx ordered  · Procalcitonin 0 21    Urinary retention  Assessment & Plan  Bladder scan obtained which returned 800 cc patient's bladder  In consideration for patient is ELOISE and rhabdomyolysis will place Cohen catheter for accurate I&Os  Closed fracture of right superior pubic ramus [facility])  Assessment & Plan  Patient is status post fall pelvic x-ray obtained    Results demonstrated possible nondisplaced right superior pubic ramus fracture  Patient does appear tender on his right side  Will continue to monitor    Insomnia  Assessment & Plan  · Hold home seroquel and remeron for improved neurologic monitoring     Depression with anxiety  Assessment & Plan  Continue to hold patient's at-home medications      Consultants:   · Neurology    History of Present Illness/Summary of clinical course:   18-year-old male, formal smoker, with a past medical history of malignant neoplasm of the colon with resection, insomnia, generalized anxiety with depression  Patient was last known normal on Monday by his stepdaughter  She states he was out mowing his lawn in the early hours  She did not hear from him so went yesterday 7/17 to check on him  He was found down in the bathroom altered with left-sided weakness  Emergency services was activated and patient was brought to the emergency department as a stroke alert  CTA/CT of the head demonstrated severe stenosis of the right internal carotid as well as occlusion of the right middle cerebral artery  Endovascular Neurology as well as Neurology was consulted  Patient was deemed not a candidate for intervention  On admission patient's laboratory values revealed a likely a KI with CK increased to the 6000, likely rhabdomyolysis as patient was found down  Patient was admitted to the step-down unit for close neurological monitoring with stroke pathway  MRI of the brain 7/18 demonstrated a large acute right MCA territory infarction memory involving the temporal lobe with lesser degree of involvement of the inferior lateral frontal lobe and anterior old parietal lobe  Development of acute microhemorrhage within the right lenticular nucleus and adjacent temporal lobe  Patient was started on aspirin and loaded with Plavix  Formal speech and swallow evaluation was obtained  Results demonstrate the patient is high risk for aspiration as he had significant pharyngeal dysphagia  Patient had decreased urine output so a bladder scan was obtained which demonstrated over 800 mL in the patient's bladder  Patient a Cohen catheter placed  X-rays of the pelvis were obtained due to patient's fall  Results demonstrated a possible nondisplaced right superior pubic ramus fracture  At this time patient denies pain  Discussion with patient's son and stepdaughter at bedside  Of note patient's Cydne Stagers is patient's POA  Patient was fully functional prior to this acute incident and was able to mow his lawn and participate in activities of daily living  Please refer to today's progress note for further clinical details  Recent or scheduled procedures:   XR hips bilateral 3-4 vw w pelvis if performed   Final Result      Possible nondisplaced right superior pubic ramus fracture  Correlate for point tenderness  Workstation performed: [id number]         MRI brain wo contrast   Final Result   Large acute right MCA territory infarction primarily involving the temporal lobe with lesser degree of involvement of the inferolateral frontal lobe and anterior parietal lobe  Consistent with CT/CTA      Development of acute microhemorrhage within the right lenticular nucleus and adjacent temporal lobe      Findings personally discussed by phone with Dr Erick Huitron at 3:10 PM, 7/18/2019            Workstation performed: [id number]         XR chest 1 view portable   Final Result      No acute cardiopulmonary disease  Workstation performed: [id number]         CTA stroke alert (head/neck)   Final Result         1  Severe (70-90%) stenosis at the origin of the right internal carotid artery  Remainder of the vessel is diminutive likely secondary to proximal stenosis  2   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  Very minimal opacification of distal M3 branches suggesting poor collateral flow        I personally discussed this study with Argelia Ibarra on 7/17/2019 at 7:14 PM                            Workstation performed: [id number]         CT stroke alert brain   Final Result         1  Acute to early subacute right MCA territory infarct involving greater than one third of the vascular territory  No evidence of hemorrhagic conversion or significant mass effect  2   Hyperdense right M1 and M2 branches suggesting acute thrombus  Findings were directly discussed with Nely Yoder on 7/17/2019 7:16 PM       Workstation performed: [id number]             Outstanding/pending diagnostics:   NA       Mobilization Plan:   Encourage early ambulation  PT OT consulted    Nutrition Plan:  NPO until p o  Patient passed a speech and swallow  Patient may require alternative feeding modalities  [  ] Family aware of transfer out of critical care: yes   [  ] Home medications that are not reordered and reason why:  Patient failed speech and swallow evaluation  Spoke with Dr Aniya Monreal regarding transfer @ 1600  Patient accepted to their service      WYATT Lakhani

## 2019-07-18 NOTE — SPEECH THERAPY NOTE
Speech-Language Pathology Bedside Swallow Evaluation      Patient Name: Irene Moy Date: 7/18/2019     Problem List  Patient Active Problem List   Diagnosis    Depression    Generalized anxiety disorder    Insomnia    Cerebrovascular accident (CVA) due to thrombosis of right middle cerebral artery (HCC)    Elevated lactic acid level    Abnormal CK    Elevated serum creatinine    Leukocytosis       Past Medical History  Past Medical History:   Diagnosis Date    Malignant neoplasm of descending colon ([facility] [address] ) 05/12/2006    Neuralgia     Last Assessed:6/25/13       Past Surgical History  No past surgical history on file  Summary   Pt presented with s/s significant pharyngeal dysphagia with tsps thick liquid/high risk for aspiration at this time with food/liquid and presents c s/s impaired mgmt of secretions/saliva (audible retention of secretions in upper airway c no auscultation needed)    Recommendations: NPO     Recommended Form of Meds: non-oral if possible         Current Medical Status  Tone Melendez is a 81 y o  male who presents via EMS after being found to have left sided weakness and altered mental status last pm  Upon arrival to the ED, a stroke alert was called  CT head and neck revealed occlusion of the right MCA with minimal opacification of the distal M3 branches as well as severe right carotid stenosis  Endovascular neurology was contacted but patient is not a candidate for intervention  Dr Xavi Castillo (Neurology) reviewed CT finding and felt this is subacute/late acute CVA that is atheroembolic from right carotid stenosis and M1 occlusion  Pt is NPO  SLP Swallowing Evaluation ordered at this time  Past medical history:  Please see H&P for details    Special Studies of 7/17:  MRI pending  HCT: Acute to early subacute right MCA territory infarct involving greater than one third of the vascular territory    No evidence of hemorrhagic conversion or significant mass effect  Hyperdense right M1 and M2 branches suggesting acute thrombus  CTA: Severe (70-90%) stenosis at the origin of the right internal carotid artery  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  Very minimal opacification of distal M3 branches suggesting poor collateral flow  CXR: No acute cardiopulmonary disease  Social/Education/Vocational Hx:  Pt lives with family      Swallow Information   Current Risks for Dysphagia & Aspiration: CVA     Current Diet: NPO      Baseline Diet: regular diet and thin liquids      Baseline Assessment   Behavior/Cognition: alert    Speech/Language Status: able to follow commands and attempting to communicate but pt with impaired coordination of respiration with speech attempts with inconsistent phonation at best across single words/phrases (at greatest length)  Patient Positioning: upright in bed    Pain Status/Interventions/Response to Interventions:  No report of or nonverbal indications of pain  Swallow Mechanism Exam   Facial: left facial droop  Labial: decreased ROM left side  Lingual: left sided tongue deviation  Velum: unable to visualize  Dentition: edentulous  Vocal quality:breathy and inconsistent phonation of minimal length   Volitional Cough: weak   Reflexive Cough: weak  Respiratory Status:  RR 30 at baseline, O2 sat of 97% at baseline  HR 61 baseline    Consistencies Assessed and Performance   Consistencies Administered: honey thick and puree (2 tsps each)    Oral Stage:   Bolus formation and transfer were at least moderately delayed  (presume challenge in coordinating transfer/swallow with respiration)  No significant oral residue noted  +Risk for reduced oral control  Pharyngeal Stage: suspect significant impairment    Swallow Mechanics:  Swallowing initiation was delayed  Laryngeal rise was palpated and judged to be weak    Cough, change in vocal quality or BS (increased expiratory wheeze) noted with HTL which pt requested more of) and with 1 of 2 tsps of puree (no overt s/s when head held in midline position)        Esophageal Concerns: none reported    Strategies and Efficacy: small boluses utilized, optimal body and head positioning attempted      Summary and Recommendations (see above)    Results Reviewed with: RN    Additional Evaluation/Treatment Recommended: yes     Frequency of treatment: continue assessment Fri    Patient Stated Goal:  "water"    Dysphagia Goals per SLP: establish nutrition/total POC within 72 hrs

## 2019-07-18 NOTE — ASSESSMENT & PLAN NOTE
· Unknown baseline creatinine, presented with creatinine 1 5, GFR 41 suspect chronic component    · Consider prerenal ELOISE vs ATN from rhabdomyolysis as evidence by elevated CK level   · Continue to monitor renal indices  · Continue IV hydration  · Strict I&Os  · Daily weights

## 2019-07-19 NOTE — PROGRESS NOTES
Neurology Consult Follow Up      Pasquale Latif is a 81 y o  male  ICU 12/ICU 12    [id number]        Assessment/Recommendations:    1  Acute to subacute ischemic large right MCA infarction   2  New onset A fib  3  Right carotid severe stenosis, symptomatic   4  HLD    Clinically patient has had no improvement in his hemiparesis  Etiology likely thromboembolic from right carotid stenosis  It's hard to determine whether A fib could have contributed  A fib is most likely secondary to current stress  Strokes don't appear as though cardio embolic but can't say that for certain  Cont Aspirin rectal now and if can swallow 81mg daily  Repeat CT brain tomm to see if stroke is stable as well as micro hemorrhage  If stable, then can start him on eliquis 5mg bid  Vascular surgery was consulted  They will consider CEA as outpatient  When CK is normal, recommend lipitor 80mg  Until then 40mg is ok  LDL is 140  TTE: limited study  Recommend systolic bp in range of 059-859  Allow permissive hypertension  Speech and swallow therapy  PT/OT  Discussed plan with primary team            Chief Complaint:  stroke  Subjective:   Patient has remained stable in terms of alertness and cognition  Today he's more alert  Overnight he has developed A fib with RVR  His blood pressure still remains on low side  Past Medical History:   Diagnosis Date    Malignant neoplasm of descending colon ([facility] [address] ) 05/12/2006    Neuralgia     Last Assessed:6/25/13     Social History     Socioeconomic History    Marital status:       Spouse name: Not on file    Number of children: Not on file    Years of education: Not on file    Highest education level: Not on file   Occupational History    Not on file   Social Needs    Financial resource strain: Not on file    Food insecurity:     Worry: Not on file     Inability: Not on file    Transportation needs:     Medical: Not on file     Non-medical: Not on file   Tobacco Use    Smoking status: Former Smoker    Smokeless tobacco: Never Used   Substance and Sexual Activity    Alcohol use: Not Currently     Frequency: Never     Binge frequency: Never    Drug use: No    Sexual activity: Not on file   Lifestyle    Physical activity:     Days per week: Not on file     Minutes per session: Not on file    Stress: Not on file   Relationships    Social connections:     Talks on phone: Not on file     Gets together: Not on file     Attends [employer] service: Not on file     Active member of club or organization: Not on file     Attends meetings of clubs or organizations: Not on file     Relationship status: Not on file    Intimate partner violence:     Fear of current or ex partner: Not on file     Emotionally abused: Not on file     Physically abused: Not on file     Forced sexual activity: Not on file   Other Topics Concern    Not on file   Social History Narrative    Not on file     No family history on file  ROS:  Please see HPI for positive symptoms  No fever, no chills, no weight change  Ocular: No drainage, no blurred vision  HEENT:  No sore throat, earache, or congestion  No neck pain  COR:  No chest pain  No palpitations  Lungs:  no sob, wheezing,  GI:  no  nausea, no vomiting, no diarrhea, no constipation, no anorexia  :  No dysuria, frequency, or urgency  No hematuria  No vaginal discharge or vaginal bleeding  Musculoskeletal:  + hip pain  Skin:  No rash or itching  Psychiatric:  no anxiety, no depression  Endocrine:  No polyuria or polydipsia        Objective:  BP 90/50   Pulse (!) 129   Temp 97 5 °F (36 4 °C) (Temporal)   Resp (!) 37   Ht 5' 6" (1 676 m)   Wt 81 kg (178 lb 9 2 oz)   SpO2 94%   BMI 28 82 kg/m²     General: alert   Mental status: oriented x3  Attention: normal  Knowledge: fair  Language and Speech: speech dysarthric   Cranial nerves: II-XII intact right gaze deviation, dysphagia   Muscle tone: flaccid on left side  Motor strength:  LUE, LLE: 0/5,  RUE: 4/5, RLE: 4/5  Sensory: total sensory loss on left side   Gait: bob  Left complete hemiplegia   Coordination: bob on left side  Reflexes: 2+ throughout  except as noted      Labs:      Lab Results   Component Value Date    WBC 10 38 (H) 07/19/2019    HGB 10 2 (L) 07/19/2019    HCT 31 6 (L) 07/19/2019    MCV 95 07/19/2019     07/19/2019     Lab Results   Component Value Date    HGBA1C 6 0 07/18/2019     Lab Results   Component Value Date    ALT 24 07/19/2019    AST 72 (H) 07/19/2019    ALKPHOS 68 07/19/2019     Lab Results   Component Value Date    CALCIUM 7 7 (L) 07/19/2019    K 3 7 07/19/2019    CO2 18 (L) 07/19/2019     (H) 07/19/2019    BUN 19 07/19/2019    CREATININE 1 21 07/19/2019         Review of reports and notes reveal:       Xr Chest Portable    Result Date: 7/19/2019  No acute cardiopulmonary disease  See comments  Workstation performed: [id number]     Xr Chest 1 View Portable    Result Date: 7/18/2019  No acute cardiopulmonary disease  Workstation performed: [id number]     Mri Brain Wo Contrast    Result Date: 7/18/2019  Large acute right MCA territory infarction primarily involving the temporal lobe with lesser degree of involvement of the inferolateral frontal lobe and anterior parietal lobe  Consistent with CT/CTA Development of acute microhemorrhage within the right lenticular nucleus and adjacent temporal lobe Findings personally discussed by phone with Dr Divina Viera at 3:10 PM, 7/18/2019 Workstation performed: [id number]     Ct Stroke Alert Brain    Result Date: 7/17/2019  1  Acute to early subacute right MCA territory infarct involving greater than one third of the vascular territory  No evidence of hemorrhagic conversion or significant mass effect  2   Hyperdense right M1 and M2 branches suggesting acute thrombus   Findings were directly discussed with Osvaldo Gonsalez on 7/17/2019 7:16 PM  Workstation performed: [id number]     Xr Hips Bilateral 3-4 Vw W Pelvis If Performed    Result Date: 7/18/2019  Possible nondisplaced right superior pubic ramus fracture  Correlate for point tenderness  Workstation performed: [id number]     Cta Stroke Alert (head/neck)    Result Date: 7/17/2019  1  Severe (70-90%) stenosis at the origin of the right internal carotid artery  Remainder of the vessel is diminutive likely secondary to proximal stenosis  2   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  Very minimal opacification of distal M3 branches suggesting poor collateral flow  I personally discussed this study with Elder Mcclelland on 7/17/2019 at 7:14 PM  Workstation performed: [id number]             Thank you for this consult      Total time of encounter:  40 min  More than 50% of the time was used in counseling and/or coordination of care  Extent of couseling and/or coordination of care        [facility]  [address]  [address]  [phone]

## 2019-07-19 NOTE — SPEECH THERAPY NOTE
Slp Progress Note    Patient Name: Nathalia Bill Date: 7/19/2019     Subjective:  "My heart rate is going up    Objective:  Pt was seen for dysphagia tx bedside p review of records  He was fully alert & upright  Son, step dtr and step son present  Pt with the following baseline VS (HR in 120-130's, O2 sat of 95% & RR of 28  He was able to coordinate respiration with speech attempts and sustain phonation across 7 syllable utterances today  He was assessed with 4 tsps puree and 2 of HTL were administered slowly over time  Head neutral position mandated (head support provided to avoid strong R head turn)  Pt was able to retrieve material from the spoon & initiate transfer & swallow within 3 secs  Fair laryngeal rise palpated  Pt presented with increased rhonci, reduced O2 sat (2-3%) and delayed cough p the small amt administered  I spoke with family about s/s improvd voicing but concern for aspiration and reduced/delayed cough response, as well as re: plan for continued assessment/diagnostic tx  Assessment:  Pt presented with improved alertness, voicing and coordination of respiration with speech attempts  Unfortunatley, he presented with s/s HIGHLY suggestive of aspiration with honey thick liquid>puree with SIGNIFICANTLY WEAK AND DELAYED RESPONSE  Family present and education in swallow A/P, risks, s/s begun  Plan/Recommendations:  Continue assessment process re: potential for safe oral feedings

## 2019-07-19 NOTE — CONSULTS
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  · Neurology aware  NIH Stroke Scale 13  · Continue with aspirin rectal 300 mg, Lipitor 40 mg may increase to 80 mg x3m once CK stabled  · Plavix 300 mg ordered by neuro  · Heparin 5000 units q8h  · Per medicine hypertension goal systolic blood pressure 285 to 200  · NPO, for now until re-evaluation by speech therapy  · Once medically stable, possible carotid endarectomy per vascular surgery  · Brain MRI: Large acute right MCA territory infarction primarily involving the temporal lobe with lesser degree of involvement of the inferolateral frontal lobe and anterior parietal lobe  · CTA head/neck showed: severe (70-90%) stenosis at the origin of the right internal carotid artery  · Repeat brain CT recommended on  per neurology  · Neuro checks  · Continue w/ PT/OT    Paroxysmal Afib  · [id number] score: 4  · Patient received diltiazem 10 mg IV x1  · Digoxin 62 5 mcg (0 25 mg/ml) now, additional dose this evening; then once daily starting on 19  · Continue with Diltiazem gtt at 12 5 mg/hr  · Continue telemetry  · 19 echo: EF: 50%, g1dd, No Large ASD or large PFO identified by buble study (limited)  · Mild-moderate aortic valve stenosis  · Once medically stable, pt will need a stress test outpatient  Possible non-diplaced right pubic ramus fx  · Management per primary team     Hyperlipidemia  · Lipid panel on admission acceptable  · Lipitor 40 mg may increase to 80 mg x3m once CK stabled as reccommended by neurology  Summary of Recommendations:        · Digoxin 0 25 mg/hr BID today, then qd starting on 19  · Continue w/ Diltazem gtt at 12 5 mg/hr  · Consider dual-anticoagulation once for secondary prevention once stable  Thank you for your consultation      Physician Requesting Consult: Tc Bautista MD    Reason for Consult / Principal Problem: Atrial fibrillation           Inpatient consult to Cardiology     Performed by  Merle Mendoza DO     Authorized by Tc Bautista MD              HPI: Mariana Mercedes is a 81y o  year old male former smoker with PMH of colon CA, neuralgia and insomnia who presents with left-sided weakness and AMS  Patient was running to the ED via EMS, stroke alert was called  Head CT w/out contrast showed acute to early subacute right MCA territory infarct involving greater than one third of the vascular territory"  Patient seen and examined at bedside  HPI obtained from chart review relatives and patient  Review of Systems  Limited 2/2 mental status, though improvement noted by relative  Historical Information   Past Medical History:   Diagnosis Date    Malignant neoplasm of descending colon ([facility] [address] ) 05/12/2006    Neuralgia     Last Assessed:6/25/13     No past surgical history on file  Social History     Substance and Sexual Activity   Alcohol Use Not Currently    Frequency: Never    Binge frequency: Never     Social History     Substance and Sexual Activity   Drug Use No     Social History     Tobacco Use   Smoking Status Former Smoker   Smokeless Tobacco Never Used     Family History: No family history on file      Meds/Allergies    PTA meds:    Medications Prior to Admission   Medication    mirtazapine (REMERON) 30 mg tablet    QUEtiapine (SEROquel) 100 mg tablet      Allergies   Allergen Reactions    Hydrocodone        Current Facility-Administered Medications:     aspirin rectal suppository 300 mg, 300 mg, Rectal, Daily, Peggy Rojas PA-C    atorvastatin (LIPITOR) tablet 40 mg, 40 mg, Oral, QPM, WYATT Hunter    clopidogrel (PLAVIX) tablet 300 mg, 300 mg, Oral, Once, WYATT Hunter    diltiazem (CARDIZEM) 125 mg in sodium chloride 0 9 % 125 mL infusion, 1-15 mg/hr, Intravenous, Titrated, Cristi Haines MD    heparin (porcine) subcutaneous injection 5,000 Units, 5,000 Units, Subcutaneous, Q8H [address], 5,000 Units at 07/19/19 0528 **AND** [CANCELED] Platelet count, , , Once, WYATT Grijalva    ipratropium (ATROVENT) 0 02 % inhalation solution 0 5 mg, 0 5 mg, Nebulization, Q6H, Cristi Haines MD, 0 5 mg at 07/19/19 0857    Labetalol HCl (NORMODYNE) injection 10 mg, 10 mg, Intravenous, Q6H PRN, WYATT Hunter    levalbuterol (XOPENEX) inhalation solution 1 25 mg, 1 25 mg, Nebulization, Q6H, 1 25 mg at 07/19/19 0857 **AND** sodium chloride 0 9 % inhalation solution 3 mL, 3 mL, Nebulization, Q6H, Cristi Haines MD, 3 mL at 07/19/19 0857    pneumococcal 13-valent conjugate vaccine (PREVNAR-13) IM injection 0 5 mL, 0 5 mL, Intramuscular, Prior to discharge, WYATT Kamara    VTE Pharmacologic Prophylaxis:   Heparin    Objective:   Vitals: Blood pressure 131/70, pulse (!) 128, temperature 97 5 °F (36 4 °C), temperature source Temporal, resp  rate (!) 34, height 5' 6" (1 676 m), weight 81 kg (178 lb 9 2 oz), SpO2 97 %  Body mass index is 28 82 kg/m²  BP Readings from Last 3 Encounters:   07/19/19 131/70   12/18/18 120/70   02/13/18 130/80     Orthostatic Blood Pressures      Most Recent Value   Blood Pressure  131/70 filed at 07/19/2019 0900   Patient Position - Orthostatic VS  Lying filed at 07/19/2019 0745          Intake/Output Summary (Last 24 hours) at 7/19/2019 0928  Last data filed at 7/19/2019 0844  Gross per 24 hour   Intake 3259 17 ml   Output 2170 ml   Net 1089 17 ml       Invasive Devices     Peripheral Intravenous Line            Peripheral IV 07/17/19 Left Hand 1 day    Peripheral IV 07/18/19 Right Forearm less than 1 day          Drain            Urethral Catheter Non-latex 16 Fr  less than 1 day                  Physical Exam:   Physical Exam   Constitutional: He appears well-developed and well-nourished  No distress     HENT:   Head: Normocephalic and atraumatic  Cardiovascular: Normal heart sounds  An irregularly irregular rhythm present  Pulmonary/Chest: Effort normal  No respiratory distress  He has no wheezes  He exhibits no tenderness  Abdominal: Soft  Bowel sounds are normal  He exhibits no distension  There is no tenderness  There is no guarding  A hernia is present  Neurological: He is alert  A cranial nerve deficit is present  Left sided weakness  Gaze to the right         Labs:   Troponins:   Results from last 7 days   Lab Units 07/19/19  0518 07/18/19  0506 07/17/19  1905   CK TOTAL U/L 3,324* 6,896* 5,381*   CK MB INDEX % <1 0 <1 0 <1 0       CBC with diff:   Results from last 7 days   Lab Units 07/19/19 0518 07/18/19  0506 07/17/19  1905   WBC Thousand/uL 10 38* 14 71* 16 18*   HEMOGLOBIN g/dL 10 2* 11 3* 12 3   HEMATOCRIT % 31 6* 35 3* 38 3   MCV fL 95 95 95   PLATELETS Thousands/uL 164 196 213   MCH pg 30 7 30 4 30 6   MCHC g/dL 32 3 32 0 32 1   RDW % 16 7* 16 6* 16 1*   MPV fL 10 1 9 3 9 5   NRBC AUTO /100 WBCs 0 0 0       CMP:   Results from last 7 days   Lab Units 07/19/19 0518 07/18/19  0506 07/17/19  1905   SODIUM mmol/L 144 143 138   POTASSIUM mmol/L 3 7 3 8 4 7   CHLORIDE mmol/L 111* 108 104   CO2 mmol/L 18* 25 24   ANION GAP mmol/L 15* 10 10   BUN mg/dL 19 19 17   CREATININE mg/dL 1 21 1 42* 1 55*   CALCIUM mg/dL 7 7* 7 9* 8 0*   AST U/L 72*  --  78*   ALT U/L 24  --  20   ALK PHOS U/L 68  --  92   TOTAL PROTEIN g/dL 6 4  --  6 7   ALBUMIN g/dL 3 2*  --  3 0*   TOTAL BILIRUBIN mg/dL 0 80  --  0 40   EGFR ml/min/1 73sq m 55 46 41   GLUCOSE RANDOM mg/dL 104 111 132       Magnesium:   Results from last 7 days   Lab Units 07/19/19 0518 07/18/19  0506 07/17/19  1905   MAGNESIUM mg/dL 2 0 2 0 2 0     Coags:   Results from last 7 days   Lab Units 07/17/19  1905   PTT seconds 25   INR  1 05     TSH:    Results from last 7 days   Lab Units 07/17/19  1905   TSH 3RD GENERATON uIU/mL 2 007     Lipid Profile:   Results from last 7 days Lab Units 19  0506   CHOLESTEROL mg/dL 204*   TRIGLYCERIDES mg/dL 83   HDL mg/dL 47   LDL CALC mg/dL 140*     Lipid Profile:   Lab Results   Component Value Date    CHOLESTEROL 204 (H) 2019    HDL 47 2019    LDLCALC 140 (H) 2019    TRIG 83 2019     Hgb A1c:   Results from last 7 days   Lab Units 19  0506   HEMOGLOBIN A1C % 6 0     NT-proBNP:   Recent Labs     19  1905   NTBNP 644*        Imaging & Testing   Cardiac testing:   Results for orders placed during the hospital encounter of 19   Echo complete with contrast if indicated    Narrative [address]  [address] [facility]  [phone]    Transthoracic Echocardiogram  2D, M-mode, Doppler, and Color Doppler    Study date:  2019    Patient: Dina Ruelas  MR number: [id number]  Account number: [de-identified]  : 1935  Age: 81 years  Gender: Male  Status: Inpatient  Location: Bedside  Height: 66 in 66 in  Weight: 177 5 lb 177 5 lb  BP: 92/ 54 mmHg    Indications: Cerebral Thrombosis    Diagnoses: I67 9 - Cerebrovascular disease, unspecified    Sonographer:  ABI Nix  Primary Physician:  Rick Duarte MD  Referring Physician:  WYATT Dawson  Group:  [facility]  Interpreting Physician:  Matthew Hernández MD    SUMMARY    PROCEDURE INFORMATION:  This was a technically difficult study  Echocardiographic views were limited due to restricted patient mobility, poor patient compliance, poor acoustic window availability, and lung interference  LEFT VENTRICLE:  Ejection fraction was estimated to be around 50 %  Although no diagnostic regional wall motion abnormality was identified, this possibility cannot be completely excluded on the basis of this study  Wall thickness was mildly increased  There was mild concentric hypertrophy    Doppler parameters were consistent with abnormal left ventricular relaxation (grade 1 diastolic dysfunction)  ATRIAL SEPTUM:  No Large ASD or large PFO identified by buuble study  Buuble study over all limited  MITRAL VALVE:  There was mild regurgitation  AORTIC VALVE:  Transaortic velocity was increased due to valvular stenosis  There was mild to moderate stenosis  peak velocity 2 33 m/se, Peak gradient 23, mean 11 mm of Hg and DENZEL around 1 35 to 1 4 cm2  TRICUSPID VALVE:  There was mild regurgitation  Estimated peak PA pressure was 35 mmHg  HISTORY: PRIOR HISTORY: Neuralgia, Colon Cancer    PROCEDURE: The procedure was performed at the bedside  This was a routine study  The transthoracic approach was used  The study included complete 2D imaging, M-mode, complete spectral Doppler, and color Doppler  The heart rate was 64 bpm,  at the start of the study  Intravenous contrast (agitated saline, 10 ml) was administered to evaluate shunting  Intravenous contrast ( 10 ml) was administered  Echocardiographic views were limited due to restricted patient mobility, poor  patient compliance, poor acoustic window availability, and lung interference  This was a technically difficult study  LEFT VENTRICLE: Size was normal  Ejection fraction was estimated to be around 50 %  Although no diagnostic regional wall motion abnormality was identified, this possibility cannot be completely excluded on the basis of this study  Wall  thickness was mildly increased  There was mild concentric hypertrophy  DOPPLER: Doppler parameters were consistent with abnormal left ventricular relaxation (grade 1 diastolic dysfunction)  RIGHT VENTRICLE: The size was normal  Systolic function was normal     LEFT ATRIUM: Size was normal     ATRIAL SEPTUM: No Large ASD or large PFO identified by buuble study  Buuble study over all limited  RIGHT ATRIUM: Size was normal     MITRAL VALVE: There was normal leaflet separation  DOPPLER: The transmitral velocity was within the normal range   There was no evidence for stenosis  There was mild regurgitation  AORTIC VALVE: The valve was probably trileaflet  Leaflets exhibited mildly increased thickness, mild calcification, and normal cuspal separation  DOPPLER: Transaortic velocity was increased due to valvular stenosis  There was mild to  moderate stenosis  peak velocity 2 33 m/se, Peak gradient 23, mean 11 mm of Hg and DENZEL around 1 35 to 1 4 cm2  There was no regurgitation  TRICUSPID VALVE: DOPPLER: There was mild regurgitation  Estimated peak PA pressure was 35 mmHg  PULMONIC VALVE: DOPPLER: There was no significant regurgitation  PERICARDIUM: There was no thickening or calcification  There was no pericardial effusion  AORTA: The root exhibited normal size  SYSTEMIC VEINS: IVC: The inferior vena cava was not well visualized  SYSTEM MEASUREMENT TABLES    2D mode  AoR Diam 2D: 3 6 cm  LA Diam (2D): 4 3 cm  LA/Ao (2D): 1 19  FS (2D Teich): 22 4 %  IVSd (2D): 1 24 cm  LVDEV: 124 cmï¾³  LVESV: 68 3 cmï¾³  LVIDd(2D): 5 1 cm  LVISd (2D): 3 96 cm  LVOT Area 2D: 3 14 cmï¾²  LVPWd (2D): 1 19 cm  SV (Teich): 55 7 cmï¾³    Apical four chamber  LVEF A4C: 45 %    Unspecified Scan Mode  DENZEL Cont Eq (Peak Patrick): 1 23 cmï¾²  DENZEL Cont Eq (VTI): 1 35 cmï¾²  LVOT (VTI): 18 8 cm  LVOT Diam : 2 cm  LVOT Vmax: 928 mm/s  LVOT Vmax; Mean: 902 mm/s  Peak Grad ; Mean: 3 mm[Hg]  SV (LVOT): 62 cmï¾³  VTI;Mean: 2 mm[Hg]  MV Peak A Patrick: 607 mm/s  MV Peak E Patrick  Mean: 408 mm/s  MVA (PHT): 3 67 cmï¾²  PHT: 60 ms  Max P mm[Hg]  V Max: 2780 mm/s  Vmax: 2780 mm/s  RA Area: 19 8 cmï¾²  RA Volume: 58 5 cmï¾³  TAPSE: 2 1 cm    [employer] Accredited Echocardiography Laboratory    Prepared and electronically signed by    Norma Nicole MD  Signed 2019 11:00:17         Imaging: I have personally reviewed pertinent reports  Xr Chest 1 View Portable    Result Date: 2019  Narrative: CHEST INDICATION:   htn    Stroke COMPARISON:  None EXAM PERFORMED/VIEWS:  XR CHEST PORTABLE 1 image FINDINGS: Cardiomediastinal silhouette appears unremarkable  The lungs are clear  No pneumothorax or pleural effusion  Osseous structures appear within normal limits for patient age  Impression: No acute cardiopulmonary disease  Workstation performed: [id number]     Mri Brain Wo Contrast    Result Date: 7/18/2019  Narrative: MRI BRAIN WITHOUT CONTRAST INDICATION: right MCA infarct  COMPARISON:   7/17/2019 CT/CTA TECHNIQUE:  Sagittal T1, axial T2, axial FLAIR, axial T1, axial [place] and axial diffusion imaging  IMAGE QUALITY:  Diagnostic  FINDINGS: BRAIN PARENCHYMA: A large region of acute to subacute infarction involves the principally the perisylvian portion of the right temporal lobe with less pronounced involvement of the adjacent inferior right frontal and anterior right parietal lobes  There is contiguous acute involvement of the head of the right caudate nucleus and corona radiata  Mild edema and mass effect with mild right lateral ventricular compression  Findings are consistent with CT  No additional acute ischemia identified  Mild microangiopathic  ischemic changes involve deep white matter elsewhere  Development of region of acute hemorrhage measuring approximately 1 cm x 1 5 cm acute microhemorrhage within the right lenticular nucleus and to lesser degree adjacent temporal lobe  There is no discrete mass, or midline shift  VENTRICLES:  The ventricles are normal in size and contour  SELLA AND PITUITARY GLAND:  Normal  ORBITS:  Normal  PARANASAL SINUSES:  Normal  VASCULATURE: Poorly visualized right middle cerebral artery CALVARIUM AND SKULL BASE:  Normal  EXTRACRANIAL SOFT TISSUES:  Normal      Impression: Large acute right MCA territory infarction primarily involving the temporal lobe with lesser degree of involvement of the inferolateral frontal lobe and anterior parietal lobe    Consistent with CT/CTA Development of acute microhemorrhage within the right lenticular nucleus and adjacent temporal lobe Findings personally discussed by phone with Dr Nels Seip at 3:10 PM, 7/18/2019 Workstation performed: [id number]     Ct Stroke Alert Brain    Result Date: 7/17/2019  Narrative: CT BRAIN - STROKE ALERT PROTOCOL INDICATION:   Acute stroke  COMPARISON:  None  TECHNIQUE:  CT examination of the brain was performed  In addition to axial images, coronal reformatted images were created and submitted for interpretation  Radiation dose length product (DLP) for this visit:  1017 2 mGy-cm   This examination, like all CT scans performed in the [facility], was performed utilizing techniques to minimize radiation dose exposure, including the use of iterative  reconstruction and automated exposure control  IMAGE QUALITY:  Diagnostic  FINDINGS:  PARENCHYMA:  Cortical and subcortical hypoattenuation with sulcal effacement in the right frontoparietal temporal region, also involving the posterior insula, right basal ganglia and external capsule  No evidence of intracranial hemorrhage  No midline shift  Periventricular and subcortical hypoattenuating foci consistent with microangiopathic disease  Increased density in right M1 and proximal M2 branches likely represent thrombus  VENTRICLES AND EXTRA-AXIAL SPACES:  No hydrocephalus or extra-axial collection  VISUALIZED ORBITS AND PARANASAL SINUSES:  Intact globes and orbits  Chronic paranasal sinus disease  CALVARIUM AND EXTRACRANIAL SOFT TISSUES:  No lytic or blastic lesion or soft tissue mass  Impression: 1  Acute to early subacute right MCA territory infarct involving greater than one third of the vascular territory  No evidence of hemorrhagic conversion or significant mass effect  2   Hyperdense right M1 and M2 branches suggesting acute thrombus   Findings were directly discussed with Deniz Osei on 7/17/2019 7:16 PM  Workstation performed: [id number]     Xr Hips Bilateral 3-4 Vw W Pelvis If Performed    Result Date: 7/18/2019  Narrative: BILATERAL HIPS AND PELVIS INDICATION:   fall  COMPARISON:  None VIEWS:  XR HIPS BILATERAL 3-4 VW W PELVIS IF PERFORMED  FINDINGS: Possible nondisplaced right superior pubic ramus fracture  Correlate for point tenderness  Visualized lower lumbar spine is unremarkable  LEFT HIP: No significant hip degenerative changes  Joint space alignment is maintained  Soft tissues are unremarkable  RIGHT HIP: No significant hip degenerative changes  Joint space alignment is maintained  Soft tissues are unremarkable  Impression: Possible nondisplaced right superior pubic ramus fracture  Correlate for point tenderness  Workstation performed: [id number]     Cta Stroke Alert (head/neck)    Result Date: 7/17/2019  Narrative: CTA NECK AND BRAIN WITH AND WITHOUT CONTRAST INDICATION: Acute stroke  COMPARISON:   7/17/2019  TECHNIQUE:  Post contrast imaging was performed after administration of iodinated contrast through the neck and brain  Post contrast axial 0 625 mm images timed to opacify the arterial system  3D rendering was performed on an independent workstation  MIP reconstructions performed  Coronal reconstructions were performed of the noncontrast portion of the brain  Radiation dose length product (DLP) for this visit:  353 18 mGy-cm   This examination, like all CT scans performed in the [facility], was performed utilizing techniques to minimize radiation dose exposure, including the use of iterative  reconstruction and automated exposure control  IV Contrast:  85 mL of iohexol (OMNIPAQUE)  IMAGE QUALITY:   Motion artifact results in suboptimal evaluation FINDINGS: CERVICAL VASCULATURE AORTIC ARCH AND GREAT VESSELS:  Three-vessel configuration aortic arch  Great vessel origins not well evaluated due to motion artifact  No significant stenosis in the subclavian arteries  RIGHT VERTEBRAL ARTERY CERVICAL SEGMENT:  Normal origin   The vessel is normal in caliber throughout the neck  LEFT VERTEBRAL ARTERY CERVICAL SEGMENT:  Normal origin  The vessel is normal in caliber throughout the neck  RIGHT EXTRACRANIAL CAROTID SEGMENT:  Normal caliber common carotid artery  Calcified plaque at the bifurcation and ICA origin  Severe (70-90%) stenosis at the ICA origin  Decreased caliber of the vessel throughout the cervical segment with intimal thickening  LEFT EXTRACRANIAL CAROTID SEGMENT:  Normal caliber common carotid artery  Calcified plaque the bifurcation and ICA origin resulting in mild to moderate (50-60%) stenosis  NASCET criteria was used to determine the degree of internal carotid artery diameter stenosis  INTRACRANIAL VASCULATURE INTERNAL CAROTID ARTERIES:  Diminutive right carotid siphon likely secondary to severe proximal stenosis  Calcified plaque throughout the left carotid siphon without significant stenosis  Patent ophthalmic arteries  ANTERIOR CIRCULATION:  Absent or severely hypoplastic right A1 segment  Anterior to indicating artery visualized  No stenosis in the anterior cerebral arteries  MIDDLE CEREBRAL ARTERY CIRCULATION: Absence of opacification of the proximal right middle cerebral artery  Minimal enhancement of distal M3 branches suggesting poor collateral flow  No stenosis in the proximal right middle cerebral artery  DISTAL VERTEBRAL ARTERIES:  Normal distal vertebral arteries  Posterior inferior cerebellar artery origins are normal  Normal vertebral basilar junction  BASILAR ARTERY:  Basilar artery is normal in caliber  Normal superior cerebellar arteries  POSTERIOR CEREBRAL ARTERIES: Both posterior cerebral arteries arises from the basilar tip  Both arteries demonstrate normal enhancement  Tiny bilateral posterior communicating arteries  DURAL VENOUS SINUSES:  Limited evaluation without evidence of or thrombosis  NON VASCULAR ANATOMY BONY STRUCTURES:  No acute osseous abnormality  SOFT TISSUES OF THE NECK:  Normal  THORACIC INLET:  Unremarkable  Impression: 1  Severe (70-90%) stenosis at the origin of the right internal carotid artery  Remainder of the vessel is diminutive likely secondary to proximal stenosis  2   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  Very minimal opacification of distal M3 branches suggesting poor collateral flow  I personally discussed this study with Deniz Osei on 7/17/2019 at 7:14 PM  Workstation performed: [id number]     EKG/ Monitor: Personally reviewed  Afib     Code Status: Level 3 - DNAR and DNI    Paulett Lefort, DO        "This note has been constructed using a voice recognition system  Therefore there may be syntax, spelling, and/or grammatical errors   Please call if you have any questions  "

## 2019-07-19 NOTE — PLAN OF CARE
Problem: RESPIRATORY - ADULT  Goal: Achieves optimal ventilation and oxygenation  Description  INTERVENTIONS:  - Assess for changes in respiratory status  - Assess for changes in mentation and behavior  - Position to facilitate oxygenation and minimize respiratory effort  - Oxygen administration by appropriate delivery method based on oxygen saturation (per order) or ABGs  - Initiate smoking cessation education as indicated  - Encourage broncho-pulmonary hygiene including cough, deep breathe, Incentive Spirometry  - Assess the need for suctioning and aspirate as needed  - Assess and instruct to report SOB or any respiratory difficulty  - Respiratory Therapy support as indicated  Outcome: Progressing     Problem: GENITOURINARY - ADULT  Goal: Urinary catheter remains patent  Description  INTERVENTIONS:  - Assess patency of urinary catheter  - If patient has a chronic meier, consider changing catheter if non-functioning  - Follow guidelines for intermittent irrigation of non-functioning urinary catheter  Outcome: Progressing

## 2019-07-19 NOTE — PROGRESS NOTES
[facility] Progress Note  Patient: Jose Knott 81 y o  male   MRN: [id number]  PCP: Carolyn Henriquez MD  Unit/Bed#: ICU 12 Encounter: [id number]  Date Of Visit: 07/19/19    Problem List:    Principal Problem:    Cerebrovascular accident (CVA) due to thrombosis of right middle cerebral artery ([address] )  Active Problems:    Atrial fibrillation with RVR ([address] )    Traumatic rhabdomyolysis ([address] )    Closed fracture of right superior pubic ramus ([address] )    Urinary retention    Acute kidney injury ([address] )    Leukocytosis    Insomnia    Depression with anxiety    Impaired swallowing      Assessment & Plan:    Atrial fibrillation with RVR ([address] )  Assessment & Plan  New onset  Noted to be in new onset rapid AFib with associated tachypnea  Will check stat chest x-ray, EKG  Will start Cardizem drip  Cardiology evaluation  Discussed with Cardiology and Neurology, patient will be started on digoxin  Attempt to allow permissive hypertension in setting of AFib  Follow up repeat CT head in a m  Prior to consideration of anticoagulation  Continue IV fluid, monitor intake and output    * Cerebrovascular accident (CVA) due to thrombosis of right middle cerebral artery [facility])  Assessment & Plan  Patient brought in by EMS after being found on floor with altered mental status by his step-daughter  Patient was last seen normal on Monday, patient was then found down on the bathroom floor with left-sided weakness and altered mentation 7/17,   Patient was a stroke alert  NIH scale 13 on admission  Patient displayed right sided gaze preference, left sided weakness, left sided facial droop  Speech incoherent  CT head, CTA head neck revealed "Severe (70-90%) stenosis at the origin of the right internal carotid artery   Remainder of the vessel is diminutive likely secondary to proximal stenosis   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   Very minimal opacification of distal M3 branches suggesting poor collateral flow"  Endovascular Neurology was contacted by ED, who declined intervention given age and unknown onset   Dr Crista Carranza with Neurology was contacted, patient not a tPA candidate  Recommended 300mg rectal ASA and stroke pathway with close neurologic monitoring overnight given large infarct with possibility of swelling as timing of infarct is unknown  MRI of the brain - Large acute right MCA territory infarction primarily involving the temporal lobe with lesser degree of involvement of the inferolateral frontal lobe and anterior parietal lobe   Development of acute microhemorrhage within the right lenticular nucleus and adjacent temporal lobe  2D echo-EF 61%, grade 1 diastolic dysfunction  No shunt noted  , hemoglobin A1c 6  Allow permissive hypertension with SBP goal 130-140  Hold sedating medications  Repeat CT head tomorrow to evaluate for stability, CT scan PRN with neurologic changes  NPO until passes swallow therapy  Continue rectal aspirin  Lipitor 40 mg daily for now, follow-up CPK  PT/OT/ST  Vascular surgery consultation noted, recommended follow-up for carotid endarterectomy versus stent  Patient will need anticoagulation in light of AFib      Acute kidney injury ([facility] [address] )  Assessment & Plan  Creatinine 1 5 on presentation  Likely secondary to prerenal, rhabdomyolysis, urinary retention  Improving with IV fluids  Continue to monitor intake and output    Urinary retention  Assessment & Plan  Bladder scan noted to have more than 800 cc of urinary retention  Setting of CVA, a KI, rhabdomyolysis, pelvic fracture  Cohen catheter was placed  Voiding trial when ambulating    Closed fracture of right superior pubic ramus [facility])  Assessment & Plan  Patient reported pain on the right groin    X-ray revealed possible nondisplaced right superior pubic ramus fracture  Likely related to patient's fall  Will get orthopedic evaluation    Traumatic rhabdomyolysis [facility])  Assessment & Plan  Patient was found lying in the floor for prolonged preop  CPK noted to be 5381 on admission, increase to 6896 now and increasing  Continue IV fluids, monitor renal function, intake and output  PT/OT as tolerated    Leukocytosis  Assessment & Plan  Presented with leukocytosis of 16 K  Likely reactive  Chest x-ray without any acute infiltrate, UA without evidence of UTI  Blood cultures pending  Improving    Elevated lactic acid levelresolved as of 2019  Assessment & Plan  Lactic acid 3 3 on presentation, subsequently improved with IV fluids    Depression with anxiety  Assessment & Plan  Hold home medication mirtazapine and seroquel    Insomnia  Assessment & Plan  Hold home medication mirtazapine and seroquel        VTE Pharmacologic Prophylaxis:   Pharmacologic: Heparin  Mechanical VTE Prophylaxis in Place: Yes    Patient Centered Rounds: I have performed bedside rounds with nursing staff today  Discussions with Specialists or Other Care Team Provider:  Dr Deepti Rocha, Dr Yolette Motta, orthopnea, ICU team    Education and Discussions with Family / Patient:  Various family members at the bedside    Time Spent for Care: 45 minutes  More than 50% of total time spent on counseling and coordination of care as described above      Current Length of Stay: 2 day(s)    Current Patient Status: Inpatient   Certification Statement: The patient will continue to require additional inpatient hospital stay due to Acute CVA, AFib    Discharge Plan:  Anticipate rehab    Code Status: Level 3 - DNAR and DNI      Subjective:   Noted to be in new onset uncontrolled AFib today morning with associated tachypnea  Blood pressure was stable  Patient was seen and examined  Denies any chest pain or shortness of breath  More alert, Neuro exam stable    Objective:   Mild respiratory distress  Vitals:   Temp (24hrs), Av 9 °F (36 6 °C), Min:97 4 °F (36 3 °C), Max:98 5 °F (36 9 °C)    Temp:  [97 4 °F (36 3 °C)-98 5 °F (36 9 °C)] 97 5 °F (36 4 °C)  HR:  [] 128  Resp:  [28-81] 34  BP: (119-160)/(59-82) 131/70  SpO2:  [93 %-99 %] 97 %  Body mass index is 28 82 kg/m²  Input and Output Summary (last 24 hours): Intake/Output Summary (Last 24 hours) at 7/19/2019 0928  Last data filed at 7/19/2019 0844  Gross per 24 hour   Intake 3259 17 ml   Output 2170 ml   Net 1089 17 ml       Physical Exam:     Physical Exam   Constitutional: He appears well-developed  No distress  HENT:   Head: Normocephalic and atraumatic  Eyes: Pupils are equal, round, and reactive to light  Conjunctivae are normal    Neck: Normal range of motion  Neck supple  Cardiovascular: An irregularly irregular rhythm present  Tachycardia present  Murmur heard  Pulmonary/Chest: Effort normal  No respiratory distress  He has no wheezes  He has no rhonchi  He has rales (At bases)  He exhibits no tenderness  Tachypneic   Abdominal: Soft  Bowel sounds are normal  He exhibits no distension  There is no tenderness  There is no rebound and no guarding  Musculoskeletal: He exhibits no edema  Right hip: He exhibits tenderness  Neurological: He is alert  He is not disoriented  Left-sided hemiplegia, left left-sided sensory loss  Mild dysarthria, dysphagia   Skin: Skin is warm and dry  No rash noted  Additional Data:     Labs:    Results from last 7 days   Lab Units 07/19/19  0518   WBC Thousand/uL 10 38*   HEMOGLOBIN g/dL 10 2*   HEMATOCRIT % 31 6*   PLATELETS Thousands/uL 164   NEUTROS PCT % 71   LYMPHS PCT % 16   MONOS PCT % 12   EOS PCT % 0     Results from last 7 days   Lab Units 07/19/19  0518   POTASSIUM mmol/L 3 7   CHLORIDE mmol/L 111*   CO2 mmol/L 18*   BUN mg/dL 19   CREATININE mg/dL 1 21   CALCIUM mg/dL 7 7*   ALK PHOS U/L 68   ALT U/L 24   AST U/L 72*     Results from last 7 days   Lab Units 07/17/19  1905   INR  1 05       * I Have Reviewed All Lab Data Listed Above  * Additional Pertinent Lab Tests Reviewed:  All Labs Within Last 24 Hours Reviewed      Imaging:  Imaging Reports Reviewed Today Include:  Chest x-ray, CT      Recent Cultures (last 7 days):           Last 24 Hours Medication List:     Current Facility-Administered Medications:  aspirin 300 mg Rectal Daily Arabella Lea PA-C   atorvastatin 40 mg Oral QPM WYATT Hunter   clopidogrel 300 mg Oral Once WYATT Hunter   diltiazem 1-15 mg/hr Intravenous Titrated Norbert Nolasco MD   heparin (porcine) 5,000 Units Subcutaneous [facility] WYATT Hunter   ipratropium 0 5 mg Nebulization Q6H Norbert Nolasco MD   Labetalol HCl 10 mg Intravenous Q6H PRN WYATT Hunter   levalbuterol 1 25 mg Nebulization Q6H Norbert Nolasco MD   And       sodium chloride 3 mL Nebulization Q6H Norbert Nolasco MD   pneumococcal 13-valent conjugate vaccine 0 5 mL Intramuscular Prior to discharge WYATT Lakhani          Today, Patient Was Seen By: Norbert Nolasco MD    ** Please Note: "This note has been constructed using a voice recognition system  Therefore there may be syntax, spelling, and/or grammatical errors   Please call if you have any questions  "**

## 2019-07-19 NOTE — RESPIRATORY THERAPY NOTE
RT Protocol Note  Marley King Sr 81 y o  male MRN: [id number]  Unit/Bed#: ICU 12 Encounter: [id number]    Assessment    Principal Problem:    Cerebrovascular accident (CVA) due to thrombosis of right middle cerebral artery (HCC)  Active Problems:    Insomnia    Elevated lactic acid level    Abnormal CK    Elevated serum creatinine    Leukocytosis    Depression with anxiety    Closed fracture of right superior pubic ramus (HCC)    Urinary retention    Impaired swallowing      Home Pulmonary Medications:  NONE       Past Medical History:   Diagnosis Date    Malignant neoplasm of descending colon ([facility] [address] ) 05/12/2006    Neuralgia     Last Assessed:6/25/13     Social History     Socioeconomic History    Marital status:       Spouse name: Not on file    Number of children: Not on file    Years of education: Not on file    Highest education level: Not on file   Occupational History    Not on file   Social Needs    Financial resource strain: Not on file    Food insecurity:     Worry: Not on file     Inability: Not on file    Transportation needs:     Medical: Not on file     Non-medical: Not on file   Tobacco Use    Smoking status: Former Smoker    Smokeless tobacco: Never Used   Substance and Sexual Activity    Alcohol use: Not Currently     Frequency: Never     Binge frequency: Never    Drug use: No    Sexual activity: Not on file   Lifestyle    Physical activity:     Days per week: Not on file     Minutes per session: Not on file    Stress: Not on file   Relationships    Social connections:     Talks on phone: Not on file     Gets together: Not on file     Attends Yazidism service: Not on file     Active member of club or organization: Not on file     Attends meetings of clubs or organizations: Not on file     Relationship status: Not on file    Intimate partner violence:     Fear of current or ex partner: Not on file     Emotionally abused: Not on file     Physically abused: Not on file     Forced sexual activity: Not on file   Other Topics Concern    Not on file   Social History Narrative    Not on file       Subjective         Objective    Physical Exam:   Assessment Type: Assess only  General Appearance: Sleeping  Respiratory Pattern: Tachypneic  Chest Assessment: Chest expansion symmetrical  Bilateral Breath Sounds: Rhonchi  O2 Device: NC    Vitals:  Blood pressure 139/75, pulse 68, temperature 98 4 °F (36 9 °C), temperature source Temporal, resp  rate (!) 33, height 5' 6" (1 676 m), weight 81 kg (178 lb 9 2 oz), SpO2 95 %          O2 Device: NC     Plan    Respiratory Plan: Vent/NIV/HFNC, Mild Distress pathway        Resp Comments: pt sleeping- Bipap standby

## 2019-07-19 NOTE — CONSULTS
Consultation - Jaya Guzman Sr 81 y o  male MRN: [id number]  Unit/Bed#: ICU 12 Encounter: [id number]      Assessment/Plan     Assessment:  1) Right Hip Pain - x-ray shows possible signs of pubic rami fracture, currently difficult to distinguish based on x-rays, patient's clinical correlation appears to be reflecting more of hip pain based on both palpation and passive range of motion, concern for possible undiagnosed hip fracture is present, based on history patient may have had fall in bathroom but is uncertain because he cannot remember due to CVA incident, MRI pending, no over fracture noted     Plan:  1) Right Hip Pain  - physical exam performed of right lower extremity  - reviewed x-rays  - MRI of right pelvis/hip for evaluation of bony pelvis  - management of CVA per Internal Medicine team in conjunction with Neurology  - p r n  Analgesia for right hip  - shift patient Q2  - monitor neurovascular status with change of shifts  - monitor cranial nerve exam/neurologic exam with changes shifts  - continue anticoagulation and anti-platelet therapies per Neurology recommendations for CVA    History of Present Illness   Physician Requesting Consult: Martha Valenzuela MD  Reason for Consult / Principal Problem: Right hip pain  HPI: Renee Covarrubias is a 81y o  year old male with a past medical history pertinent for previous colorectal cancer, neuralgia, COPD, smoking history, possible fall, right MCA ischemic CVA, right ICA stenosis presenting to the acute care orthopedic team due to right hip pain  Patient approximately 3 days ago was mowing his lawn and felt lightheaded, dizzy, short of breath  Patient does have COPD and thought he was having an exacerbation  Patient went inside and tried to lay down on the couch and then go to the bathroom  Patient went attempting to get to the bathroom was found on the floor but he does not know how hard or if he fell  Family was suggesting patient had fallen  Patient was having a significant right MCA ischemic CVA  Which was diagnosed in the ED with CVA protocol  Patient was being treated from a neurologic, vascular, and critical care perspective  Patient as he became more alert and oriented and was able to regain speech began to complain of right-sided hip pain  Patient notices the pain at rest but also with manipulation of the hip and passive range of motion  Patient also has pain with palpation of the right hip  Patient denies any radiculopathy  Patient denies any numbness, tingling, lack of motor movement in the distal right lower extremity  Patient has some degree of weakness due to the tenderness in his right hip and cannot keep his leg straight  Patient does not deny nor can't exclude trauma to the head  Patient prior to an injury and CVA was ambulatory  Inpatient consult to Orthopedic Surgery  Consult performed by: Skye Cano PA-C  Consult ordered by: Kathy Hernandez MD          Review of Systems   Constitutional: Negative for appetite change, chills, fatigue, fever and unexpected weight change  Respiratory: Positive for cough, chest tightness and shortness of breath  Negative for wheezing  Cardiovascular: Negative for chest pain and palpitations  Gastrointestinal: Negative for abdominal distention, abdominal pain, constipation, diarrhea, nausea and vomiting  Genitourinary: Negative for difficulty urinating and dysuria  Musculoskeletal: Positive for arthralgias, back pain and gait problem  Skin: Negative for color change and wound  Allergic/Immunologic: Negative for food allergies and immunocompromised state  Neurological: Positive for facial asymmetry, speech difficulty, weakness and numbness  Negative for dizziness and light-headedness  Psychiatric/Behavioral: Negative for agitation and confusion  The patient is not nervous/anxious          Historical Information   Past Medical History:   Diagnosis Date    Malignant neoplasm of descending colon ([facility] [address] ) 05/12/2006    Neuralgia     Last Assessed:6/25/13     No past surgical history on file    Social History   Social History     Substance and Sexual Activity   Alcohol Use Not Currently    Frequency: Never    Binge frequency: Never     Social History     Substance and Sexual Activity   Drug Use No     Social History     Tobacco Use   Smoking Status Former Smoker   Smokeless Tobacco Never Used     Family History: non-contributory    Meds/Allergies   all current active meds have been reviewed and current meds:   Current Facility-Administered Medications   Medication Dose Route Frequency    acetaminophen (TYLENOL) rectal suppository 325 mg  325 mg Rectal Q4H PRN    aspirin rectal suppository 300 mg  300 mg Rectal Daily    atorvastatin (LIPITOR) tablet 40 mg  40 mg Oral QPM    clopidogrel (PLAVIX) tablet 300 mg  300 mg Oral Once    digoxin (LANOXIN) injection 62 5 mcg  62 5 mcg Intravenous Daily    digoxin (LANOXIN) injection 62 5 mcg  62 5 mcg Intravenous Once    diltiazem (CARDIZEM) 125 mg in sodium chloride 0 9 % 125 mL infusion  1-15 mg/hr Intravenous Titrated    heparin (porcine) subcutaneous injection 5,000 Units  5,000 Units Subcutaneous Q8H [address]    HYDROmorphone (DILAUDID) injection 0 2 mg  0 2 mg Intravenous Q4H PRN    ipratropium (ATROVENT) 0 02 % inhalation solution 0 5 mg  0 5 mg Nebulization Q6H    Labetalol HCl (NORMODYNE) injection 10 mg  10 mg Intravenous Q6H PRN    levalbuterol (XOPENEX) inhalation solution 1 25 mg  1 25 mg Nebulization Q6H    And    sodium chloride 0 9 % inhalation solution 3 mL  3 mL Nebulization Q6H    pneumococcal 13-valent conjugate vaccine (PREVNAR-13) IM injection 0 5 mL  0 5 mL Intramuscular Prior to discharge    sodium chloride 0 9 % infusion  125 mL/hr Intravenous Continuous     Allergies   Allergen Reactions    Hydrocodone        Objective   Vitals: Blood pressure 90/50, pulse (!) 129, temperature 97 5 °F (36 4 °C), temperature source Temporal, resp  rate (!) 37, height 5' 6" (1 676 m), weight 81 kg (178 lb 9 2 oz), SpO2 94 %  ,Body mass index is 28 82 kg/m²  Intake/Output Summary (Last 24 hours) at 7/19/2019 1456  Last data filed at 7/19/2019 1301  Gross per 24 hour   Intake 3617 5 ml   Output 2535 ml   Net 1082 5 ml     I/O last 24 hours: In: 3617 5 [I V :3117 5; IV Piggyback:500]  Out: 3735 [Urine:3735]    Invasive Devices     Peripheral Intravenous Line            Peripheral IV 07/17/19 Left Hand 1 day    Peripheral IV 07/18/19 Right Forearm less than 1 day          Drain            Urethral Catheter Non-latex 16 Fr  1 day                Physical Exam   Constitutional: He is oriented to person, place, and time  He is cooperative  He does not have a sickly appearance  He appears ill  No distress  He is not intubated  HENT:   Head: Normocephalic and atraumatic  Right Ear: Hearing and external ear normal    Left Ear: Hearing and external ear normal    Nose: Nose normal    Cardiovascular: Tachycardia present  Pulmonary/Chest: Accessory muscle usage present  No stridor  Tachypnea noted  No apnea and no bradypnea  He is not intubated  No respiratory distress  He has decreased breath sounds  He has no wheezes  He has no rhonchi  He has rales  Abdominal: Soft  There is no tenderness  Musculoskeletal:        Right hip: He exhibits decreased range of motion, decreased strength, tenderness and bony tenderness  He exhibits no swelling, no crepitus, no deformity and no laceration  Right foot: There is normal range of motion and no deformity  Left foot: There is decreased range of motion (very slightly decrease AROM)  There is no deformity  Feet:   Right Foot:   Protective Sensation: 10 sites tested  10 sites sensed  Skin Integrity: Right foot maceration: heels  Left Foot:   Protective Sensation: 10 sites tested  10 sites sensed  Neurological: He is alert and oriented to person, place, and time   He is not disoriented  A cranial nerve deficit and sensory deficit is present  He exhibits abnormal muscle tone  Skin: Skin is warm, dry and intact  Right Ankle Exam   Right ankle exam is normal     Tenderness   The patient is experiencing no tenderness  Swelling: none    Range of Motion   The patient has normal right ankle ROM  Muscle Strength   The patient has normal right ankle strength  Dorsiflexion:  5/5  Plantar flexion:  5/5  Anterior tibial:  5/5  Posterior tibial:  5/5  Gastrocsoleus:  5/5  Peroneal muscle:  5/5    Other   Erythema: absent  Sensation: normal       Left Ankle Exam   Left ankle exam is normal     Tenderness   The patient is experiencing no tenderness  Swelling: none    Range of Motion   The patient has normal left ankle ROM  Muscle Strength   Dorsiflexion:  4/5   Plantar flexion:  4/5   Anterior tibial:  4/5   Posterior tibial:  4/5  Gastrocsoleus:  4/5  Peroneal muscle:  4/5    Other   Erythema: absent  Sensation: normal      Right Knee Exam     Tenderness   The patient is experiencing no tenderness  Range of Motion   The patient has normal right knee ROM  Other   Erythema: absent  Sensation: normal  Swelling: none    Comments:  Decreased strength due to tenderness in right hip      Left Knee Exam     Tenderness   The patient is experiencing no tenderness  Range of Motion   The patient has normal left knee ROM  Other   Erythema: absent  Sensation: normal  Swelling: none      Right Hip Exam     Tenderness   The patient is experiencing tenderness in the lateral, anterior and ischial tuberosity      Range of Motion   Abduction:  20 (limited due to tenderness in Right hip and gaurding) abnormal   Adduction:  10 abnormal   Extension:  0 abnormal   Flexion: 50   External rotation: 30   Internal rotation: 5     Muscle Strength   Abduction: 3/5   Adduction: 3/5   Flexion: 2/5     Other   Erythema: absent  Sensation: normal    Comments:  Bony Tenderness of Right Hip  Negative Log Roll   Negative Heel Strike  Can't Straight Leg Raise      Left Hip Exam     Tenderness   The patient is experiencing no tenderness  Range of Motion   Abduction: abnormal Left hip abduction: limited due to Right MCA CVA  Adduction: abnormal   Extension: abnormal   Flexion: abnormal   External rotation: abnormal   Internal rotation: abnormal     Muscle Strength   Left hip normal muscle strength: limited due to Right MCA CVA  Other   Erythema: absent  Sensation: normal            Lab Results:   I have personally reviewed pertinent lab results  CBC:   Lab Results   Component Value Date    WBC 10 38 (H) 07/19/2019    HGB 10 2 (L) 07/19/2019    HCT 31 6 (L) 07/19/2019    MCV 95 07/19/2019     07/19/2019    MCH 30 7 07/19/2019    MCHC 32 3 07/19/2019    RDW 16 7 (H) 07/19/2019    MPV 10 1 07/19/2019    NRBC 0 07/19/2019     CMP:   Lab Results   Component Value Date    SODIUM 144 07/19/2019     (H) 07/19/2019    CO2 18 (L) 07/19/2019    BUN 19 07/19/2019    CREATININE 1 21 07/19/2019    CALCIUM 7 7 (L) 07/19/2019    AST 72 (H) 07/19/2019    ALT 24 07/19/2019    ALKPHOS 68 07/19/2019    EGFR 55 07/19/2019     Imaging Studies: I have personally reviewed pertinent reports  EKG, Pathology, and Other Studies: I have personally reviewed pertinent reports  VTE Prophylaxis: Heparin    Code Status: Level 3 - DNAR and DNI    Counseling / Coordination of Care  Total floor / unit time spent today 45 minutes  Greater than 50% of total time was spent with the patient and / or family counseling and / or coordination of care  A description of the counseling / coordination of care:  Developing plan, reviewing with attending, coordinating care, reviewing history, reviewing labs, physical exam, reviewing imaging

## 2019-07-19 NOTE — SOCIAL WORK
LOS - 2 days    SW met with pt and stepdaughter to assess needs and discuss plans  Discussed goals of making sure pt's needs are met upon discharge  Pt was admitted with CVA  Prior to admission pt was totally independent  Lives alone, cuts grass, drives  No DME needs  No HHC  Pt's stepdaughter lives about 10 minutes from pt  Son also lives in area  Pt's PCP is Dr Nely Aburto MD    [facility]erred [facility]  Per pt his prescriptions are covered under [employer]  Pt has no secondary insurance  Per pt his stepdaughter, Laurie Gutierrez, is his POA  Stepdaughter said they have paperwork at home  Requested copy for chart if possible  STR placement will be necessary upon discharge  SW discussed plans and facility options with pt and stepdaughter  Also discussed enrollment in Medicare Bundle Program   Provided Bundle Program Notification Letter  Provided information on Medicare Bundle Program, shared list of preferred rehab providers and discussed ELOS  Also provided information on Medicaid in the event pt's rehab extends or long term care is needed  EARC-PAS process will need to be completed prior to transfer as well  Stepdaughter's facility preference is [facility], specifically [address] if possible  Alternate choices are [facility], then [facility]  Referral will be made to [facility] to begin planning  Offered ongoing assistance  SW will follow to monitor progress and assist with planning as needed

## 2019-07-19 NOTE — PLAN OF CARE
Problem: OCCUPATIONAL THERAPY ADULT  Goal: Performs self-care activities at highest level of function for planned discharge setting  See evaluation for individualized goals  Outcome: Progressing  Note:   Limitation: Decreased ADL status, Decreased UE strength, Decreased Safe judgement during ADL, Decreased endurance, Decreased self-care trans, Decreased high-level ADLs, Decreased cognition, Decreased UE ROM, Decreased fine motor control, Non-func L UE, Visual deficit(decreased balance and mobility )  Prognosis: Fair  Assessment: Patient is cooperative and alert  Patient able to follow 1 step commands with increased time/repitition today  No active movement or muscle contration noted in LUE  Patient is more alert and able to answer questions today  Patient now with R pubic ramus fracture  Patient with left neglect, encouraged to turn head and look to left        OT Discharge Recommendation: Short Term Rehab

## 2019-07-19 NOTE — PLAN OF CARE
Problem: Potential for Falls  Goal: Patient will remain free of falls  Description  INTERVENTIONS:  - Assess patient frequently for physical needs  -  Identify cognitive and physical deficits and behaviors that affect risk of falls  -  [place] fall precautions as indicated by assessment   - Educate patient/family on patient safety including physical limitations  - Instruct patient to call for assistance with activity based on assessment  - Modify environment to reduce risk of injury  - Consider OT/PT consult to assist with strengthening/mobility  Outcome: Progressing     Problem: Prexisting or High Potential for Compromised Skin Integrity  Goal: Skin integrity is maintained or improved  Description  INTERVENTIONS:  - Identify patients at risk for skin breakdown  - Assess and monitor skin integrity  - Assess and monitor nutrition and hydration status  - Monitor labs (i e  albumin)  - Assess for incontinence   - Turn and reposition patient  - Assist with mobility/ambulation  - Relieve pressure over bony prominences  - Avoid friction and shearing  - Provide appropriate hygiene as needed including keeping skin clean and dry  - Evaluate need for skin moisturizer/barrier cream  - Collaborate with interdisciplinary team (i e  Nutrition, Rehabilitation, etc )   - Patient/family teaching  Outcome: Progressing     Problem: Neurological Deficit  Goal: Neurological status is stable or improving  Description  Interventions:  - Monitor and assess patient's level of consciousness, motor function, sensory function, and level of assistance needed for ADLs  - Monitor and report changes from baseline  Collaborate with interdisciplinary team to initiate plan and implement interventions as ordered  - Provide and maintain a safe environment  - Utilize seizure precautions  - Utilize fall precautions  - Utilize aspiration precautions  - Utilize bleeding precautions  Outcome: Progressing     Problem:  Activity Intolerance/Impaired Mobility  Goal: Mobility/activity is maintained at optimum level for patient  Description  Interventions:  - Assess and monitor patient  barriers to mobility and need for assistive/adaptive devices  - Assess patient's emotional response to limitations  - Collaborate with interdisciplinary team and initiate plans and interventions as ordered  - Encourage independent activity per ability   - Maintain proper body alignment  - Perform active/passive rom as tolerated/ordered  - Plan activities to conserve energy   - Turn patient  Outcome: Progressing     Problem: Communication Impairment  Goal: Ability to express needs and understand communication  Description  Assess patient's communication skills and ability to understand information  Patient will demonstrate use of effective communication techniques, alternative methods of communication and understanding even if not able to speak  - Encourage communication and provide alternate methods of communication as needed  - Collaborate with case management/ for discharge needs  - Include patient/family/caregiver in decisions related to communication  Outcome: Progressing     Problem: Potential for Aspiration  Goal: Non-ventilated patient's risk of aspiration is minimized  Description  Assess and monitor vital signs, respiratory status, and labs (WBC)  Monitor for signs of aspiration (tachypnea, cough, rales, wheezing, cyanosis, fever)  - Assess and monitor patient's ability to swallow  - Place patient up in chair to eat if possible  - HOB up at 90 degrees to eat if unable to get patient up into chair   - Supervise patient during oral intake  - Instruct patient to take small bites  - Instruct patient to take small single sips when taking liquids    - Follow patient-specific strategies generated by speech pathologist   Outcome: Progressing     Problem: Nutrition  Goal: Nutrition/Hydration status is improving  Description  Monitor and assess patient's nutrition/hydration status for malnutrition (ex- brittle hair, bruises, dry skin, pale skin and conjunctiva, muscle wasting, smooth red tongue, and disorientation)  Collaborate with interdisciplinary team and initiate plan and interventions as ordered  Monitor patient's weight and dietary intake as ordered or per policy  Utilize nutrition screening tool and intervene per policy  Determine patient's food preferences and provide high-protein, high-caloric foods as appropriate  - Assist patient with eating   - Allow adequate time for meals   - Encourage patient to take dietary supplement as ordered  - Collaborate with clinical nutritionist   - Include patient/family/caregiver in decisions related to nutrition    Outcome: Progressing     Problem: PAIN - ADULT  Goal: Verbalizes/displays adequate comfort level or baseline comfort level  Description  Interventions:  - Encourage patient to monitor pain and request assistance  - Assess pain using appropriate pain scale  - Administer analgesics based on type and severity of pain and evaluate response  - Implement non-pharmacological measures as appropriate and evaluate response  - Consider cultural and social influences on pain and pain management  - Notify physician/advanced practitioner if interventions unsuccessful or patient reports new pain  Outcome: Progressing     Problem: SAFETY ADULT  Goal: Maintain or return to baseline ADL function  Description  INTERVENTIONS:  -  Assess patient's ability to carry out ADLs; assess patient's baseline for ADL function and identify physical deficits which impact ability to perform ADLs (bathing, care of mouth/teeth, toileting, grooming, dressing, etc )  - Assess/evaluate cause of self-care deficits   - Assess range of motion  - Assess patient's mobility; develop plan if impaired  - Assess patient's need for assistive devices and provide as appropriate  - Encourage maximum independence but intervene and supervise when necessary  ¯ Involve family in performance of ADLs  ¯ Assess for home care needs following discharge   ¯ Request OT consult to assist with ADL evaluation and planning for discharge  ¯ Provide patient education as appropriate  Outcome: Progressing  Goal: Maintain or return mobility status to optimal level  Description  INTERVENTIONS:  - Assess patient's baseline mobility status (ambulation, transfers, stairs, etc )    - Identify cognitive and physical deficits and behaviors that affect mobility  - Identify mobility aids required to assist with transfers and/or ambulation (gait belt, sit-to-stand, lift, walker, cane, etc )  - [place] fall precautions as indicated by assessment  - Record patient progress and toleration of activity level on Mobility SBAR; progress patient to next Phase/Stage  - Instruct patient to call for assistance with activity based on assessment  - Request Rehabilitation consult to assist with strengthening/weightbearing, etc   Outcome: Progressing     Problem: Knowledge Deficit  Goal: Patient/family/caregiver demonstrates understanding of disease process, treatment plan, medications, and discharge instructions  Description  Complete learning assessment and assess knowledge base  Interventions:  - Provide teaching at level of understanding  - Provide teaching via preferred learning methods  Outcome: Progressing     Problem: CARDIOVASCULAR - ADULT  Goal: Maintains optimal cardiac output and hemodynamic stability  Description  INTERVENTIONS:  - Monitor I/O, vital signs and rhythm  - Monitor for S/S and trends of decreased cardiac output i e  bleeding, hypotension  - Administer and titrate ordered vasoactive medications to optimize hemodynamic stability  - Assess quality of pulses, skin color and temperature  - Assess for signs of decreased coronary artery perfusion - ex   Angina  - Instruct patient to report change in severity of symptoms  Outcome: Progressing  Goal: Absence of cardiac dysrhythmias or at baseline rhythm  Description  INTERVENTIONS:  - Continuous cardiac monitoring, monitor vital signs, obtain 12 lead EKG if indicated  - Administer antiarrhythmic and heart rate control medications as ordered  - Monitor electrolytes and administer replacement therapy as ordered  Outcome: Progressing     Problem: METABOLIC, FLUID AND ELECTROLYTES - ADULT  Goal: Electrolytes maintained within normal limits  Description  INTERVENTIONS:  - Monitor labs and assess patient for signs and symptoms of electrolyte imbalances  - Administer electrolyte replacement as ordered  - Monitor response to electrolyte replacements, including repeat lab results as appropriate  - Instruct patient on fluid and nutrition as appropriate  Outcome: Progressing  Goal: Fluid balance maintained  Description  INTERVENTIONS:  - Monitor labs and assess for signs and symptoms of volume excess or deficit  - Monitor I/O and WT  - Instruct patient on fluid and nutrition as appropriate  Outcome: Progressing     Problem: SKIN/TISSUE INTEGRITY - ADULT  Goal: Skin integrity remains intact  Description  INTERVENTIONS  - Identify patients at risk for skin breakdown  - Assess and monitor skin integrity  - Assess and monitor nutrition and hydration status  - Monitor labs (i e  albumin)  - Assess for incontinence   - Turn and reposition patient  - Assist with mobility/ambulation  - Relieve pressure over bony prominences  - Avoid friction and shearing  - Provide appropriate hygiene as needed including keeping skin clean and dry  - Evaluate need for skin moisturizer/barrier cream  - Collaborate with interdisciplinary team (i e  Nutrition, Rehabilitation, etc )   - Patient/family teaching  Outcome: Progressing  Goal: Oral mucous membranes remain intact  Description  INTERVENTIONS  - Assess oral mucosa and hygiene practices  - Implement preventative oral hygiene regimen  - Implement oral medicated treatments as ordered  - Initiate Nutrition services referral as needed  Outcome: Progressing     Problem: MUSCULOSKELETAL - ADULT  Goal: Maintain or return mobility to safest level of function  Description  INTERVENTIONS:  - Assess patient's ability to carry out ADLs; assess patient's baseline for ADL function and identify physical deficits which impact ability to perform ADLs (bathing, care of mouth/teeth, toileting, grooming, dressing, etc )  - Assess/evaluate cause of self-care deficits   - Assess range of motion  - Assess patient's mobility; develop plan if impaired  - Assess patient's need for assistive devices and provide as appropriate  - Encourage maximum independence but intervene and supervise when necessary  - Involve family in performance of ADLs  - Assess for home care needs following discharge   - Request OT consult to assist with ADL evaluation and planning for discharge  - Provide patient education as appropriate  Outcome: Progressing  Goal: Maintain proper alignment of affected body part  Description  INTERVENTIONS:  - Support, maintain and protect limb and body alignment  - Provide pt/fam with appropriate education  Outcome: Progressing

## 2019-07-19 NOTE — PROGRESS NOTES
Progress Note - Vascular Surgery   Sharon King 81 y o  male MRN: [id number]  Unit/Bed#: ICU 15 Encounter: [id number]        81year-old male history of colon cancer with a 60 pack-year history of smoking quitting 2 years ago, who presented to the ED with his daughter after being found down on bathroom floor for an unknown amount of time, possibly over 24 hours  Patient's symptoms at arrival included left-sided weakness and altered mental status  [facility] alert was called and CT head neck revealed occlusion of right MCA with minimal opacification of distal M3 branches and severe right carotid stenosis  Vitals:  BP 90/50   Pulse (!) 129   Temp 97 5 °F (36 4 °C) (Temporal)   Resp (!) 37   Ht 5' 6" (1 676 m)   Wt 81 kg (178 lb 9 2 oz)   SpO2 97%   BMI 28 82 kg/m²     I/Os:  I/O last 3 completed shifts:   In: 5287 5 [I V :3787 5; IV Piggyback:1500]  Out: 2250 [Urine:2250]  I/O this shift:  In: 358 3 [I V :358 3]  Out: 1410 [Urine:1410]    Lab Results and Cultures:   CBC with diff:   Lab Results   Component Value Date    WBC 10 38 (H) 07/19/2019    HGB 10 2 (L) 07/19/2019    HCT 31 6 (L) 07/19/2019    MCV 95 07/19/2019     07/19/2019    MCH 30 7 07/19/2019    MCHC 32 3 07/19/2019    RDW 16 7 (H) 07/19/2019    MPV 10 1 07/19/2019    NRBC 0 07/19/2019   ,   BMP/CMP:  Lab Results   Component Value Date    SODIUM 144 07/19/2019    K 3 7 07/19/2019     (H) 07/19/2019    CO2 18 (L) 07/19/2019    BUN 19 07/19/2019    CREATININE 1 21 07/19/2019    CALCIUM 7 7 (L) 07/19/2019    AST 72 (H) 07/19/2019    ALT 24 07/19/2019    ALKPHOS 68 07/19/2019    EGFR 55 07/19/2019   ,   Lipid Panel: No results found for: CHOL,   Coags:   Lab Results   Component Value Date    PTT 25 07/17/2019    INR 1 05 07/17/2019   ,     Blood Culture:   Lab Results   Component Value Date    BLOODCX No Growth at 24 hrs  07/17/2019    BLOODCX No Growth at 24 hrs  07/17/2019   ,   Urinalysis:   Lab Results   Component Value Date    COLORU Yellow 07/17/2019    CLARITYU Slightly Cloudy 07/17/2019    SPECGRAV 1 020 07/17/2019    PHUR 5 5 07/17/2019    LEUKOCYTESUR Negative 07/17/2019    NITRITE Negative 07/17/2019    GLUCOSEU Negative 07/17/2019    KETONESU Negative 07/17/2019    BILIRUBINUR Negative 07/17/2019    BLOODU Large (A) 07/17/2019   ,   Urine Culture: No results found for: URINECX,   Wound Culure: No results found for: WOUNDCULT    Medications:  Current Facility-Administered Medications   Medication Dose Route Frequency    acetaminophen (TYLENOL) rectal suppository 325 mg  325 mg Rectal Q4H PRN    aspirin rectal suppository 300 mg  300 mg Rectal Daily    atorvastatin (LIPITOR) tablet 40 mg  40 mg Oral QPM    clopidogrel (PLAVIX) tablet 300 mg  300 mg Oral Once    digoxin (LANOXIN) injection 62 5 mcg  62 5 mcg Intravenous Once    diltiazem (CARDIZEM) 125 mg in sodium chloride 0 9 % 125 mL infusion  1-15 mg/hr Intravenous Titrated    heparin (porcine) subcutaneous injection 5,000 Units  5,000 Units Subcutaneous Q8H [facility]    HYDROmorphone (DILAUDID) injection 0 2 mg  0 2 mg Intravenous Q4H PRN    ipratropium (ATROVENT) 0 02 % inhalation solution 0 5 mg  0 5 mg Nebulization Q6H    Labetalol HCl (NORMODYNE) injection 10 mg  10 mg Intravenous Q6H PRN    levalbuterol (XOPENEX) inhalation solution 1 25 mg  1 25 mg Nebulization Q6H    And    sodium chloride 0 9 % inhalation solution 3 mL  3 mL Nebulization Q6H    pneumococcal 13-valent conjugate vaccine (PREVNAR-13) IM injection 0 5 mL  0 5 mL Intramuscular Prior to discharge    sodium chloride 0 9 % infusion  125 mL/hr Intravenous Continuous       Imaging:  MRI BRAIN WITHOUT CONTRAST     INDICATION: right MCA infarct      COMPARISON:   7/17/2019 CT/CTA     TECHNIQUE:  Sagittal T1, axial T2, axial FLAIR, axial T1, axial [place] and axial diffusion imaging      IMAGE QUALITY:  Diagnostic      FINDINGS:     BRAIN PARENCHYMA:   A large region of acute to subacute infarction involves the principally the perisylvian portion of the right temporal lobe with less pronounced involvement of the adjacent inferior right frontal and anterior right parietal lobes  There is contiguous   acute involvement of the head of the right caudate nucleus and corona radiata  Mild edema and mass effect with mild right lateral ventricular compression  Findings are consistent with CT  No additional acute ischemia identified  Mild microangiopathic   ischemic changes involve deep white matter elsewhere  Development of region of acute hemorrhage measuring approximately 1 cm x 1 5 cm acute microhemorrhage within the right lenticular nucleus and to lesser degree adjacent temporal lobe       There is no discrete mass, or midline shift       VENTRICLES:  The ventricles are normal in size and contour      SELLA AND PITUITARY GLAND:  Normal      ORBITS:  Normal      PARANASAL SINUSES:  Normal      VASCULATURE: Poorly visualized right middle cerebral artery     CALVARIUM AND SKULL BASE:  Normal      EXTRACRANIAL SOFT TISSUES:  Normal      IMPRESSION:  Large acute right MCA territory infarction primarily involving the temporal lobe with lesser degree of involvement of the inferolateral frontal lobe and anterior parietal lobe  Consistent with CT/CTA     Development of acute microhemorrhage within the right lenticular nucleus and adjacent temporal lobe      CTA NECK AND BRAIN WITH AND WITHOUT CONTRAST     INDICATION: Acute stroke      COMPARISON:   7/17/2019      TECHNIQUE:  Post contrast imaging was performed after administration of iodinated contrast through the neck and brain  Post contrast axial 0 625 mm images timed to opacify the arterial system        3D rendering was performed on an independent workstation  MIP reconstructions performed  Coronal reconstructions were performed of the noncontrast portion of the brain        Radiation dose length product (DLP) for this visit:  353 18 mGy-cm     This examination, like all CT scans performed in the [facility], was performed utilizing techniques to minimize radiation dose exposure, including the use of iterative   reconstruction and automated exposure control         IV Contrast:  85 mL of iohexol (OMNIPAQUE)     IMAGE QUALITY:   Motion artifact results in suboptimal evaluation     FINDINGS:     CERVICAL VASCULATURE  AORTIC ARCH AND GREAT VESSELS:  Three-vessel configuration aortic arch  Great vessel origins not well evaluated due to motion artifact  No significant stenosis in the subclavian arteries      RIGHT VERTEBRAL ARTERY CERVICAL SEGMENT:  Normal origin  The vessel is normal in caliber throughout the neck      LEFT VERTEBRAL ARTERY CERVICAL SEGMENT:  Normal origin  The vessel is normal in caliber throughout the neck      RIGHT EXTRACRANIAL CAROTID SEGMENT:  Normal caliber common carotid artery  Calcified plaque at the bifurcation and ICA origin  Severe (70-90%) stenosis at the ICA origin  Decreased caliber of the vessel throughout the cervical segment with intimal   thickening      LEFT EXTRACRANIAL CAROTID SEGMENT:  Normal caliber common carotid artery  Calcified plaque the bifurcation and ICA origin resulting in mild to moderate (50-60%) stenosis      NASCET criteria was used to determine the degree of internal carotid artery diameter stenosis        INTRACRANIAL VASCULATURE   INTERNAL CAROTID ARTERIES:  Diminutive right carotid siphon likely secondary to severe proximal stenosis  Calcified plaque throughout the left carotid siphon without significant stenosis  Patent ophthalmic arteries      ANTERIOR CIRCULATION:  Absent or severely hypoplastic right A1 segment  Anterior to indicating artery visualized  No stenosis in the anterior cerebral arteries      MIDDLE CEREBRAL ARTERY CIRCULATION: Absence of opacification of the proximal right middle cerebral artery  Minimal enhancement of distal M3 branches suggesting poor collateral flow    No stenosis in the proximal right middle cerebral artery      DISTAL VERTEBRAL ARTERIES:  Normal distal vertebral arteries  Posterior inferior cerebellar artery origins are normal  Normal vertebral basilar junction      BASILAR ARTERY:  Basilar artery is normal in caliber  Normal superior cerebellar arteries      POSTERIOR CEREBRAL ARTERIES: Both posterior cerebral arteries arises from the basilar tip  Both arteries demonstrate normal enhancement  Tiny bilateral posterior communicating arteries      DURAL VENOUS SINUSES:  Limited evaluation without evidence of or thrombosis        NON VASCULAR ANATOMY  BONY STRUCTURES:  No acute osseous abnormality      SOFT TISSUES OF THE NECK:  Normal      THORACIC INLET:  Unremarkable         IMPRESSION:        1  Severe (70-90%) stenosis at the origin of the right internal carotid artery  Remainder of the vessel is diminutive likely secondary to proximal stenosis  2   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  Very minimal opacification of distal M3 branches suggesting poor collateral flow  Physical Exam:    General:  Lying in bed on nasal cannula oxygen, with head postured turning right  CV: irregularly irregular rhythm, difficult to auscultate over patient's tachypnea and rales  Respiratory:  Tachypneic, rales on expiration  Abdominal:  Ventral hernia present, soft, nttp  Neurologic:  Patient alert and oriented x3  During my exam he is not having difficulty finding words but is difficult to understand at times  Left facial droop present  Still able to lift eyebrows symmetrically bilateral   Right-sided gaze  Able to stick tongue out straight  Upper extremity strength: Left: 0 right: 5  Left upper extremity sensation to crude touch: 0  He can only shrug right shoulder and struggles to look left  Patient unwilling to move around due to hip pain         Wound/Incision:  Pressure injuries bilateral feet        Assessment:  · right MCA CVA    · Severe stenosis of right ICA (70-90%)       Plan:  · Continue to monitor neurologic exam  · Will need to discuss with patient and his family need for possible carotid stenting vs endarterectomy  · Continue sq heparin q8 and aspirin

## 2019-07-19 NOTE — OCCUPATIONAL THERAPY NOTE
OT TREATMENT       07/19/19 1515   Restrictions/Precautions   RLE Weight Bearing Per Order WBAT  (R pubic ramus fracture nondisplaced)   Other Precautions Chair Alarm; Bed Alarm; Fall Risk;O2;Multiple lines  (in ICU, left neglect and hemiparesis)   Pain Assessment   Pain Assessment No/denies pain   ADL   Grooming Assistance 4  Minimal Assistance   Grooming Deficit Setup; Wash/dry hands   Grooming Comments using RUE   Transfers   Sit to Stand Unable to assess   ROM- Right Upper Extremities   R Shoulder AROM; Flexion; Horizontal ABduction   R Elbow AROM;Elbow flexion;Elbow extension   R Hand AROM; Thumb; Index finger; Long finger;Ring finger;Little finger   R Weight/Reps/Sets 10 times each, verbal cues to attend to task   ROM - Left Upper Extremities    L Shoulder PROM; Flexion;ABduction;Horizontal ABduction   L Elbow PROM;Elbow flexion;Elbow extension   L Wrist PROM; Wrist flexion;Wrist extension   L Hand PROM; Thumb; Index finger; Long finger;Ring finger;Little finger   L Weight/Reps/Sets 10 times each    Assessment   Assessment Patient is cooperative and alert  Patient able to follow 1 step commands with increased time/repitition today  No active movement or muscle contration noted in LUE  Patient is more alert and able to answer questions today  Patient now with R pubic ramus fracture  Patient with left neglect, encouraged to turn head and look to left  Plan   Treatment Interventions ADL retraining;Functional transfer training;UE strengthening/ROM; Endurance training; Activityengagement; Compensatory technique education;Patient/family training   Recommendation   OT Discharge Recommendation [id number] License Number  [place] Miss Joyce Magnus 88 OTR/L [id number]

## 2019-07-19 NOTE — PHYSICAL THERAPY NOTE
PT TREATMENT     07/19/19 4615   Pain Assessment   Pain Assessment Reddy-Baker FACES   Reddy-Baker FACES Pain Rating 4  (R hip area and L knee)   Restrictions/Precautions   RLE Weight Bearing Per Order WBAT  (R pubic ramus fracture nondisplaced)   Other Precautions Chair Alarm; Bed Alarm; Fall Risk;Pain;O2;Multiple lines  (in ICU with L hemiparesis, R pubic ramus nondis fx)   General   Chart Reviewed Yes   Family/Caregiver Present No   Cognition   Arousal/Participation Cooperative   Subjective   Subjective patient more verbal and answering questions, speech garbled at times   Bed Mobility   Rolling R 2  Maximal assistance   Additional items Assist x 1;Verbal cues;LE management   Rolling L 2  Maximal assistance   Additional items Assist x 1;Verbal cues;LE management   Supine to Sit Unable to assess  (L UE/LE flaccid with L neglect, poor trunk control )   Exercises   Quad Sets Supine;5 reps;AROM; Right   Heelslides Supine;10 reps;AAROM; Right  (PROM LLE)   Hip Abduction Supine;5 reps;AAROM; Right  (PROM LLE)   Knee AROM Short Arc Quad Supine;10 reps;AROM; Right   Ankle Pumps Supine;10 reps;AROM; Right   Heel Cord Stretch Supine;5 reps;PROM; Left   Assessment   Assessment patient cooperative and following commands, one step with increased time and repetition and will benefit from continued PT with progression as tolerated   Plan   Treatment/Interventions ADL retraining;Functional transfer training;LE strengthening/ROM; Therapeutic exercise; Endurance training;Patient/family training;Equipment eval/education; Bed mobility;Gait training; Compensatory technique education   PT Frequency Once a day   Recommendation   Recommendation Short-term skilled PT   Licensure   [place] License Number  Lizet Agnes JIANG [id number]

## 2019-07-20 PROBLEM — I65.21 SYMPTOMATIC STENOSIS OF RIGHT CAROTID ARTERY: Status: ACTIVE | Noted: 2019-01-01

## 2019-07-20 PROBLEM — I48.91 ATRIAL FIBRILLATION WITH RVR (HCC): Status: ACTIVE | Noted: 2019-01-01

## 2019-07-20 PROBLEM — D72.829 LEUKOCYTOSIS: Status: RESOLVED | Noted: 2019-01-01 | Resolved: 2019-01-01

## 2019-07-20 PROBLEM — N17.9 ACUTE KIDNEY INJURY (HCC): Status: RESOLVED | Noted: 2019-01-01 | Resolved: 2019-01-01

## 2019-07-20 PROBLEM — M25.551 RIGHT HIP PAIN: Status: ACTIVE | Noted: 2019-01-01

## 2019-07-20 PROBLEM — R79.89 ELEVATED LACTIC ACID LEVEL: Status: RESOLVED | Noted: 2019-01-01 | Resolved: 2019-01-01

## 2019-07-20 PROBLEM — N17.9 ACUTE KIDNEY INJURY (HCC): Status: ACTIVE | Noted: 2019-01-01

## 2019-07-20 PROBLEM — T79.6XXA TRAUMATIC RHABDOMYOLYSIS (HCC): Status: ACTIVE | Noted: 2019-01-01

## 2019-07-20 NOTE — ASSESSMENT & PLAN NOTE
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"  Endovascular Neurology was contacted by ED, who declined intervention given age and unknown onset   Dr Imelda Stallworth with Neurology was contacted, patient not a tPA candidate  Recommended 300mg rectal ASA and stroke pathway with close neurologic monitoring overnight given large infarct with possibility of swelling as timing of infarct is unknown  MRI of the brain - Large acute right MCA territory infarction primarily involving the temporal lobe with lesser degree of involvement of the inferolateral frontal lobe and anterior parietal lobe   Development of acute microhemorrhage within the right lenticular nucleus and adjacent temporal lobe  2D echo-EF 06%, grade 1 diastolic dysfunction  No shunt noted  CVA most likely secondary to symptomatic high-grade right carotid stenosis, but cannot rule out contribution from AFib  Neuro remained stable, hold sedating medications  , hemoglobin A1c 6  Allow permissive hypertension with SBP goal 130-140  Repeat CT head - Evolving right MCA territory infarction with petechial hemorrhage in the right basal ganglia, stable  · Continue full dose aspirin  · Lipitor 40 mg daily for now but increase to 80 mg once rhabdomyolysis is improved, follow-up CPK  · Discussed with Cardiology and Neurology, patient was started on IV heparin  · PT/OT/ST  · Vascular surgery consultation noted, recommended follow-up as outpatient for carotid endarterectomy    Follow-up baseline carotid ultrasound  · Patient will need anticoagulation in light of AFib based on CT scan findings  · VBSS done today with aspiration- ok to start puree with honey thick liquids  · Clinically patient very high risk of aspiration, and cannot meet caloric intake needs on current diet     · GI consulted for PEG tube, patient agreeable to it for short term, does not want it lifelong

## 2019-07-20 NOTE — PROGRESS NOTES
Progress Note - Cardiology   [facility]     Jeanine Mike Sr 81 y o  male MRN: [id number]  : 1935  Unit/Bed#: ICU 12 Encounter: [id number]    Assessment and Plan:     1  Atrial fibrillation with rapid ventricular rate  Noted here 1st time  May be has history of paroxysmal atrial fibrillation  Patient has limited medical follow-up in the past   He was started on digoxin and on Cardizem drip  Heart rate still not acceptable  Permissive hypertension is also desired  Will give additional dose of digoxin  Will consider antithrombotic therapy with IV heparin without bolus if okay with Neurology and CT scan shows no significant bleeding problems  2  CVA  Most likely due to thrombosis of right middle cerebral artery  Follow-up by Neurology    3  Dyslipidemia  Continue statin  4  Right hip pain  Patient is still reporting some pain  Orthopedics note noted  No obvious pelvis fracture  5  Traumatic rhabdomyolysis  Patient was lying on the floor for prolonged period of time  CPK was elevated  Monitor electrolytes  Continue gentle hydration EF around 50%  6  History of acute kidney injury  Serum creatinine now improving  Discussed with medical team     Subjective / Objective:   Patient seen and evaluated  Feels fatigue and tired BUN was evaluated in ICU when came back from CT scan  Heart rate still around  beats per minute  He has been on Cardizem drip and digoxin  Still not clear to take orally  He still complaining about pelvic area pain  Orthopedics note noted  CT scan shows some petechial hemorrhage no nathalia bleeding  No other significant issues from cardiac point of view  Vitals: Blood pressure 132/76, pulse (!) 119, temperature 97 6 °F (36 4 °C), temperature source Temporal, resp  rate (!) 38, height 5' 6" (1 676 m), weight 71 8 kg (158 lb 4 6 oz), SpO2 97 %    Vitals:    19 0600 19 0200   Weight: 81 kg (178 lb 9 2 oz) 71 8 kg (158 lb 4 6 oz)     Body mass index is 22 71 kg/m²  BP Readings from Last 3 Encounters:   07/20/19 132/76   12/18/18 120/70   02/13/18 130/80     Orthostatic Blood Pressures      Most Recent Value   Blood Pressure  132/76 filed at 07/20/2019 1500   Patient Position - Orthostatic VS  Lying filed at 07/19/2019 2000        I/O       07/18 0701 - 07/19 0700 07/19 0701 - 07/20 0700 07/20 0701 - 07/21 0700    P  O    0    I V  (mL/kg) 2759 2 (34 1) 1768 3 (24 6)     IV Piggyback 500      Total Intake(mL/kg) 3259 2 (40 2) 1768 3 (24 6) 0 (0)    Urine (mL/kg/hr) 2050 (1 1) 2285 (1 3) 1925 (3 1)    Total Output 2050 2285 1925    Net +1209 2 -516 7 -1925               Invasive Devices     Peripheral Intravenous Line            Peripheral IV 07/17/19 Left Hand 2 days          Drain            Urethral Catheter Non-latex 16 Fr  2 days                  Intake/Output Summary (Last 24 hours) at 7/20/2019 1545  Last data filed at 7/20/2019 1404  Gross per 24 hour   Intake 1410 ml   Output 2525 ml   Net -1115 ml         Physical Exam:   Physical Exam    Neurologic:  Alert & oriented x 3, no new weakness of left upper extremity and lower like the right side noted  Constitutional:  Well developed, well nourished,  With no acute distress  Eyes:  PERRL, conjunctiva normal   HENT:  Atraumatic, external ears normal, nose normal,   NECK: Normal range of motion, no tenderness, neck is supple , No JVP  Respiratory:  Bilateral air entry mostly clear to auscultation  Cardiovascular:  S1-S2 irregular with 2/6 ejection systolic murmur  GI:  Soft, nondistended, normal bowel sounds, nontender, no hepatosplenomegaly appreciated  Musculoskeletal:  No tenderness, no deformities      Extremities:  Mild edema and distal pulses are present  Psychiatric:  Speech and behavior appropriate             Medications/ Allergies:     Current Facility-Administered Medications:  acetaminophen 325 mg Rectal Q4H PRN William Rico MD    aspirin 300 mg Rectal Daily USMAN Ramírez    atorvastatin 40 mg Oral QPM WYATT Arellano    digoxin 62 5 mcg Intravenous Daily Delia Perkins DO    diltiazem 1-15 mg/hr Intravenous Titrated Tc Bautista MD Last Rate: 5 mg/hr (07/19/19 2134)   heparin (porcine) 300-2,000 Units/hr Intravenous Titrated Tc Bautista MD    HYDROmorphone 0 2 mg Intravenous Q4H PRN Tc Bautista MD    ipratropium 0 5 mg Nebulization Q6H Tc Bautista MD    Labetalol HCl 10 mg Intravenous Q6H PRN WYATT Hunter    levalbuterol 1 25 mg Nebulization Q6H Tc Bautista MD    And        sodium chloride 3 mL Nebulization Q6H Tc Bautista MD    pneumococcal 13-valent conjugate vaccine 0 5 mL Intramuscular Prior to discharge WYATT Hunter    sodium chloride 75 mL/hr Intravenous Continuous Tc Bautista MD Last Rate: 75 mL/hr (07/20/19 5457)       acetaminophen 325 mg Q4H PRN   HYDROmorphone 0 2 mg Q4H PRN   Labetalol HCl 10 mg Q6H PRN   pneumococcal 13-valent conjugate vaccine 0 5 mL Prior to discharge     Allergies   Allergen Reactions    Hydrocodone          Labs:   Troponins:  Results from last 7 days   Lab Units 07/20/19  0430 07/19/19  0518 07/18/19  0506   CK TOTAL U/L 2,506* 3,324* 6,896*   CK MB INDEX % <1 0 <1 0 <1 0       CBC with diff:  Results from last 7 days   Lab Units 07/20/19  0430 07/19/19  0518 07/18/19  0506 07/17/19  1905   WBC Thousand/uL 8 20 10 38* 14 71* 16 18*   HEMOGLOBIN g/dL 10 3* 10 2* 11 3* 12 3   HEMATOCRIT % 31 8* 31 6* 35 3* 38 3   MCV fL 95 95 95 95   PLATELETS Thousands/uL 172 164 196 213   MCH pg 30 7 30 7 30 4 30 6   MCHC g/dL 32 4 32 3 32 0 32 1   RDW % 16 2* 16 7* 16 6* 16 1*   MPV fL 9 6 10 1 9 3 9 5   NRBC AUTO /100 WBCs 0 0 0 0       CMP:  Results from last 7 days   Lab Units 07/20/19  0430 07/19/19  0518 07/18/19  0506 07/17/19  1905   SODIUM mmol/L 144 144 143 138   POTASSIUM mmol/L 3 4* 3 7 3 8 4 7   CHLORIDE mmol/L 110* 111* 108 104   CO2 mmol/L 23 18* 25 24 ANION GAP mmol/L 11 15* 10 10   BUN mg/dL 17 19 19 17   CREATININE mg/dL 1 07 1 21 1 42* 1 55*   CALCIUM mg/dL 7 9* 7 7* 7 9* 8 0*   AST U/L 74* 72*  --  78*   ALT U/L 35 24  --  20   ALK PHOS U/L 76 68  --  92   TOTAL PROTEIN g/dL 6 1* 6 4  --  6 7   ALBUMIN g/dL 2 7* 3 2*  --  3 0*   TOTAL BILIRUBIN mg/dL 0 90 0 80  --  0 40   EGFR ml/min/1 73sq m 64 55 46 41       Magnesium:  Results from last 7 days   Lab Units 07/20/19  0430 07/19/19  0518 07/18/19  0506 07/17/19  1905   MAGNESIUM mg/dL 2 0 2 0 2 0 2 0     Coags:  Results from last 7 days   Lab Units 07/17/19  1905   PTT seconds 25   INR  1 05     TSH:  Results from last 7 days   Lab Units 07/17/19  1905   TSH 3RD GENERATON uIU/mL 2 007     Lipid Profile:  Results from last 7 days   Lab Units 07/18/19  0506   CHOLESTEROL mg/dL 204*   TRIGLYCERIDES mg/dL 83   HDL mg/dL 47   LDL CALC mg/dL 140*     Hgb A1c:  Results from last 7 days   Lab Units 07/18/19  0506   HEMOGLOBIN A1C % 6 0     NT-proBNP:   Recent Labs     07/17/19  1905   NTBNP 644*        Imaging & Testing   I have personally reviewed pertinent reports  Xr Chest Portable    Result Date: 7/19/2019  Narrative: CHEST INDICATION:   Shortness of breath  COMPARISON:  7/17/2019 EXAM PERFORMED/VIEWS:  XR CHEST PORTABLE Images: 2 (duplicate image re-windowed with line enhancement technique) FINDINGS: Cardiomediastinal silhouette appears stable  Calcified uncoiled thoracic aorta  Mild prominence of the ascending aortic contour which could represent tortuosity  CT imaging would be required to evaluate aortic size if clinically relevant  The lungs are grossly clear  Slight elevation of the right diaphragm  No pneumothorax or pleural effusion  Osseous structures appear within normal limits for patient age  Surgical clips right upper quadrant  Impression: No acute cardiopulmonary disease  See comments   Workstation performed: [id number]     Xr Chest 1 View Portable    Result Date: 7/18/2019  Narrative: CHEST INDICATION:   htn  Stroke COMPARISON:  None EXAM PERFORMED/VIEWS:  XR CHEST PORTABLE 1 image FINDINGS: Cardiomediastinal silhouette appears unremarkable  The lungs are clear  No pneumothorax or pleural effusion  Osseous structures appear within normal limits for patient age  Impression: No acute cardiopulmonary disease  Workstation performed: [id number]     Ct Head Wo Contrast    Result Date: 7/20/2019  Narrative: CT BRAIN - WITHOUT CONTRAST INDICATION:   Intracranial hemorrhage; Stroke; Follow-up prior to starting anticoagulation  COMPARISON:  MR brain July 18, 2019, CTA stroke alert July 17, 2019 and CT brain July 17, 2019  TECHNIQUE:  CT examination of the brain was performed  In addition to axial images, coronal 2D reformatted images were created and submitted for interpretation  Radiation dose length product (DLP) for this visit:  997 84 mGy-cm   This examination, like all CT scans performed in the [facility], was performed utilizing techniques to minimize radiation dose exposure, including the use of iterative  reconstruction and automated exposure control  IMAGE QUALITY:  Diagnostic  FINDINGS: PARENCHYMA:  Again identified is a hyperdense right middle cerebral artery  There is an associated right MCA territory infarction  Increased density in the right basal ganglia, most compatible with petechial hemorrhage  There is mild mass effect with sulcal effacement  Midline shift of approximately 3 mm to the left  This has not significantly changed  Compression of the right lateral ventricle  Decreased attenuation in the periventricular regions and centrum semiovale bilaterally, consistent with chronic small vessel ischemic change    Prominence of the extra-axial space adjacent to the anterior left temporal lobe, most compatible with arachnoid cyst  VENTRICLES AND EXTRA-AXIAL SPACES:  Stable mild mass effect from right MCA territory infarction with sulcal effacement and compression of the right lateral ventricle  Midline shift of approximately 3 mm to the left, stable  Otherwise, the ventricular system is moderately dilated  VISUALIZED ORBITS AND PARANASAL SINUSES:  No acute abnormality involving the orbits  Mild scattered sinus mucosal thickening is noted  No fluid levels are seen  CALVARIUM AND EXTRACRANIAL SOFT TISSUES:  Calvarium intact  Incidental note is made of ununited posterior arch of C1  Impression: 1  Evolving right MCA territory infarction with petechial hemorrhage in the right basal ganglia, stable  2   Cerebral atrophy with chronic small vessel ischemic change  Workstation performed: [id number]     Mri Brain Wo Contrast    Result Date: 7/18/2019  Narrative: MRI BRAIN WITHOUT CONTRAST INDICATION: right MCA infarct  COMPARISON:   7/17/2019 CT/CTA TECHNIQUE:  Sagittal T1, axial T2, axial FLAIR, axial T1, axial [place] and axial diffusion imaging  IMAGE QUALITY:  Diagnostic  FINDINGS: BRAIN PARENCHYMA: A large region of acute to subacute infarction involves the principally the perisylvian portion of the right temporal lobe with less pronounced involvement of the adjacent inferior right frontal and anterior right parietal lobes  There is contiguous acute involvement of the head of the right caudate nucleus and corona radiata  Mild edema and mass effect with mild right lateral ventricular compression  Findings are consistent with CT  No additional acute ischemia identified  Mild microangiopathic  ischemic changes involve deep white matter elsewhere  Development of region of acute hemorrhage measuring approximately 1 cm x 1 5 cm acute microhemorrhage within the right lenticular nucleus and to lesser degree adjacent temporal lobe  There is no discrete mass, or midline shift  VENTRICLES:  The ventricles are normal in size and contour   SELLA AND PITUITARY GLAND:  Normal  ORBITS:  Normal  PARANASAL SINUSES:  Normal  VASCULATURE: Poorly visualized right middle cerebral artery CALVARIUM AND SKULL BASE:  Normal  EXTRACRANIAL SOFT TISSUES:  Normal      Impression: Large acute right MCA territory infarction primarily involving the temporal lobe with lesser degree of involvement of the inferolateral frontal lobe and anterior parietal lobe  Consistent with CT/CTA Development of acute microhemorrhage within the right lenticular nucleus and adjacent temporal lobe Findings personally discussed by phone with Dr Carolann Berg at 3:10 PM, 7/18/2019 Workstation performed: [id number]     Ct Stroke Alert Brain    Result Date: 7/17/2019  Narrative: CT BRAIN - STROKE ALERT PROTOCOL INDICATION:   Acute stroke  COMPARISON:  None  TECHNIQUE:  CT examination of the brain was performed  In addition to axial images, coronal reformatted images were created and submitted for interpretation  Radiation dose length product (DLP) for this visit:  1017 2 mGy-cm   This examination, like all CT scans performed in the [facility], was performed utilizing techniques to minimize radiation dose exposure, including the use of iterative  reconstruction and automated exposure control  IMAGE QUALITY:  Diagnostic  FINDINGS:  PARENCHYMA:  Cortical and subcortical hypoattenuation with sulcal effacement in the right frontoparietal temporal region, also involving the posterior insula, right basal ganglia and external capsule  No evidence of intracranial hemorrhage  No midline shift  Periventricular and subcortical hypoattenuating foci consistent with microangiopathic disease  Increased density in right M1 and proximal M2 branches likely represent thrombus  VENTRICLES AND EXTRA-AXIAL SPACES:  No hydrocephalus or extra-axial collection  VISUALIZED ORBITS AND PARANASAL SINUSES:  Intact globes and orbits  Chronic paranasal sinus disease  CALVARIUM AND EXTRACRANIAL SOFT TISSUES:  No lytic or blastic lesion or soft tissue mass  Impression: 1    Acute to early subacute right MCA territory infarct involving greater than one third of the vascular territory  No evidence of hemorrhagic conversion or significant mass effect  2   Hyperdense right M1 and M2 branches suggesting acute thrombus  Findings were directly discussed with Marielos Boyel on 7/17/2019 7:16 PM  Workstation performed: [id number]     Mri Pelvis Bony Wo And W Contrast    Result Date: 7/19/2019  Narrative: MRI BONY PELVIS WITH AND WITHOUT CONTRAST INDICATION:   Rigth hip pain, r/o non discplaced fracture  Stroke COMPARISON: X-ray from prior day TECHNIQUE:  MR sequences were obtained of the pelvis including: Precontrast: Localizer, Coronal STIR or coronal T2 fat sat, coronal T1, axial T1, axial T2 fat sat, sagittal T2 fat sat, Sagittal T1, axial LAVA T1 fat sat  Post contrast: axial LAVA T1 fat sat, coronal LAVA T1 fat sat  IV Contrast:  8 mL of Gadobutrol injection (SINGLE-DOSE) FINDINGS: RIGHT HIP: -JOINT EFFUSION: None  -BONE MARROW SIGNAL AND ALIGNMENT: Normal marrow signal demonstrated without hip fracture or AVN  -TROCHANTERIC BURSA: Contains fluid -MUSCLES AND TENDONS:  There is diffuse asymmetric hypertrophy of the right gluteus jarrod, with T2 bright signal   Relative hypoperfusion is seen near the origin  No enhancing mass or collection LEFT HIP: -JOINT EFFUSION: None  -BONE MARROW SIGNAL AND ALIGNMENT: Normal marrow signal demonstrated without hip fracture or AVN  -TROCHANTERIC BURSA: Normal  -MUSCLES AND TENDONS:  Intramuscular edema is seen within the proximal aspect of the vastus musculature proximally, and within the gluteus minimus BONY PELVIS: -SI JOINTS AND SYMPHYSIS PUBIS:   Intact  -VISUALIZED LUMBAR SPINE:  Unremarkable  -OVERALL MARROW SIGNAL:  Normal, without suspicious focal lesions  -MUSCULATURE:  As above -PELVIC SOFT TISSUES:   Additionally there is subcutaneous edema within the soft tissues surrounding the right hip SUBCUTANEOUS TISSUES: Normal     Impression: 1    Asymmetric hypertrophy and edema within the right gluteus jarrod, consider rhabdomyolysis 2  No fracture Workstation performed: [id number]     Xr Hips Bilateral 3-4 Vw W Pelvis If Performed    Result Date: 7/18/2019  Narrative: BILATERAL HIPS AND PELVIS INDICATION:   fall  COMPARISON:  None VIEWS:  XR HIPS BILATERAL 3-4 VW W PELVIS IF PERFORMED  FINDINGS: Possible nondisplaced right superior pubic ramus fracture  Correlate for point tenderness  Visualized lower lumbar spine is unremarkable  LEFT HIP: No significant hip degenerative changes  Joint space alignment is maintained  Soft tissues are unremarkable  RIGHT HIP: No significant hip degenerative changes  Joint space alignment is maintained  Soft tissues are unremarkable  Impression: Possible nondisplaced right superior pubic ramus fracture  Correlate for point tenderness  Workstation performed: [id number]     Cta Stroke Alert (head/neck)    Result Date: 7/17/2019  Narrative: CTA NECK AND BRAIN WITH AND WITHOUT CONTRAST INDICATION: Acute stroke  COMPARISON:   7/17/2019  TECHNIQUE:  Post contrast imaging was performed after administration of iodinated contrast through the neck and brain  Post contrast axial 0 625 mm images timed to opacify the arterial system  3D rendering was performed on an independent workstation  MIP reconstructions performed  Coronal reconstructions were performed of the noncontrast portion of the brain  Radiation dose length product (DLP) for this visit:  353 18 mGy-cm   This examination, like all CT scans performed in the [facility], was performed utilizing techniques to minimize radiation dose exposure, including the use of iterative  reconstruction and automated exposure control  IV Contrast:  85 mL of iohexol (OMNIPAQUE)  IMAGE QUALITY:   Motion artifact results in suboptimal evaluation FINDINGS: CERVICAL VASCULATURE AORTIC ARCH AND GREAT VESSELS:  Three-vessel configuration aortic arch  Great vessel origins not well evaluated due to motion artifact    No significant stenosis in the subclavian arteries  RIGHT VERTEBRAL ARTERY CERVICAL SEGMENT:  Normal origin  The vessel is normal in caliber throughout the neck  LEFT VERTEBRAL ARTERY CERVICAL SEGMENT:  Normal origin  The vessel is normal in caliber throughout the neck  RIGHT EXTRACRANIAL CAROTID SEGMENT:  Normal caliber common carotid artery  Calcified plaque at the bifurcation and ICA origin  Severe (70-90%) stenosis at the ICA origin  Decreased caliber of the vessel throughout the cervical segment with intimal thickening  LEFT EXTRACRANIAL CAROTID SEGMENT:  Normal caliber common carotid artery  Calcified plaque the bifurcation and ICA origin resulting in mild to moderate (50-60%) stenosis  NASCET criteria was used to determine the degree of internal carotid artery diameter stenosis  INTRACRANIAL VASCULATURE INTERNAL CAROTID ARTERIES:  Diminutive right carotid siphon likely secondary to severe proximal stenosis  Calcified plaque throughout the left carotid siphon without significant stenosis  Patent ophthalmic arteries  ANTERIOR CIRCULATION:  Absent or severely hypoplastic right A1 segment  Anterior to indicating artery visualized  No stenosis in the anterior cerebral arteries  MIDDLE CEREBRAL ARTERY CIRCULATION: Absence of opacification of the proximal right middle cerebral artery  Minimal enhancement of distal M3 branches suggesting poor collateral flow  No stenosis in the proximal right middle cerebral artery  DISTAL VERTEBRAL ARTERIES:  Normal distal vertebral arteries  Posterior inferior cerebellar artery origins are normal  Normal vertebral basilar junction  BASILAR ARTERY:  Basilar artery is normal in caliber  Normal superior cerebellar arteries  POSTERIOR CEREBRAL ARTERIES: Both posterior cerebral arteries arises from the basilar tip  Both arteries demonstrate normal enhancement  Tiny bilateral posterior communicating arteries   DURAL VENOUS SINUSES:  Limited evaluation without evidence of or thrombosis  NON VASCULAR ANATOMY BONY STRUCTURES:  No acute osseous abnormality  SOFT TISSUES OF THE NECK:  Normal  THORACIC INLET:  Unremarkable  Impression: 1  Severe (70-90%) stenosis at the origin of the right internal carotid artery  Remainder of the vessel is diminutive likely secondary to proximal stenosis  2   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  Very minimal opacification of distal M3 branches suggesting poor collateral flow  I personally discussed this study with Katy Marshall on 2019 at 7:14 PM  Workstation performed: [id number]        EKG / Monitor: Personally reviewed  Atrial fibrillation with heart rate around  beats per minute  Cardiac testing:   Results for orders placed during the hospital encounter of 19   Echo complete with contrast if indicated    Narrative [address]  [address] [address]  [phone]    Transthoracic Echocardiogram  2D, M-mode, Doppler, and Color Doppler    Study date:  2019    Patient: Ciera Horan  MR number: [id number]  Account number: [de-identified]  : 1935  Age: 81 years  Gender: Male  Status: Inpatient  Location: Bedside  Height: 66 in 66 in  Weight: 177 5 lb 177 5 lb  BP: 92/ 54 mmHg    Indications: Cerebral Thrombosis    Diagnoses: I67 9 - Cerebrovascular disease, unspecified    Sonographer:  Merline Motley, RCS  Primary Physician:  Robert Harkins MD  Referring Physician:  WYATT Styles  Group:  [facility]  Interpreting Physician:  Luisa Robb MD    SUMMARY    PROCEDURE INFORMATION:  This was a technically difficult study  Echocardiographic views were limited due to restricted patient mobility, poor patient compliance, poor acoustic window availability, and lung interference  LEFT VENTRICLE:  Ejection fraction was estimated to be around 50 %    Although no diagnostic regional wall motion abnormality was identified, this possibility cannot be completely excluded on the basis of this study  Wall thickness was mildly increased  There was mild concentric hypertrophy  Doppler parameters were consistent with abnormal left ventricular relaxation (grade 1 diastolic dysfunction)  ATRIAL SEPTUM:  No Large ASD or large PFO identified by buuble study  Buuble study over all limited  MITRAL VALVE:  There was mild regurgitation  AORTIC VALVE:  Transaortic velocity was increased due to valvular stenosis  There was mild to moderate stenosis  peak velocity 2 33 m/se, Peak gradient 23, mean 11 mm of Hg and DENZEL around 1 35 to 1 4 cm2  TRICUSPID VALVE:  There was mild regurgitation  Estimated peak PA pressure was 35 mmHg  HISTORY: PRIOR HISTORY: Neuralgia, Colon Cancer    PROCEDURE: The procedure was performed at the bedside  This was a routine study  The transthoracic approach was used  The study included complete 2D imaging, M-mode, complete spectral Doppler, and color Doppler  The heart rate was 64 bpm,  at the start of the study  Intravenous contrast (agitated saline, 10 ml) was administered to evaluate shunting  Intravenous contrast ( 10 ml) was administered  Echocardiographic views were limited due to restricted patient mobility, poor  patient compliance, poor acoustic window availability, and lung interference  This was a technically difficult study  LEFT VENTRICLE: Size was normal  Ejection fraction was estimated to be around 50 %  Although no diagnostic regional wall motion abnormality was identified, this possibility cannot be completely excluded on the basis of this study  Wall  thickness was mildly increased  There was mild concentric hypertrophy  DOPPLER: Doppler parameters were consistent with abnormal left ventricular relaxation (grade 1 diastolic dysfunction)      RIGHT VENTRICLE: The size was normal  Systolic function was normal     LEFT ATRIUM: Size was normal     ATRIAL SEPTUM: No Large ASD or large PFO identified by buuble study  Buuble study over all limited  RIGHT ATRIUM: Size was normal     MITRAL VALVE: There was normal leaflet separation  DOPPLER: The transmitral velocity was within the normal range  There was no evidence for stenosis  There was mild regurgitation  AORTIC VALVE: The valve was probably trileaflet  Leaflets exhibited mildly increased thickness, mild calcification, and normal cuspal separation  DOPPLER: Transaortic velocity was increased due to valvular stenosis  There was mild to  moderate stenosis  peak velocity 2 33 m/se, Peak gradient 23, mean 11 mm of Hg and DENZEL around 1 35 to 1 4 cm2  There was no regurgitation  TRICUSPID VALVE: DOPPLER: There was mild regurgitation  Estimated peak PA pressure was 35 mmHg  PULMONIC VALVE: DOPPLER: There was no significant regurgitation  PERICARDIUM: There was no thickening or calcification  There was no pericardial effusion  AORTA: The root exhibited normal size  SYSTEMIC VEINS: IVC: The inferior vena cava was not well visualized  SYSTEM MEASUREMENT TABLES    2D mode  AoR Diam 2D: 3 6 cm  LA Diam (2D): 4 3 cm  LA/Ao (2D): 1 19  FS (2D Teich): 22 4 %  IVSd (2D): 1 24 cm  LVDEV: 124 cmï¾³  LVESV: 68 3 cmï¾³  LVIDd(2D): 5 1 cm  LVISd (2D): 3 96 cm  LVOT Area 2D: 3 14 cmï¾²  LVPWd (2D): 1 19 cm  SV (Teich): 55 7 cmï¾³    Apical four chamber  LVEF A4C: 45 %    Unspecified Scan Mode  DENZEL Cont Eq (Peak Patrick): 1 23 cmï¾²  DENZEL Cont Eq (VTI): 1 35 cmï¾²  LVOT (VTI): 18 8 cm  LVOT Diam : 2 cm  LVOT Vmax: 928 mm/s  LVOT Vmax; Mean: 902 mm/s  Peak Grad ; Mean: 3 mm[Hg]  SV (LVOT): 62 cmï¾³  VTI;Mean: 2 mm[Hg]  MV Peak A Patrick: 607 mm/s  MV Peak E Patrick   Mean: 408 mm/s  MVA (PHT): 3 67 cmï¾²  PHT: 60 ms  Max P mm[Hg]  V Max: 2780 mm/s  Vmax: 2780 mm/s  RA Area: 19 8 cmï¾²  RA Volume: 58 5 cmï¾³  TAPSE: 2 1 cm    [employer] Accredited Echocardiography [facility]    Prepared and electronically signed by    Luisa Robb MD  Signed 18-Jul-2019 11:00:17       No results found for this or any previous visit  No results found for this or any previous visit  No results found for this or any previous visit  Dr Cy Love MD [facility] [place]      "This note has been constructed using a voice recognition system  Therefore there may be syntax, spelling, and/or grammatical errors   Please call if you have any questions  "

## 2019-07-20 NOTE — PROGRESS NOTES
MRI was reviewed  There is an appearing fracture around the pelvis or the right hip  Edema is noted within the gluteus jarrod as well as some was the gluteus medius  Feel the patient's pain is secondary to muscular type contusion as evidence on the MRI  At this point since there is no fracture patient be weight-bearing as tolerated  Of note he did have a significant stroke within the past week and will need extensive physical therapy  There is no orthopedic restriction to his activity  Orthopedics will sign off

## 2019-07-20 NOTE — ASSESSMENT & PLAN NOTE
Presented with leukocytosis of 16 K  Likely reactive  Chest x-ray without any acute infiltrate, UA without evidence of UTI  Blood cultures pending  Improving

## 2019-07-20 NOTE — PHYSICAL THERAPY NOTE
PT TREATMENT     07/20/19 0905   Pain Assessment   Pain Assessment Reddy-Baker FACES   Reddy-Baker FACES Pain Rating 6   Pain Location   (L instep with ROM, R buttock with ROM)   Restrictions/Precautions   RLE Weight Bearing Per Order WBAT   Other Precautions Chair Alarm; Bed Alarm;Cognitive; Fall Risk;Pain;O2   General   Chart Reviewed Yes   Cognition   Overall Cognitive Status Impaired   Arousal/Participation Cooperative;Lethargic   Attention Difficulty attending to directions   Following Commands Follows one step commands with increased time or repetition   Subjective   Subjective agreeable to activity   Exercises   Heelslides 15 reps; Supine;Bilateral;AROM;PROM   Hip Flexion Bilateral;AAROM;PROM;15 reps; Supine  (SLR)   Knee AROM Short Arc Quad Bilateral;AAROM;PROM;15 reps; Supine   Ankle Pumps Bilateral;PROM;AAROM; Sitting;15 reps   Neuro re-ed D1 and D2 PNF flex/ext patterns with L LE x 10 each to try to encourage muscle firing however activity PROM   Assessment   Prognosis Good   Problem List Decreased strength;Decreased endurance; Impaired balance;Decreased mobility; Decreased coordination; Impaired tone   Assessment Pt able to tolerate PT today with exercises for B LE in supine  Pt's  at rest so upright activity deferred, HR going up to 130 at times with activity  Still no AROM noted in L UE, only AROM noted in L toes with one episode of L dorsiflexion noted  Informed the CNA that pt would require a mechanical lift OOB  Plan   Treatment/Interventions ADL retraining;Functional transfer training;LE strengthening/ROM; Elevations; Therapeutic exercise; Endurance training;Patient/family training;Equipment eval/education; Bed mobility;Gait training   Progress Progressing toward goals   PT Frequency Once a day   Recommendation   Recommendation Short-term skilled PT   Licensure   [address] [place] [address] License Number  Iqra Alvarez PT  [id number]

## 2019-07-20 NOTE — PLAN OF CARE
Problem: Potential for Falls  Goal: Patient will remain free of falls  Description  INTERVENTIONS:  - Assess patient frequently for physical needs  -  Identify cognitive and physical deficits and behaviors that affect risk of falls  -  [place] fall precautions as indicated by assessment   - Educate patient/family on patient safety including physical limitations  - Instruct patient to call for assistance with activity based on assessment  - Modify environment to reduce risk of injury  - Consider OT/PT consult to assist with strengthening/mobility  Outcome: Progressing     Problem: Prexisting or High Potential for Compromised Skin Integrity  Goal: Skin integrity is maintained or improved  Description  INTERVENTIONS:  - Identify patients at risk for skin breakdown  - Assess and monitor skin integrity  - Assess and monitor nutrition and hydration status  - Monitor labs (i e  albumin)  - Assess for incontinence   - Turn and reposition patient  - Assist with mobility/ambulation  - Relieve pressure over bony prominences  - Avoid friction and shearing  - Provide appropriate hygiene as needed including keeping skin clean and dry  - Evaluate need for skin moisturizer/barrier cream  - Collaborate with interdisciplinary team (i e  Nutrition, Rehabilitation, etc )   - Patient/family teaching  Outcome: Progressing     Problem: Neurological Deficit  Goal: Neurological status is stable or improving  Description  Interventions:  - Monitor and assess patient's level of consciousness, motor function, sensory function, and level of assistance needed for ADLs  - Monitor and report changes from baseline  Collaborate with interdisciplinary team to initiate plan and implement interventions as ordered  - Provide and maintain a safe environment  - Utilize seizure precautions  - Utilize fall precautions  - Utilize aspiration precautions  - Utilize bleeding precautions  Outcome: Progressing     Problem:  Activity Intolerance/Impaired Mobility  Goal: Mobility/activity is maintained at optimum level for patient  Description  Interventions:  - Assess and monitor patient  barriers to mobility and need for assistive/adaptive devices  - Assess patient's emotional response to limitations  - Collaborate with interdisciplinary team and initiate plans and interventions as ordered  - Encourage independent activity per ability   - Maintain proper body alignment  - Perform active/passive rom as tolerated/ordered  - Plan activities to conserve energy   - Turn patient  Outcome: Progressing     Problem: Communication Impairment  Goal: Ability to express needs and understand communication  Description  Assess patient's communication skills and ability to understand information  Patient will demonstrate use of effective communication techniques, alternative methods of communication and understanding even if not able to speak  - Encourage communication and provide alternate methods of communication as needed  - Collaborate with case management/ for discharge needs  - Include patient/family/caregiver in decisions related to communication  Outcome: Progressing     Problem: Potential for Aspiration  Goal: Non-ventilated patient's risk of aspiration is minimized  Description  Assess and monitor vital signs, respiratory status, and labs (WBC)  Monitor for signs of aspiration (tachypnea, cough, rales, wheezing, cyanosis, fever)  - Assess and monitor patient's ability to swallow  - Place patient up in chair to eat if possible  - HOB up at 90 degrees to eat if unable to get patient up into chair   - Supervise patient during oral intake  - Instruct patient to take small bites  - Instruct patient to take small single sips when taking liquids    - Follow patient-specific strategies generated by speech pathologist   Outcome: Progressing     Problem: Nutrition  Goal: Nutrition/Hydration status is improving  Description  Monitor and assess patient's nutrition/hydration status for malnutrition (ex- brittle hair, bruises, dry skin, pale skin and conjunctiva, muscle wasting, smooth red tongue, and disorientation)  Collaborate with interdisciplinary team and initiate plan and interventions as ordered  Monitor patient's weight and dietary intake as ordered or per policy  Utilize nutrition screening tool and intervene per policy  Determine patient's food preferences and provide high-protein, high-caloric foods as appropriate  - Assist patient with eating   - Allow adequate time for meals   - Encourage patient to take dietary supplement as ordered  - Collaborate with clinical nutritionist   - Include patient/family/caregiver in decisions related to nutrition    Outcome: Progressing     Problem: PAIN - ADULT  Goal: Verbalizes/displays adequate comfort level or baseline comfort level  Description  Interventions:  - Encourage patient to monitor pain and request assistance  - Assess pain using appropriate pain scale  - Administer analgesics based on type and severity of pain and evaluate response  - Implement non-pharmacological measures as appropriate and evaluate response  - Consider cultural and social influences on pain and pain management  - Notify physician/advanced practitioner if interventions unsuccessful or patient reports new pain  Outcome: Progressing     Problem: SAFETY ADULT  Goal: Maintain or return to baseline ADL function  Description  INTERVENTIONS:  -  Assess patient's ability to carry out ADLs; assess patient's baseline for ADL function and identify physical deficits which impact ability to perform ADLs (bathing, care of mouth/teeth, toileting, grooming, dressing, etc )  - Assess/evaluate cause of self-care deficits   - Assess range of motion  - Assess patient's mobility; develop plan if impaired  - Assess patient's need for assistive devices and provide as appropriate  - Encourage maximum independence but intervene and supervise when necessary  ¯ Involve family in performance of ADLs  ¯ Assess for home care needs following discharge   ¯ Request OT consult to assist with ADL evaluation and planning for discharge  ¯ Provide patient education as appropriate  Outcome: Progressing  Goal: Maintain or return mobility status to optimal level  Description  INTERVENTIONS:  - Assess patient's baseline mobility status (ambulation, transfers, stairs, etc )    - Identify cognitive and physical deficits and behaviors that affect mobility  - Identify mobility aids required to assist with transfers and/or ambulation (gait belt, sit-to-stand, lift, walker, cane, etc )  - [place] fall precautions as indicated by assessment  - Record patient progress and toleration of activity level on Mobility SBAR; progress patient to next Phase/Stage  - Instruct patient to call for assistance with activity based on assessment  - Request Rehabilitation consult to assist with strengthening/weightbearing, etc   Outcome: Progressing     Problem: Knowledge Deficit  Goal: Patient/family/caregiver demonstrates understanding of disease process, treatment plan, medications, and discharge instructions  Description  Complete learning assessment and assess knowledge base  Interventions:  - Provide teaching at level of understanding  - Provide teaching via preferred learning methods  Outcome: Progressing     Problem: CARDIOVASCULAR - ADULT  Goal: Maintains optimal cardiac output and hemodynamic stability  Description  INTERVENTIONS:  - Monitor I/O, vital signs and rhythm  - Monitor for S/S and trends of decreased cardiac output i e  bleeding, hypotension  - Administer and titrate ordered vasoactive medications to optimize hemodynamic stability  - Assess quality of pulses, skin color and temperature  - Assess for signs of decreased coronary artery perfusion - ex   Angina  - Instruct patient to report change in severity of symptoms  Outcome: Progressing  Goal: Absence of cardiac dysrhythmias or at baseline rhythm  Description  INTERVENTIONS:  - Continuous cardiac monitoring, monitor vital signs, obtain 12 lead EKG if indicated  - Administer antiarrhythmic and heart rate control medications as ordered  - Monitor electrolytes and administer replacement therapy as ordered  Outcome: Progressing     Problem: METABOLIC, FLUID AND ELECTROLYTES - ADULT  Goal: Electrolytes maintained within normal limits  Description  INTERVENTIONS:  - Monitor labs and assess patient for signs and symptoms of electrolyte imbalances  - Administer electrolyte replacement as ordered  - Monitor response to electrolyte replacements, including repeat lab results as appropriate  - Instruct patient on fluid and nutrition as appropriate  Outcome: Progressing  Goal: Fluid balance maintained  Description  INTERVENTIONS:  - Monitor labs and assess for signs and symptoms of volume excess or deficit  - Monitor I/O and WT  - Instruct patient on fluid and nutrition as appropriate  Outcome: Progressing     Problem: SKIN/TISSUE INTEGRITY - ADULT  Goal: Skin integrity remains intact  Description  INTERVENTIONS  - Identify patients at risk for skin breakdown  - Assess and monitor skin integrity  - Assess and monitor nutrition and hydration status  - Monitor labs (i e  albumin)  - Assess for incontinence   - Turn and reposition patient  - Assist with mobility/ambulation  - Relieve pressure over bony prominences  - Avoid friction and shearing  - Provide appropriate hygiene as needed including keeping skin clean and dry  - Evaluate need for skin moisturizer/barrier cream  - Collaborate with interdisciplinary team (i e  Nutrition, Rehabilitation, etc )   - Patient/family teaching  Outcome: Progressing  Goal: Oral mucous membranes remain intact  Description  INTERVENTIONS  - Assess oral mucosa and hygiene practices  - Implement preventative oral hygiene regimen  - Implement oral medicated treatments as ordered  - Initiate Nutrition services referral as needed  Outcome: Progressing     Problem: MUSCULOSKELETAL - ADULT  Goal: Maintain or return mobility to safest level of function  Description  INTERVENTIONS:  - Assess patient's ability to carry out ADLs; assess patient's baseline for ADL function and identify physical deficits which impact ability to perform ADLs (bathing, care of mouth/teeth, toileting, grooming, dressing, etc )  - Assess/evaluate cause of self-care deficits   - Assess range of motion  - Assess patient's mobility; develop plan if impaired  - Assess patient's need for assistive devices and provide as appropriate  - Encourage maximum independence but intervene and supervise when necessary  - Involve family in performance of ADLs  - Assess for home care needs following discharge   - Request OT consult to assist with ADL evaluation and planning for discharge  - Provide patient education as appropriate  Outcome: Progressing  Goal: Maintain proper alignment of affected body part  Description  INTERVENTIONS:  - Support, maintain and protect limb and body alignment  - Provide pt/fam with appropriate education  Outcome: Progressing     Problem: GENITOURINARY - ADULT  Goal: Maintains or returns to baseline urinary function  Description  INTERVENTIONS:  - Assess urinary function  - Encourage oral fluids to ensure adequate hydration  - Administer IV fluids as ordered to ensure adequate hydration  - Administer ordered medications as needed  - Offer frequent toileting  - Follow urinary retention protocol if ordered  Outcome: Progressing  Goal: Absence of urinary retention  Description  INTERVENTIONS:  - Assess patient's ability to void and empty bladder  - Monitor I/O  - Bladder scan as needed  - Discuss with physician/AP medications to alleviate retention as needed  - Discuss catheterization for long term situations as appropriate   Outcome: Progressing  Goal: Urinary catheter remains patent  Description  INTERVENTIONS:  - Assess patency of urinary catheter  - If patient has a chronic meier, consider changing catheter if non-functioning  - Follow guidelines for intermittent irrigation of non-functioning urinary catheter  Outcome: Progressing     Problem: RESPIRATORY - ADULT  Goal: Achieves optimal ventilation and oxygenation  Description  INTERVENTIONS:  - Assess for changes in respiratory status  - Assess for changes in mentation and behavior  - Position to facilitate oxygenation and minimize respiratory effort  - Oxygen administration by appropriate delivery method based on oxygen saturation (per order) or ABGs  - Encourage broncho-pulmonary hygiene including cough, deep breathe, Incentive Spirometry  - Assess the need for suctioning and aspirate as needed  - Assess and instruct to report SOB or any respiratory difficulty  - Respiratory Therapy support as indicated  -Bipap PRN   Outcome: Progressing

## 2019-07-20 NOTE — ASSESSMENT & PLAN NOTE
Creatinine 1 5 on presentation  Likely secondary to prerenal, rhabdomyolysis, urinary retention  Improving with IV fluids  Continue to monitor intake and output

## 2019-07-20 NOTE — PROGRESS NOTES
[facility] [facility] Progress Note  Patient: Radha Magana 81 y o  male   MRN: [id number]  PCP: Grayson Runner, MD  Unit/Bed#: ICU 12 Encounter: [id number]  Date Of Visit: 07/20/19    Problem List:    Principal Problem:    Cerebrovascular accident (CVA) due to thrombosis of right middle cerebral artery ([address] )  Active Problems:    Atrial fibrillation with RVR ([facility] )    Traumatic rhabdomyolysis ([facility] )    Right hip pain    Urinary retention    Insomnia    Depression with anxiety    Impaired swallowing    Symptomatic stenosis of right carotid artery      Assessment & Plan:    Atrial fibrillation with RVR [facility])  Assessment & Plan  New onset, unclear if contributary or subsequent to CVA  Noted to be in new onset rapid AFib with associated tachypnea on 07/19  Heart rate control is better with Cardizem drip  Blood pressure remains acceptable  Status post digoxin loading on 07/19  Cardiology evaluation appreciated  Monitor electrolytes, replete potassium  Monitor intake and output  Follow up repeat CT to evaluate for micro hemorrhage prior to starting anticoagulation  Will discuss anticoagulation options with Neurology and Cardiology    * Cerebrovascular accident (CVA) due to thrombosis of right middle cerebral artery [facility])  Assessment & Plan  Patient brought in by EMS after being found on floor with altered mental status by his step-daughter  Patient was last seen normal on Monday, patient was then found down on the bathroom floor with left-sided weakness and altered mentation 7/17,   Patient was a stroke alert  NIH scale 13 on admission  Patient displayed right sided gaze preference, left sided weakness, left sided facial droop  Speech incoherent  CT head, CTA head neck revealed "Severe (70-90%) stenosis at the origin of the right internal carotid artery   Remainder of the vessel is diminutive likely secondary to proximal stenosis   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   Very minimal opacification of distal M3 branches suggesting poor collateral flow"  Endovascular Neurology was contacted by ED, who declined intervention given age and unknown onset   Dr Luca Aviles with Neurology was contacted, patient not a tPA candidate  Recommended 300mg rectal ASA and stroke pathway with close neurologic monitoring overnight given large infarct with possibility of swelling as timing of infarct is unknown  MRI of the brain - Large acute right MCA territory infarction primarily involving the temporal lobe with lesser degree of involvement of the inferolateral frontal lobe and anterior parietal lobe   Development of acute microhemorrhage within the right lenticular nucleus and adjacent temporal lobe  2D echo-EF 39%, grade 1 diastolic dysfunction  No shunt noted  CVA most likely secondary to symptomatic high-grade right carotid stenosis, but cannot rule out contribution from AFib  Neuro remained stable  , hemoglobin A1c 6  Allow permissive hypertension with SBP goal 130-140  Hold sedating medications  Repeat CT head today to evaluate for stability, CT scan PRN with neurologic changes  NPO until passes swallow therapy  Continue rectal aspirin  Lipitor 40 mg daily for now but increase to 80 mg once rhabdomyolysis is improved, follow-up CPK  PT/OT/ST  Vascular surgery consultation noted, recommended follow-up as outpatient for carotid endarterectomy  Patient will need anticoagulation in light of AFib based on CT scan findings  Neurology following      Urinary retention  Assessment & Plan  Bladder scan noted to have more than 800 cc of urinary retention  Setting of CVA, a KI, rhabdomyolysis, pelvic fracture  Cohen catheter was placed  Voiding trial when ambulating    Right hip pain  Assessment & Plan  Patient reported pain on the right groin    Related to patient's fall  X-ray revealed possibility of nondisplaced right superior pubic ramus fracture  Orthopedic input appreciated, recommended MRI of the right pelvis/hip  MRI-no evidence of fracture but revealed Asymmetric hypertrophy and edema within the right gluteus jarrod  Follow up orthopedic recommendation  PT OT as tolerated    Traumatic rhabdomyolysis [facility])  Assessment & Plan  Patient was found lying in the floor for prolonged preop  CPK noted to be 5381 on admission, increase to 6896 now and downtrending  MRI revealed asymmetric hypertrophy and edema within the right gluteus jarrod, likely cause of rhabdomyolysis  Continue IV fluids, monitor renal function, intake and output  PT/OT as tolerated    Acute kidney injury (HCC)resolved as of 7/20/2019  Assessment & Plan  Creatinine 1 5 on presentation  Likely secondary to prerenal, rhabdomyolysis, urinary retention  Improving with IV fluids  Continue to monitor intake and output    Leukocytosisresolved as of 7/20/2019  Assessment & Plan  Presented with leukocytosis of 16 K  Likely reactive  Chest x-ray without any acute infiltrate, UA without evidence of UTI  Blood cultures pending  Improving    Elevated lactic acid levelresolved as of 7/20/2019  Assessment & Plan  Lactic acid 3 3 on presentation, subsequently improved with IV fluids    Depression with anxiety  Assessment & Plan  Hold home medication mirtazapine and seroquel    Insomnia  Assessment & Plan  Hold home medication mirtazapine and seroquel    Repeat CT scan noted, expected involving changes without any new hemorrhage or other findings  Discussed with Dr Lonnie Ramos from Neurology  Okay to start anticoagulation  Anticoagulation options were discussed  Recommended to start with IV heparin stroke protocol  Discussed with Cardiology, Dr Albert Reyes, in agreement with IV heparin  Will start patient on IV heparin  Discussed with pharmacy regarding the protocol  VTE Pharmacologic Prophylaxis:   Pharmacologic: Heparin  Mechanical VTE Prophylaxis in Place: Yes    Patient Centered Rounds: I have performed bedside rounds with nursing staff today      Discussions with Specialists or Other Care Team Provider:  Yes    Education and Discussions with Family / Patient:  Various family members at the bedside on     Time Spent for Care: 45 minutes  More than 50% of total time spent on counseling and coordination of care as described above  Current Length of Stay: 3 day(s)    Current Patient Status: Inpatient   Certification Statement: The patient will continue to require additional inpatient hospital stay due to Acute CVA, AFib    Discharge Plan:  Anticipate rehab    Code Status: Level 3 - DNAR and DNI      Subjective:   Lying comfortably in bed  Easily aroused to touch  Appears mildly tachypneic but improved from yesterday  Denies any chest pain, shortness of breath  Right hip pain is stable    Objective:   Comfortable  Vitals:   Temp (24hrs), Av 7 °F (36 5 °C), Min:97 5 °F (36 4 °C), Max:98 °F (36 7 °C)    Temp:  [97 5 °F (36 4 °C)-98 °F (36 7 °C)] 97 6 °F (36 4 °C)  HR:  [] 118  Resp:  [25-38] 36  BP: ()/(50-92) 137/76  SpO2:  [93 %-98 %] 94 %  Body mass index is 22 71 kg/m²  Input and Output Summary (last 24 hours): Intake/Output Summary (Last 24 hours) at 2019 1031  Last data filed at 2019 1001  Gross per 24 hour   Intake 1495 ml   Output 1825 ml   Net -330 ml       Physical Exam:     Physical Exam   Constitutional: He is oriented to person, place, and time  He appears well-developed  No distress  HENT:   Head: Normocephalic and atraumatic  Eyes: Pupils are equal, round, and reactive to light  Conjunctivae are normal    Neck: Normal range of motion  Neck supple  Cardiovascular: Normal rate  An irregularly irregular rhythm present  Murmur heard  Pulmonary/Chest: Effort normal  No respiratory distress  He has decreased breath sounds  He has no wheezes  He has no rhonchi  He has no rales (At bases)  He exhibits no tenderness  Coarse breath sounds   Abdominal: Soft  Bowel sounds are normal  He exhibits no distension   There is no tenderness  There is no rebound and no guarding  Musculoskeletal: He exhibits no edema  Right hip: He exhibits tenderness  Neurological: He is alert and oriented to person, place, and time  No cranial nerve deficit  Stable  Left-sided hemiplegia, left left-sided sensory loss  Mild dysarthria, dysphagia   Skin: Skin is warm and dry  No rash noted  Additional Data:     Labs:    Results from last 7 days   Lab Units 07/20/19  0430   WBC Thousand/uL 8 20   HEMOGLOBIN g/dL 10 3*   HEMATOCRIT % 31 8*   PLATELETS Thousands/uL 172   NEUTROS PCT % 67   LYMPHS PCT % 18   MONOS PCT % 12   EOS PCT % 1     Results from last 7 days   Lab Units 07/20/19  0430   POTASSIUM mmol/L 3 4*   CHLORIDE mmol/L 110*   CO2 mmol/L 23   BUN mg/dL 17   CREATININE mg/dL 1 07   CALCIUM mg/dL 7 9*   ALK PHOS U/L 76   ALT U/L 35   AST U/L 74*     Results from last 7 days   Lab Units 07/17/19  1905   INR  1 05       * I Have Reviewed All Lab Data Listed Above  * Additional Pertinent Lab Tests Reviewed: All Labs Within Last 24 Hours Reviewed      Imaging:  Imaging Reports Reviewed Today Include:  Chest x-ray, CT      Recent Cultures (last 7 days):     Results from last 7 days   Lab Units 07/17/19  2100   BLOOD CULTURE  No Growth at 48 hrs  No Growth at 48 hrs         Last 24 Hours Medication List:     Current Facility-Administered Medications:  acetaminophen 325 mg Rectal Q4H PRN Overton MD Parker    aspirin 300 mg Rectal Daily Josiah Steinberg PA-C    atorvastatin 40 mg Oral QPM WYATT Hunter    digoxin 62 5 mcg Intravenous Daily Delia Perkins DO    diltiazem 1-15 mg/hr Intravenous Titrated Overton MD Parker Last Rate: 5 mg/hr (07/19/19 2134)   furosemide 20 mg Intravenous Once Overton MD Parker    heparin (porcine) 5,000 Units Subcutaneous Q8H [address] WYATT Hunter    HYDROmorphone 0 2 mg Intravenous Q4H PRN Overton MD Parker    ipratropium 0 5 mg Nebulization Q6H Overton MD Parker    Labetalol HCl 10 mg Intravenous Q6H PRN Ileene WYATT Rodriguez    levalbuterol 1 25 mg Nebulization Q6H Mario Gutierrez MD    And        sodium chloride 3 mL Nebulization Q6H Mario Gutierrez MD    pneumococcal 13-valent conjugate vaccine 0 5 mL Intramuscular Prior to discharge WYATT Hunter    potassium chloride 20 mEq Intravenous Once Mario Gutierrez MD Last Rate: 20 mEq (07/20/19 0922)   sodium chloride 75 mL/hr Intravenous Continuous Mario Gutierrez MD Last Rate: 75 mL/hr (07/20/19 2560)          Today, Patient Was Seen By: Mario Gutierrez MD    ** Please Note: "This note has been constructed using a voice recognition system  Therefore there may be syntax, spelling, and/or grammatical errors   Please call if you have any questions  "**

## 2019-07-20 NOTE — ASSESSMENT & PLAN NOTE
Patient reported pain on the right groin    Related to patient's fall  X-ray revealed possibility of nondisplaced right superior pubic ramus fracture  Orthopedic input appreciated, recommended MRI of the right pelvis/hip  MRI-no evidence of fracture but revealed Asymmetric hypertrophy and edema within the right gluteus jarrod  PT OT as tolerated

## 2019-07-20 NOTE — ASSESSMENT & PLAN NOTE
80 y/o M with CKD, recent hip fracture, hx of colon cancer, admitted under stroke pathway after presenting to ER, found on floor after unknown downtime (+rhabdo) and w/ left sided hemiparesis and altered mental status  CT scan +large R MCA ischemic infarct and severe R carotid stenosis, also w/ note of new onset Afib w/ RVR  Vascular consult  Diagnostics:  MRI brain- Large acute R MCA territory infarction primarily involving the temporal lobe with lesser degree of involvement of the inferolateral frontal lobe and anterior parietal lobe  Development of acute microhemorrhage within the right lenticular nucleus and adjacent temporal lobe  CTA head/neck- severe 70-99% stenosis at the origin of the right internal carotid  Remainder of the vessel is diminutive likely secondary to proximal stenosis  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 with very minimal opacification of distal M3 branches suggesting poor collateral flow  Plan:  -Large right MCA infarct  Neurology input appreciated, likely secondary to symptomatic high grade right carotid stenosis, but cannot completely rule out new onset Afib w/ RVR as contributory  -Patient continues with complete left hemiplegia  Due to persistent deficit he is not a candidate for vascular intervention at this time  Recommend intensive rehab   -Will arrange outpt followup in 6-8 weeks to assess improvement in hemiparesis  If significant improve/gains made in rehab then will consider R CEA  -Continue medical management with aspirin + statin  Eliquis for Afib if repeat CT scan negative for micro hemorrhage  -Thank you for allowing us to participate in the care of this patient    If we can be of further assistance during this hospitalization please notify vascular

## 2019-07-20 NOTE — ASSESSMENT & PLAN NOTE
Bladder scan noted to have more than 800 cc of urinary retention  Setting of CVA, ELOISE, rhabdomyolysis, pelvic fracture    Cohen catheter was placed  Voiding trial when ambulating or more mobile

## 2019-07-20 NOTE — PROGRESS NOTES
Progress Note - Peter Massey 1935, 81 y o  male MRN: [id number]    Unit/Bed#: ICU 12 Encounter: [id number]    Primary Care Provider: Sheldon Fan MD   Date and time admitted to hospital: 7/17/2019  6:48 PM        Symptomatic stenosis of right carotid artery  Assessment & Plan  82 y/o M with CKD, recent hip fracture, hx of colon cancer, admitted under stroke pathway after presenting to ER, found on floor after unknown downtime (+rhabdo) and w/ left sided hemiparesis and altered mental status  CT scan +large R MCA ischemic infarct and severe R carotid stenosis, also w/ note of new onset Afib w/ RVR  Vascular consult  Diagnostics:  MRI brain- Large acute R MCA territory infarction primarily involving the temporal lobe with lesser degree of involvement of the inferolateral frontal lobe and anterior parietal lobe  Development of acute microhemorrhage within the right lenticular nucleus and adjacent temporal lobe  CTA head/neck- severe 70-99% stenosis at the origin of the right internal carotid  Remainder of the vessel is diminutive likely secondary to proximal stenosis  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 with very minimal opacification of distal M3 branches suggesting poor collateral flow  Plan:  -Large right MCA infarct  Neurology input appreciated, likely secondary to symptomatic high grade right carotid stenosis, but cannot completely rule out new onset Afib w/ RVR as contributory  -Patient continues with complete left hemiplegia  Due to persistent deficit he is not a candidate for vascular intervention at this time  Recommend intensive rehab   -Will arrange outpt followup in 6-8 weeks to assess improvement in hemiparesis  If significant improve/gains made in rehab then will consider R CEA  -Continue medical management with aspirin + statin    Eliquis for Afib if repeat CT scan negative for micro hemorrhage  -Thank you for allowing us to participate in the care of this patient  If we can be of further assistance during this hospitalization please notify vascular        Subjective:  Patient seen in bed  Remains with dense left hemiparesis  Denies any pain  Vitals:  /86   Pulse (!) 119   Temp 97 6 °F (36 4 °C) (Temporal)   Resp (!) 33   Ht 5' 6" (1 676 m)   Wt 71 8 kg (158 lb 4 6 oz)   SpO2 96%   BMI 22 71 kg/m²     I/Os:  I/O last 3 completed shifts: In: 2968 3 [I V :2968 3]  Out: 2835 [Urine:2835]  No intake/output data recorded      Lab Results and Cultures:   Lab Results   Component Value Date    WBC 8 20 07/20/2019    HGB 10 3 (L) 07/20/2019    HCT 31 8 (L) 07/20/2019    MCV 95 07/20/2019     07/20/2019     Lab Results   Component Value Date    CALCIUM 7 9 (L) 07/20/2019    K 3 4 (L) 07/20/2019    CO2 23 07/20/2019     (H) 07/20/2019    BUN 17 07/20/2019    CREATININE 1 07 07/20/2019     Lab Results   Component Value Date    INR 1 05 07/17/2019    PROTIME 11 0 07/17/2019        Blood Culture:   Lab Results   Component Value Date    BLOODCX No Growth at 24 hrs  07/17/2019    BLOODCX No Growth at 24 hrs  07/17/2019   ,   Urinalysis:   Lab Results   Component Value Date    COLORU Yellow 07/17/2019    CLARITYU Slightly Cloudy 07/17/2019    SPECGRAV 1 020 07/17/2019    PHUR 5 5 07/17/2019    LEUKOCYTESUR Negative 07/17/2019    NITRITE Negative 07/17/2019    GLUCOSEU Negative 07/17/2019    KETONESU Negative 07/17/2019    BILIRUBINUR Negative 07/17/2019    BLOODU Large (A) 07/17/2019   ,   Urine Culture: No results found for: URINECX,   Wound Culure: No results found for: WOUNDCULT    Medications:  Current Facility-Administered Medications   Medication Dose Route Frequency    acetaminophen (TYLENOL) rectal suppository 325 mg  325 mg Rectal Q4H PRN    aspirin rectal suppository 300 mg  300 mg Rectal Daily    atorvastatin (LIPITOR) tablet 40 mg  40 mg Oral QPM    digoxin (LANOXIN) injection 62 5 mcg  62 5 mcg Intravenous Daily    diltiazem (CARDIZEM) 125 mg in sodium chloride 0 9 % 125 mL infusion  1-15 mg/hr Intravenous Titrated    heparin (porcine) subcutaneous injection 5,000 Units  5,000 Units Subcutaneous Q8H [facility]    HYDROmorphone (DILAUDID) injection 0 2 mg  0 2 mg Intravenous Q4H PRN    ipratropium (ATROVENT) 0 02 % inhalation solution 0 5 mg  0 5 mg Nebulization Q6H    Labetalol HCl (NORMODYNE) injection 10 mg  10 mg Intravenous Q6H PRN    levalbuterol (XOPENEX) inhalation solution 1 25 mg  1 25 mg Nebulization Q6H    And    sodium chloride 0 9 % inhalation solution 3 mL  3 mL Nebulization Q6H    pneumococcal 13-valent conjugate vaccine (PREVNAR-13) IM injection 0 5 mL  0 5 mL Intramuscular Prior to discharge    sodium chloride 0 9 % infusion  75 mL/hr Intravenous Continuous       Imaging:  MRI brain  IMPRESSION:  Large acute right MCA territory infarction primarily involving the temporal lobe with lesser degree of involvement of the inferolateral frontal lobe and anterior parietal lobe   Consistent with CT/CTA     Development of acute microhemorrhage within the right lenticular nucleus and adjacent temporal lobe    CTA head/neck  IMPRESSION:   1   Severe (70-90%) stenosis at the origin of the right internal carotid artery   Remainder of the vessel is diminutive likely secondary to proximal stenosis  2   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   Very minimal opacification of distal M3 branches suggesting poor collateral flow      Physical Exam:    General appearance: alert and oriented, in no acute distress  Skin: Skin color, texture, turgor normal  No rashes or lesions  Neurologic: Dense left sided hemiparesis with no movement to the LUE or LLE, left sided tongue deviation, A&Ox3  Head: Normocephalic, without obvious abnormality, atraumatic  Eyes: Right gaze, sclerae anicteric  Neck: no JVD and supple, symmetrical, trachea midline  Lungs: Rhonchi throughout, respiration even and unlabored  Heart: Irregularly irregular  Abdomen: soft, non-tender; bowel sounds normal; no masses,  no organomegaly and obese  Extremities: extremities normal, warm and well-perfused; no cyanosis, clubbing, or edema and left hemiplegia        WYATT Dixon  7/20/2019  [facility]  [phone]

## 2019-07-20 NOTE — ASSESSMENT & PLAN NOTE
New onset, unclear if contributary or subsequent to CVA  Noted to be in new onset rapid AFib with associated tachypnea on 07/19  Heart rate improved with Cardizem drip  Blood pressure remains acceptable  Status post digoxin loading on 07/19  Heart rate is better after digoxin    cardizem gtt stopped  · Cardiology evaluation appreciated  · Monitor intake and output, Continue diuresis as needed  · Started on IV heparin, start AC once able to take PO  · Change IV to PO metoprolol once able to take in PO

## 2019-07-20 NOTE — OCCUPATIONAL THERAPY NOTE
OT TREATMENT       07/20/19 1233   Restrictions/Precautions   RLE Weight Bearing Per Order WBAT   Other Precautions Cognitive; Chair Alarm; Bed Alarm; Fall Risk;Pain  (L neglect, L hemipareisis)   General   Family/Caregiver Present Yes, present for initiation of session then left   Pain Assessment   Pain Assessment Reddy-Baker FACES   Reddy-Baker FACES Pain Rating 4  (Pt sleeping during session)   Bed Mobility   Additional Comments Unable to attempt due to pt lethargy  Transfers   Additional Comments Unable to attempt due to pt lethargy  ROM - Left Upper Extremities    L Shoulder PROM; Flexion; Horizontal ABduction; External rotation; Internal rotation   L Elbow PROM;Elbow flexion;Elbow extension   L Wrist PROM; Wrist flexion;Wrist extension   L Hand PROM; Thumb; Index finger; Long finger;Ring finger;Little finger  (Full flexion all fingers, opposition thumb)   L Position Supine  (Head of bed raised)   L Weight/Reps/Sets 2 sets of 10 each exercise   LUE ROM Comment Pt supine in bed with head raised  Sleeping intermittently throughout exercises despite verbal cues from therapist to open eyes and attempt muscle activation  No active contraction noted throughout session, however, may have been limited due to pt lethargy  Neuromuscular Education   Taping Tapping/rubbing R bicep, no response at this time  Cognition   Overall Cognitive Status Impaired   Arousal/Participation Lethargic;Persistent stimuli required   Attention Difficulty attending to directions   Orientation Level Oriented to person  (Responds to name)   Perception   Left Attention Verbal cues for pt to look to therapist standing on L side of pt  Able to locate therapist 1x  Unable to locate clock on L side of wall  OT educated pt family on sitting/standing to left of pt to encourage awareness of L side  Pt's family verbalized understanding     Activity Tolerance   Activity Tolerance Patient limited by fatigue   Medical Staff Made Aware Nurse Vero Aguilar approved of treatment   Assessment   Assessment Pt cooperative but lethargic throughout session  Pt falling asleep several times throughout session despite verbal cues from therapist encouraging active participation  No active movement noted in LUE however no contractures or tone present at this time  Pt will continue to benefit from skilled occupational therapy services to increase independence and safety with ADLs and functional mobility  Plan   Treatment Interventions UE strengthening/ROM; Endurance training; Activityengagement   Recommendation   OT Discharge Recommendation Short Term Rehab   Modified [place] Scale   Modified [place] Scale 5   Licensure   [place] License Number  Isiah Bridges [address], OTR/L [id number]

## 2019-07-20 NOTE — ASSESSMENT & PLAN NOTE
Patient was found lying in the floor for prolonged preop  CPK noted to be 5381 on admission, increase to 6896 and now downtrending  MRI revealed asymmetric hypertrophy and edema within the right gluteus jarrod, likely cause of rhabdomyolysis  Continue IV fluids, monitor renal function, intake and output  PT/OT as tolerated

## 2019-07-21 PROBLEM — R06.03 RESPIRATORY DISTRESS: Status: ACTIVE | Noted: 2019-01-01

## 2019-07-21 NOTE — OCCUPATIONAL THERAPY NOTE
Attempted OT treatment, family in room requesting to speak to nurse and pt very lethargic at this time  Nurse notified  Will reattempt at later time      Dann Moody, Isiah [address], OTR/L [id number]

## 2019-07-21 NOTE — ASSESSMENT & PLAN NOTE
Multifactorial secondary to muscular weakness, difficulty in clearing secretion, atelectasis,  volume overload  Chest x-ray with small left-sided effusion without any acute findings  Patient is afebrile  Remains with significant secretion requiring frequent suctioning  Percussion vest and chest PT  Reconfirmed with the patient regarding DNR/DNI status  Continue BiPAP as needed, Patient does not prefer BiPAP but willing to continue at present    Continue aspiration precaution  Monitor intake and output  Continue IV Lasix as needed to maintain euvolemia

## 2019-07-21 NOTE — SPEECH THERAPY NOTE
Speech Language/Pathology  Canceled Therapy Note    Planned follow up for dysphagia to determine safety of initiating PO intake  Notes from therapy as well as MD indicate increased lethargy and overall decline  Spoke with Clement Alan RN to determine if patient is appropriate for follow up today  She reported that patient is sound asleep, off Bi Pap 1 1/2 hours ago, not able to participate in treatments today  Passive ROM in PT per RN  RN requests canceling following up today and return on 7/22/19      ELENA Kwong , [address] [facility] Pathologist  [phone]

## 2019-07-21 NOTE — PROGRESS NOTES
Progress Note - Cardiology   [facility]     Taryn Jenkins Sr 81 y o  male MRN: [id number]  : 1935  Unit/Bed#: ICU 12 Encounter: [id number]    Assessment and Plan:     1  Atrial fibrillation with rapid ventricular rate  Noted here 1st time  May be has history of paroxysmal atrial fibrillation  Patient has limited medical follow-up in the past   Continue Cardizem drip for rate control along with digoxin  Permissive hypertension is desired  Heart rate  beats per minute  2  CVA  Most likely due to thrombosis of right middle cerebral artery  Follow-up by Neurology significant improvement  Family is considering comfort care as patient does not wish to have intubation or pack to placement    3  Dyslipidemia  Continue statin  4  Right hip pain  Patient is still reporting some pain  Orthopedics note noted  No obvious pelvis fracture  5  Traumatic rhabdomyolysis  Patient was lying on the floor for prolonged period of time  CPK was elevated  Monitor electrolytes  Continue gentle hydration EF around 50%  6  History of acute kidney injury  Serum creatinine now improving  Continue other Rx as before  Spent lot of time discussing with family    Subjective / Objective:   Patient seen and evaluated  Patient's overall condition has deteriorated  He is still able to swell the secretions  He is now currently on BiPAP  He does not want to be intubated or have a PEG tube placement     Discussion with family they are considering comfort care  Vitals: Blood pressure 150/85, pulse 104, temperature 98 9 °F (37 2 °C), temperature source Temporal, resp  rate (!) 40, height 5' 6" (1 676 m), weight 74 2 kg (163 lb 9 3 oz), SpO2 95 %  Vitals:    19 0200 19 0600   Weight: 71 8 kg (158 lb 4 6 oz) 74 2 kg (163 lb 9 3 oz)     Body mass index is 23 47 kg/m²    BP Readings from Last 3 Encounters:   19 150/85   18 120/70   18 130/80 Orthostatic Blood Pressures      Most Recent Value   Blood Pressure  150/85 filed at 07/21/2019 1200   Patient Position - Orthostatic VS  Lying filed at 07/21/2019 1200        I/O       07/18 0701 - 07/19 0700 07/19 0701 - 07/20 0700 07/20 0701 - 07/21 0700    P  O    0    I V  (mL/kg) 2759 2 (34 1) 1768 3 (24 6)     IV Piggyback 500      Total Intake(mL/kg) 3259 2 (40 2) 1768 3 (24 6) 0 (0)    Urine (mL/kg/hr) 2050 (1 1) 2285 (1 3) 1925 (3 1)    Total Output 2050 2285 1925    Net +1209 2 -516 7 -1925               Invasive Devices     Peripheral Intravenous Line            Peripheral IV 07/17/19 Left Hand 3 days    Peripheral IV 07/20/19 Left Forearm less than 1 day    Peripheral IV 07/21/19 Right;Dorsal (posterior) Forearm less than 1 day          Drain            Urethral Catheter Non-latex 16 Fr  3 days                  Intake/Output Summary (Last 24 hours) at 7/21/2019 1319  Last data filed at 7/21/2019 1200  Gross per 24 hour   Intake 2858 18 ml   Output 2125 ml   Net 733 18 ml         Physical Exam:   Physical Exam   Constitutional: He appears well-developed and well-nourished  No distress  HENT:   Head: Normocephalic and atraumatic  Eyes: Pupils are equal, round, and reactive to light  Neck: Neck supple  No JVD present  No thyromegaly present  Cardiovascular: S1 normal and S2 normal  An irregularly irregular rhythm present  Exam reveals no gallop, no S3, no S4, no distant heart sounds and no friction rub  Murmur heard  Systolic murmur is present  S1-S2 irregular   Pulmonary/Chest: He is in respiratory distress  He has no wheezes  He has no rales  He exhibits no tenderness  Bilateral coarse breath sounds with crackles could be transmitted sounds   Abdominal: Soft  He exhibits no distension  There is no tenderness  Musculoskeletal: He exhibits no edema or deformity  Lymphadenopathy:     He has no cervical adenopathy     Neurological:   Patient is lethargic, pleasantly confused  Still cannot move left arm and right leg  Cannot swallow secretions   Skin: Skin is warm and dry  No rash noted  He is not diaphoretic  No pallor  Psychiatric: He has a normal mood and affect   Judgment normal        Medications/ Allergies:       Current Facility-Administered Medications:  acetaminophen 325 mg Rectal Q4H PRN Leslie Canales MD    aspirin 300 mg Rectal Daily Josiah Steinberg PA-C    atorvastatin 40 mg Oral QPM WYATT Hunter    dextrose 5 % and sodium chloride 0 9 % with KCl 20 mEq/L 75 mL/hr Intravenous Continuous [place] MD Parker Last Rate: 75 mL/hr (07/21/19 1157)   digoxin 125 mcg Intravenous Daily Selena Chavez MD    diltiazem 1-15 mg/hr Intravenous Titrated Selena Chavez MD Last Rate: 7 5 mg/hr (07/21/19 0701)   heparin (porcine) 300-2,000 Units/hr Intravenous Titrated [place] MD Parker Last Rate: 600 Units/hr (07/21/19 1130)   HYDROmorphone 0 2 mg Intravenous Q4H PRN [place] MD Parker    ipratropium 0 5 mg Nebulization Q6H [place] MD Parker    Labetalol HCl 10 mg Intravenous Q6H PRN WYATT Hunter    levalbuterol 1 25 mg Nebulization Q6H [place] MD Parker    And        sodium chloride 3 mL Nebulization Q6H [place] MD Parker    pneumococcal 13-valent conjugate vaccine 0 5 mL Intramuscular Prior to discharge WYATT Ambriz        acetaminophen 325 mg Q4H PRN   HYDROmorphone 0 2 mg Q4H PRN   Labetalol HCl 10 mg Q6H PRN   pneumococcal 13-valent conjugate vaccine 0 5 mL Prior to discharge     Allergies   Allergen Reactions    Hydrocodone          Labs:   Troponins:  Results from last 7 days   Lab Units 07/21/19  0624 07/20/19  0430 07/19/19  0518   CK TOTAL U/L 2,071* 2,506* 3,324*   CK MB INDEX % <1 0 <1 0 <1 0       CBC with diff:  Results from last 7 days   Lab Units 07/21/19  0624 07/20/19  0430 07/19/19  0518 07/18/19  0506 07/17/19  1905   WBC Thousand/uL 9 80 8 20 10 38* 14 71* 16 18*   HEMOGLOBIN g/dL 11 2* 10 3* 10 2* 11 3* 12 3 HEMATOCRIT % 34 5* 31 8* 31 6* 35 3* 38 3   MCV fL 95 95 95 95 95   PLATELETS Thousands/uL 246 172 164 196 213   MCH pg 30 7 30 7 30 7 30 4 30 6   MCHC g/dL 32 5 32 4 32 3 32 0 32 1   RDW % 15 8* 16 2* 16 7* 16 6* 16 1*   MPV fL 9 6 9 6 10 1 9 3 9 5   NRBC AUTO /100 WBCs 0 0 0 0 0       CMP:  Results from last 7 days   Lab Units 07/21/19  0624 07/20/19  0430 07/19/19  0518 07/18/19  0506 07/17/19  1905   SODIUM mmol/L 139 144 144 143 138   POTASSIUM mmol/L 3 8 3 4* 3 7 3 8 4 7   CHLORIDE mmol/L 107 110* 111* 108 104   CO2 mmol/L 19* 23 18* 25 24   ANION GAP mmol/L 13 11 15* 10 10   BUN mg/dL 19 17 19 19 17   CREATININE mg/dL 1 02 1 07 1 21 1 42* 1 55*   CALCIUM mg/dL 8 9 7 9* 7 7* 7 9* 8 0*   AST U/L  --  74* 72*  --  78*   ALT U/L  --  35 24  --  20   ALK PHOS U/L  --  76 68  --  92   TOTAL PROTEIN g/dL  --  6 1* 6 4  --  6 7   ALBUMIN g/dL  --  2 7* 3 2*  --  3 0*   TOTAL BILIRUBIN mg/dL  --  0 90 0 80  --  0 40   EGFR ml/min/1 73sq m 68 64 55 46 41       Magnesium:  Results from last 7 days   Lab Units 07/21/19  0624 07/20/19  0430 07/19/19  0518 07/18/19  0506 07/17/19  1905   MAGNESIUM mg/dL 2 3 2 0 2 0 2 0 2 0     Coags:  Results from last 7 days   Lab Units 07/21/19  1102 07/21/19  0624 07/20/19  2218 07/20/19  1530 07/17/19  1905   PTT seconds 27 29 27 31 25   INR   --   --   --   --  1 05     TSH:  Results from last 7 days   Lab Units 07/17/19  1905   TSH 3RD GENERATON uIU/mL 2 007     Lipid Profile:  Results from last 7 days   Lab Units 07/18/19  0506   CHOLESTEROL mg/dL 204*   TRIGLYCERIDES mg/dL 83   HDL mg/dL 47   LDL CALC mg/dL 140*     Hgb A1c:  Results from last 7 days   Lab Units 07/18/19  0506   HEMOGLOBIN A1C % 6 0         Imaging & Testing   I have personally reviewed pertinent reports  Xr Chest Portable    Result Date: 7/19/2019  Narrative: CHEST INDICATION:   Shortness of breath   COMPARISON:  7/17/2019 EXAM PERFORMED/VIEWS:  XR CHEST PORTABLE Images: 2 (duplicate image re-windowed with line enhancement technique) FINDINGS: Cardiomediastinal silhouette appears stable  Calcified uncoiled thoracic aorta  Mild prominence of the ascending aortic contour which could represent tortuosity  CT imaging would be required to evaluate aortic size if clinically relevant  The lungs are grossly clear  Slight elevation of the right diaphragm  No pneumothorax or pleural effusion  Osseous structures appear within normal limits for patient age  Surgical clips right upper quadrant  Impression: No acute cardiopulmonary disease  See comments  Workstation performed: [id number]     Xr Chest 1 View Portable    Result Date: 7/18/2019  Narrative: CHEST INDICATION:   htn  Stroke COMPARISON:  None EXAM PERFORMED/VIEWS:  XR CHEST PORTABLE 1 image FINDINGS: Cardiomediastinal silhouette appears unremarkable  The lungs are clear  No pneumothorax or pleural effusion  Osseous structures appear within normal limits for patient age  Impression: No acute cardiopulmonary disease  Workstation performed: [id number]     Ct Head Wo Contrast    Result Date: 7/20/2019  Narrative: CT BRAIN - WITHOUT CONTRAST INDICATION:   Intracranial hemorrhage; Stroke; Follow-up prior to starting anticoagulation  COMPARISON:  MR brain July 18, 2019, CTA stroke alert July 17, 2019 and CT brain July 17, 2019  TECHNIQUE:  CT examination of the brain was performed  In addition to axial images, coronal 2D reformatted images were created and submitted for interpretation  Radiation dose length product (DLP) for this visit:  997 84 mGy-cm   This examination, like all CT scans performed in the [facility], was performed utilizing techniques to minimize radiation dose exposure, including the use of iterative  reconstruction and automated exposure control  IMAGE QUALITY:  Diagnostic  FINDINGS: PARENCHYMA:  Again identified is a hyperdense right middle cerebral artery  There is an associated right MCA territory infarction    Increased density in the right basal ganglia, most compatible with petechial hemorrhage  There is mild mass effect with sulcal effacement  Midline shift of approximately 3 mm to the left  This has not significantly changed  Compression of the right lateral ventricle  Decreased attenuation in the periventricular regions and centrum semiovale bilaterally, consistent with chronic small vessel ischemic change  Prominence of the extra-axial space adjacent to the anterior left temporal lobe, most compatible with arachnoid cyst  VENTRICLES AND EXTRA-AXIAL SPACES:  Stable mild mass effect from right MCA territory infarction with sulcal effacement and compression of the right lateral ventricle  Midline shift of approximately 3 mm to the left, stable  Otherwise, the ventricular system is moderately dilated  VISUALIZED ORBITS AND PARANASAL SINUSES:  No acute abnormality involving the orbits  Mild scattered sinus mucosal thickening is noted  No fluid levels are seen  CALVARIUM AND EXTRACRANIAL SOFT TISSUES:  Calvarium intact  Incidental note is made of ununited posterior arch of C1  Impression: 1  Evolving right MCA territory infarction with petechial hemorrhage in the right basal ganglia, stable  2   Cerebral atrophy with chronic small vessel ischemic change  Workstation performed: [id number]     Mri Brain Wo Contrast    Result Date: 7/18/2019  Narrative: MRI BRAIN WITHOUT CONTRAST INDICATION: right MCA infarct  COMPARISON:   7/17/2019 CT/CTA TECHNIQUE:  Sagittal T1, axial T2, axial FLAIR, axial T1, axial York and axial diffusion imaging  IMAGE QUALITY:  Diagnostic  FINDINGS: BRAIN PARENCHYMA: A large region of acute to subacute infarction involves the principally the perisylvian portion of the right temporal lobe with less pronounced involvement of the adjacent inferior right frontal and anterior right parietal lobes  There is contiguous acute involvement of the head of the right caudate nucleus and corona radiata  Mild edema and mass effect with mild right lateral ventricular compression  Findings are consistent with CT  No additional acute ischemia identified  Mild microangiopathic  ischemic changes involve deep white matter elsewhere  Development of region of acute hemorrhage measuring approximately 1 cm x 1 5 cm acute microhemorrhage within the right lenticular nucleus and to lesser degree adjacent temporal lobe  There is no discrete mass, or midline shift  VENTRICLES:  The ventricles are normal in size and contour  SELLA AND PITUITARY GLAND:  Normal  ORBITS:  Normal  PARANASAL SINUSES:  Normal  VASCULATURE: Poorly visualized right middle cerebral artery CALVARIUM AND SKULL BASE:  Normal  EXTRACRANIAL SOFT TISSUES:  Normal      Impression: Large acute right MCA territory infarction primarily involving the temporal lobe with lesser degree of involvement of the inferolateral frontal lobe and anterior parietal lobe  Consistent with CT/CTA Development of acute microhemorrhage within the right lenticular nucleus and adjacent temporal lobe Findings personally discussed by phone with Dr Bertha Cantu at 3:10 PM, 7/18/2019 Workstation performed: [id number]     Ct Stroke Alert Brain    Result Date: 7/17/2019  Narrative: CT BRAIN - STROKE ALERT PROTOCOL INDICATION:   Acute stroke  COMPARISON:  None  TECHNIQUE:  CT examination of the brain was performed  In addition to axial images, coronal reformatted images were created and submitted for interpretation  Radiation dose length product (DLP) for this visit:  1017 2 mGy-cm   This examination, like all CT scans performed in the [facility], was performed utilizing techniques to minimize radiation dose exposure, including the use of iterative  reconstruction and automated exposure control  IMAGE QUALITY:  Diagnostic   FINDINGS:  PARENCHYMA:  Cortical and subcortical hypoattenuation with sulcal effacement in the right frontoparietal temporal region, also involving the posterior insula, right basal ganglia and external capsule  No evidence of intracranial hemorrhage  No midline shift  Periventricular and subcortical hypoattenuating foci consistent with microangiopathic disease  Increased density in right M1 and proximal M2 branches likely represent thrombus  VENTRICLES AND EXTRA-AXIAL SPACES:  No hydrocephalus or extra-axial collection  VISUALIZED ORBITS AND PARANASAL SINUSES:  Intact globes and orbits  Chronic paranasal sinus disease  CALVARIUM AND EXTRACRANIAL SOFT TISSUES:  No lytic or blastic lesion or soft tissue mass  Impression: 1  Acute to early subacute right MCA territory infarct involving greater than one third of the vascular territory  No evidence of hemorrhagic conversion or significant mass effect  2   Hyperdense right M1 and M2 branches suggesting acute thrombus  Findings were directly discussed with Tino Lewis on 7/17/2019 7:16 PM  Workstation performed: [id number]     Mri Pelvis Bony Wo And W Contrast    Result Date: 7/19/2019  Narrative: MRI BONY PELVIS WITH AND WITHOUT CONTRAST INDICATION:   Rigth hip pain, r/o non discplaced fracture  Stroke COMPARISON: X-ray from prior day TECHNIQUE:  MR sequences were obtained of the pelvis including: Precontrast: Localizer, Coronal STIR or coronal T2 fat sat, coronal T1, axial T1, axial T2 fat sat, sagittal T2 fat sat, Sagittal T1, axial LAVA T1 fat sat  Post contrast: axial LAVA T1 fat sat, coronal LAVA T1 fat sat  IV Contrast:  8 mL of Gadobutrol injection (SINGLE-DOSE) FINDINGS: RIGHT HIP: -JOINT EFFUSION: None  -BONE MARROW SIGNAL AND ALIGNMENT: Normal marrow signal demonstrated without hip fracture or AVN  -TROCHANTERIC BURSA: Contains fluid -MUSCLES AND TENDONS:  There is diffuse asymmetric hypertrophy of the right gluteus jarrod, with T2 bright signal   Relative hypoperfusion is seen near the origin  No enhancing mass or collection LEFT HIP: -JOINT EFFUSION: None   -BONE MARROW SIGNAL AND ALIGNMENT: Normal marrow signal demonstrated without hip fracture or AVN  -TROCHANTERIC BURSA: Normal  -MUSCLES AND TENDONS:  Intramuscular edema is seen within the proximal aspect of the vastus musculature proximally, and within the gluteus minimus BONY PELVIS: -SI JOINTS AND SYMPHYSIS PUBIS:   Intact  -VISUALIZED LUMBAR SPINE:  Unremarkable  -OVERALL MARROW SIGNAL:  Normal, without suspicious focal lesions  -MUSCULATURE:  As above -PELVIC SOFT TISSUES:   Additionally there is subcutaneous edema within the soft tissues surrounding the right hip SUBCUTANEOUS TISSUES: Normal     Impression: 1  Asymmetric hypertrophy and edema within the right gluteus jarrod, consider rhabdomyolysis 2  No fracture Workstation performed: [id number]     Xr Hips Bilateral 3-4 Vw W Pelvis If Performed    Result Date: 7/18/2019  Narrative: BILATERAL HIPS AND PELVIS INDICATION:   fall  COMPARISON:  None VIEWS:  XR HIPS BILATERAL 3-4 VW W PELVIS IF PERFORMED  FINDINGS: Possible nondisplaced right superior pubic ramus fracture  Correlate for point tenderness  Visualized lower lumbar spine is unremarkable  LEFT HIP: No significant hip degenerative changes  Joint space alignment is maintained  Soft tissues are unremarkable  RIGHT HIP: No significant hip degenerative changes  Joint space alignment is maintained  Soft tissues are unremarkable  Impression: Possible nondisplaced right superior pubic ramus fracture  Correlate for point tenderness  Workstation performed: [id number]     Cta Stroke Alert (head/neck)    Result Date: 7/17/2019  Narrative: CTA NECK AND BRAIN WITH AND WITHOUT CONTRAST INDICATION: Acute stroke  COMPARISON:   7/17/2019  TECHNIQUE:  Post contrast imaging was performed after administration of iodinated contrast through the neck and brain  Post contrast axial 0 625 mm images timed to opacify the arterial system  3D rendering was performed on an independent workstation  MIP reconstructions performed   Coronal reconstructions were performed of the noncontrast portion of the brain  Radiation dose length product (DLP) for this visit:  353 18 mGy-cm   This examination, like all CT scans performed in the [facility], was performed utilizing techniques to minimize radiation dose exposure, including the use of iterative  reconstruction and automated exposure control  IV Contrast:  85 mL of iohexol (OMNIPAQUE)  IMAGE QUALITY:   Motion artifact results in suboptimal evaluation FINDINGS: CERVICAL VASCULATURE AORTIC ARCH AND GREAT VESSELS:  Three-vessel configuration aortic arch  Great vessel origins not well evaluated due to motion artifact  No significant stenosis in the subclavian arteries  RIGHT VERTEBRAL ARTERY CERVICAL SEGMENT:  Normal origin  The vessel is normal in caliber throughout the neck  LEFT VERTEBRAL ARTERY CERVICAL SEGMENT:  Normal origin  The vessel is normal in caliber throughout the neck  RIGHT EXTRACRANIAL CAROTID SEGMENT:  Normal caliber common carotid artery  Calcified plaque at the bifurcation and ICA origin  Severe (70-90%) stenosis at the ICA origin  Decreased caliber of the vessel throughout the cervical segment with intimal thickening  LEFT EXTRACRANIAL CAROTID SEGMENT:  Normal caliber common carotid artery  Calcified plaque the bifurcation and ICA origin resulting in mild to moderate (50-60%) stenosis  NASCET criteria was used to determine the degree of internal carotid artery diameter stenosis  INTRACRANIAL VASCULATURE INTERNAL CAROTID ARTERIES:  Diminutive right carotid siphon likely secondary to severe proximal stenosis  Calcified plaque throughout the left carotid siphon without significant stenosis  Patent ophthalmic arteries  ANTERIOR CIRCULATION:  Absent or severely hypoplastic right A1 segment  Anterior to indicating artery visualized  No stenosis in the anterior cerebral arteries   MIDDLE CEREBRAL ARTERY CIRCULATION: Absence of opacification of the proximal right middle cerebral artery  Minimal enhancement of distal M3 branches suggesting poor collateral flow  No stenosis in the proximal right middle cerebral artery  DISTAL VERTEBRAL ARTERIES:  Normal distal vertebral arteries  Posterior inferior cerebellar artery origins are normal  Normal vertebral basilar junction  BASILAR ARTERY:  Basilar artery is normal in caliber  Normal superior cerebellar arteries  POSTERIOR CEREBRAL ARTERIES: Both posterior cerebral arteries arises from the basilar tip  Both arteries demonstrate normal enhancement  Tiny bilateral posterior communicating arteries  DURAL VENOUS SINUSES:  Limited evaluation without evidence of or thrombosis  NON VASCULAR ANATOMY BONY STRUCTURES:  No acute osseous abnormality  SOFT TISSUES OF THE NECK:  Normal  THORACIC INLET:  Unremarkable  Impression: 1  Severe (70-90%) stenosis at the origin of the right internal carotid artery  Remainder of the vessel is diminutive likely secondary to proximal stenosis  2   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  Very minimal opacification of distal M3 branches suggesting poor collateral flow  I personally discussed this study with Scar Maldonado on 2019 at 7:14 PM  Workstation performed: [id number]        EKG / Monitor: Personally reviewed  Atrial fibrillation with heart rate around  beats per minute      Cardiac testing:   Results for orders placed during the hospital encounter of 19   Echo complete with contrast if indicated    Bobbi Cadet [address]  [address] [facility] [address]  [phone]    Transthoracic Echocardiogram  2D, M-mode, Doppler, and Color Doppler    Study date:  2019    Patient: Jaun Doe  MR number: [id number]  Account number: [de-identified]  : 1935  Age: 81 years  Gender: Male  Status: Inpatient  Location: Bedside  Height: 66 in 66 in  Weight: 177 5 lb 177 5 lb  BP: 92/ 54 mmHg    Indications: Cerebral Thrombosis    Diagnoses: I67 9 - Cerebrovascular disease, unspecified    Sonographer:  ABI Clark  Primary Physician:  Dar Allen MD  Referring Physician:  WYATT Lewis  Group:  [facility]  Interpreting Physician:  Kodak Mosley MD    SUMMARY    PROCEDURE INFORMATION:  This was a technically difficult study  Echocardiographic views were limited due to restricted patient mobility, poor patient compliance, poor acoustic window availability, and lung interference  LEFT VENTRICLE:  Ejection fraction was estimated to be around 50 %  Although no diagnostic regional wall motion abnormality was identified, this possibility cannot be completely excluded on the basis of this study  Wall thickness was mildly increased  There was mild concentric hypertrophy  Doppler parameters were consistent with abnormal left ventricular relaxation (grade 1 diastolic dysfunction)  ATRIAL SEPTUM:  No Large ASD or large PFO identified by buuble study  Buuble study over all limited  MITRAL VALVE:  There was mild regurgitation  AORTIC VALVE:  Transaortic velocity was increased due to valvular stenosis  There was mild to moderate stenosis  peak velocity 2 33 m/se, Peak gradient 23, mean 11 mm of Hg and DENZEL around 1 35 to 1 4 cm2  TRICUSPID VALVE:  There was mild regurgitation  Estimated peak PA pressure was 35 mmHg  HISTORY: PRIOR HISTORY: Neuralgia, Colon Cancer    PROCEDURE: The procedure was performed at the bedside  This was a routine study  The transthoracic approach was used  The study included complete 2D imaging, M-mode, complete spectral Doppler, and color Doppler  The heart rate was 64 bpm,  at the start of the study  Intravenous contrast (agitated saline, 10 ml) was administered to evaluate shunting  Intravenous contrast ( 10 ml) was administered   Echocardiographic views were limited due to restricted patient mobility, poor  patient compliance, poor acoustic window availability, and lung interference  This was a technically difficult study  LEFT VENTRICLE: Size was normal  Ejection fraction was estimated to be around 50 %  Although no diagnostic regional wall motion abnormality was identified, this possibility cannot be completely excluded on the basis of this study  Wall  thickness was mildly increased  There was mild concentric hypertrophy  DOPPLER: Doppler parameters were consistent with abnormal left ventricular relaxation (grade 1 diastolic dysfunction)  RIGHT VENTRICLE: The size was normal  Systolic function was normal     LEFT ATRIUM: Size was normal     ATRIAL SEPTUM: No Large ASD or large PFO identified by buuble study  Buuble study over all limited  RIGHT ATRIUM: Size was normal     MITRAL VALVE: There was normal leaflet separation  DOPPLER: The transmitral velocity was within the normal range  There was no evidence for stenosis  There was mild regurgitation  AORTIC VALVE: The valve was probably trileaflet  Leaflets exhibited mildly increased thickness, mild calcification, and normal cuspal separation  DOPPLER: Transaortic velocity was increased due to valvular stenosis  There was mild to  moderate stenosis  peak velocity 2 33 m/se, Peak gradient 23, mean 11 mm of Hg and DENZEL around 1 35 to 1 4 cm2  There was no regurgitation  TRICUSPID VALVE: DOPPLER: There was mild regurgitation  Estimated peak PA pressure was 35 mmHg  PULMONIC VALVE: DOPPLER: There was no significant regurgitation  PERICARDIUM: There was no thickening or calcification  There was no pericardial effusion  AORTA: The root exhibited normal size  SYSTEMIC VEINS: IVC: The inferior vena cava was not well visualized      SYSTEM MEASUREMENT TABLES    2D mode  AoR Diam 2D: 3 6 cm  LA Diam (2D): 4 3 cm  LA/Ao (2D): 1 19  FS (2D Teich): 22 4 %  IVSd (2D): 1 24 cm  LVDEV: 124 cmï¾³  LVESV: 68 3 cmï¾³  LVIDd(2D): 5 1 cm  LVISd (2D): 3 96 cm  LVOT Area 2D: 3 14 cmï¾²  LVPWd (2D): 1 19 cm  SV (Teich): 55 7 cmï¾³    Apical four chamber  LVEF A4C: 45 %    Unspecified Scan Mode  DENZEL Cont Eq (Peak Patrick): 1 23 cmï¾²  DENZEL Cont Eq (VTI): 1 35 cmï¾²  LVOT (VTI): 18 8 cm  LVOT Diam : 2 cm  LVOT Vmax: 928 mm/s  LVOT Vmax; Mean: 902 mm/s  Peak Grad ; Mean: 3 mm[Hg]  SV (LVOT): 62 cmï¾³  VTI;Mean: 2 mm[Hg]  MV Peak A Patrick: 607 mm/s  MV Peak E Patrick  Mean: 408 mm/s  MVA (PHT): 3 67 cmï¾²  PHT: 60 ms  Max P mm[Hg]  V Max: 2780 mm/s  Vmax: 2780 mm/s  RA Area: 19 8 cmï¾²  RA Volume: 58 5 cmï¾³  TAPSE: 2 1 cm    [employer] Accredited Echocardiography Laboratory    Prepared and electronically signed by    Leonora Zhao MD  Signed 2019 11:00:17         Dr Leonora Zhao MD [facility]      "This note has been constructed using a voice recognition system  Therefore there may be syntax, spelling, and/or grammatical errors   Please call if you have any questions  "

## 2019-07-21 NOTE — PROGRESS NOTES
[address] Internal Medicine Progress Note  Patient: Sharon King 81 y o  male   MRN: [id number]  PCP: Scarlet Gant MD  Unit/Bed#: ICU 12 Encounter: [id number]  Date Of Visit: 07/21/19    Problem List:    Principal Problem:    Cerebrovascular accident (CVA) due to thrombosis of right middle cerebral artery ([address] )  Active Problems:    Atrial fibrillation with RVR ([address] )    Respiratory distress    Traumatic rhabdomyolysis ([address] )    Right hip pain    Urinary retention    Insomnia    Depression with anxiety    Impaired swallowing    Symptomatic stenosis of right carotid artery      Assessment & Plan:    Respiratory distress  Assessment & Plan  Patient remains tachypneic  Chest x-ray with small left-sided effusion without any acute findings  Patient is afebrile  Remains with significant secretion requiring frequent suctioning  Reconfirmed with the patient regarding DNR/DNI status  Patient does not prefer BiPAP but willing to consider at present  Continue aspiration precaution  Monitor intake and output  Check ABG      Atrial fibrillation with RVR ([address] )  Assessment & Plan  New onset, unclear if contributary or subsequent to CVA  Noted to be in new onset rapid AFib with associated tachypnea on 07/19  Heart rate control is better overall better with Cardizem drip  Blood pressure remains acceptable  Status post digoxin loading on 07/19  Cardiology evaluation appreciated  Monitor electrolytes  Monitor intake and output  Follow-up CT with stable findings  Started on IV heparin    * Cerebrovascular accident (CVA) due to thrombosis of right middle cerebral artery [facility])  Assessment & Plan  Patient brought in by EMS after being found on floor with altered mental status by his step-daughter  Patient was last seen normal on Monday, patient was then found down on the bathroom floor with left-sided weakness and altered mentation 7/17,   Patient was a stroke alert  NIH scale 13 on admission   Patient displayed right sided gaze preference, left sided weakness, left sided facial droop  Speech incoherent  CT head, CTA head neck revealed "Severe (70-90%) stenosis at the origin of the right internal carotid artery   Remainder of the vessel is diminutive likely secondary to proximal stenosis  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   Very minimal opacification of distal M3 branches suggesting poor collateral flow"  Endovascular Neurology was contacted by ED, who declined intervention given age and unknown onset   Dr Luca Aviles with Neurology was contacted, patient not a tPA candidate  Recommended 300mg rectal ASA and stroke pathway with close neurologic monitoring overnight given large infarct with possibility of swelling as timing of infarct is unknown  MRI of the brain - Large acute right MCA territory infarction primarily involving the temporal lobe with lesser degree of involvement of the inferolateral frontal lobe and anterior parietal lobe   Development of acute microhemorrhage within the right lenticular nucleus and adjacent temporal lobe  2D echo-EF 94%, grade 1 diastolic dysfunction  No shunt noted  CVA most likely secondary to symptomatic high-grade right carotid stenosis, but cannot rule out contribution from AFib  Neuro remained stable, hold sedating medication  , hemoglobin A1c 6  Allow permissive hypertension with SBP goal 130-140  Repeat CT head - Evolving right MCA territory infarction with petechial hemorrhage in the right basal ganglia, stable  NPO until passes swallow therapy  Continue rectal aspirin  Lipitor 40 mg daily for now but increase to 80 mg once rhabdomyolysis is improved, follow-up CPK  Discussed with Cardiology and Neurology, patient was started on IV heparin  PT/OT/ST  Vascular surgery consultation noted, recommended follow-up as outpatient for carotid endarterectomy    Follow-up baseline carotid ultrasound  Patient will need anticoagulation in light of AFib based on CT scan findings  Neurology following    Urinary retention  Assessment & Plan  Bladder scan noted to have more than 800 cc of urinary retention  Setting of CVA, a KI, rhabdomyolysis, pelvic fracture  Cohen catheter was placed  Voiding trial when ambulating    Right hip pain  Assessment & Plan  Patient reported pain on the right groin  Related to patient's fall  X-ray revealed possibility of nondisplaced right superior pubic ramus fracture  Orthopedic input appreciated, recommended MRI of the right pelvis/hip  MRI-no evidence of fracture but revealed Asymmetric hypertrophy and edema within the right gluteus jarrod  Follow up orthopedic recommendation  PT OT as tolerated    Traumatic rhabdomyolysis [facility])  Assessment & Plan  Patient was found lying in the floor for prolonged preop  CPK noted to be 5381 on admission, increase to 6896 now and downtrending  MRI revealed asymmetric hypertrophy and edema within the right gluteus jarrod, likely cause of rhabdomyolysis  Continue IV fluids, monitor renal function, intake and output  PT/OT as tolerated    Acute kidney injury (HCC)resolved as of 7/20/2019  Assessment & Plan  Creatinine 1 5 on presentation  Likely secondary to prerenal, rhabdomyolysis, urinary retention  Improving with IV fluids  Continue to monitor intake and output    Leukocytosisresolved as of 7/20/2019  Assessment & Plan  Presented with leukocytosis of 16 K  Likely reactive  Chest x-ray without any acute infiltrate, UA without evidence of UTI  Blood cultures pending  Improving    Elevated lactic acid levelresolved as of 7/20/2019  Assessment & Plan  Lactic acid 3 3 on presentation, subsequently improved with IV fluids    Depression with anxiety  Assessment & Plan  Hold home medication mirtazapine and seroquel    Insomnia  Assessment & Plan  Hold home medication mirtazapine and seroquel    Addendum 12:30 p m  Met with the family, daughter-in-law and son to discuss treatment plan  Dr Bhavik Villar was also present  Patient remains with difficulty in clearing his secretion, tachypnea  Patient is clear about his advanced directives and remains DNR/DNI  He also reports that he would not like to use BiPAP  As per the prior wishes, family also mentions that he would not like to have feeding tube or tracheostomy  They would like to discuss this with patient further and in favor of proceeding for comfort measures only  VTE Pharmacologic Prophylaxis:   Pharmacologic: Heparin  Mechanical VTE Prophylaxis in Place: Yes    Patient Centered Rounds: I have performed bedside rounds with nursing staff today  Discussions with Specialists or Other Care Team Provider:  Yes    Education and Discussions with Family / Patient:  yes    Time Spent for Care: 45 minutes  More than 50% of total time spent on counseling and coordination of care as described above  Current Length of Stay: 4 day(s)    Current Patient Status: Inpatient   Certification Statement: The patient will continue to require additional inpatient hospital stay due to Acute CVA, AFib    Discharge Plan:  Anticipate rehab    Code Status: Level 3 - DNAR and DNI      Subjective:   Remains tachypneic  Secretion requiring frequent suctioning  Easily aroused  Reports feeling short of breath   Did not want to use BiPAP last night  Denies any pain        Objective:   Comfortable  Vitals:   Temp (24hrs), Av 9 °F (36 6 °C), Min:97 °F (36 1 °C), Max:98 5 °F (36 9 °C)    Temp:  [97 °F (36 1 °C)-98 5 °F (36 9 °C)] 97 °F (36 1 °C)  HR:  [101-134] 111  Resp:  [30-45] 40  BP: (121-168)/(66-94) 146/90  SpO2:  [92 %-98 %] 98 %  Body mass index is 23 47 kg/m²  Input and Output Summary (last 24 hours): Intake/Output Summary (Last 24 hours) at 2019 5994  Last data filed at 2019 0801  Gross per 24 hour   Intake 2322 76 ml   Output 3275 ml   Net -952 24 ml       Physical Exam:     Physical Exam   Constitutional: He is cooperative  He is easily aroused  He appears ill   No distress  HENT:   Head: Normocephalic and atraumatic  Eyes: Pupils are equal, round, and reactive to light  Conjunctivae are normal    Neck: Normal range of motion  Neck supple  Cardiovascular: An irregularly irregular rhythm present  Tachycardia present  Murmur heard  Pulmonary/Chest: Accessory muscle usage present  Tachypnea noted  He has decreased breath sounds  He has no wheezes  He has no rhonchi  He has no rales  He exhibits no tenderness  Coarse breath sounds   Abdominal: Soft  Bowel sounds are normal  He exhibits no distension  There is no tenderness  There is no rebound and no guarding  Genitourinary:   Genitourinary Comments: Cohen catheter in place   Musculoskeletal: He exhibits no edema  Neurological: He is easily aroused  He is not disoriented  No cranial nerve deficit or sensory deficit  GCS eye subscore is 4  GCS verbal subscore is 5  GCS motor subscore is 6  Stable  Left-sided hemiplegia, left left-sided sensory loss  Mild dysarthria, dysphagia    Skin: Skin is warm and dry  No rash noted  Additional Data:     Labs:    Results from last 7 days   Lab Units 07/21/19  0624   WBC Thousand/uL 9 80   HEMOGLOBIN g/dL 11 2*   HEMATOCRIT % 34 5*   PLATELETS Thousands/uL 246   NEUTROS PCT % 72   LYMPHS PCT % 12*   MONOS PCT % 13*   EOS PCT % 0     Results from last 7 days   Lab Units 07/21/19  0624 07/20/19  0430   POTASSIUM mmol/L 3 8 3 4*   CHLORIDE mmol/L 107 110*   CO2 mmol/L 19* 23   BUN mg/dL 19 17   CREATININE mg/dL 1 02 1 07   CALCIUM mg/dL 8 9 7 9*   ALK PHOS U/L  --  76   ALT U/L  --  35   AST U/L  --  74*     Results from last 7 days   Lab Units 07/17/19  1905   INR  1 05       * I Have Reviewed All Lab Data Listed Above  * Additional Pertinent Lab Tests Reviewed:  All Labs Within Last 24 Hours Reviewed      Imaging:  Imaging Reports Reviewed Today Include:  Chest x-ray, CT      Recent Cultures (last 7 days):     Results from last 7 days   Lab Units 07/17/19  2100   BLOOD CULTURE  No Growth at 48 hrs  No Growth at 48 hrs  Last 24 Hours Medication List:     Current Facility-Administered Medications:  acetaminophen 325 mg Rectal Q4H PRN Jt De Los Santos MD    aspirin 300 mg Rectal Daily Sherren Chill, PA-C    atorvastatin 40 mg Oral QPM WYATT Hunter    digoxin 125 mcg Intravenous Daily Carlos Reza MD    diltiazem 1-15 mg/hr Intravenous Titrated Carlos Reza MD Last Rate: 7 5 mg/hr (07/21/19 0701)   heparin (porcine) 300-2,000 Units/hr Intravenous Titrated Jt De Los Santos MD Last Rate: 500 Units/hr (07/21/19 0714)   HYDROmorphone 0 2 mg Intravenous Q4H PRN Jt De Los Santos MD    ipratropium 0 5 mg Nebulization Q6H Jt De Los Santos MD    Labetalol HCl 10 mg Intravenous Q6H PRN WYATT Hunter    levalbuterol 1 25 mg Nebulization Q6H Jt De Los Santos MD    And        sodium chloride 3 mL Nebulization Q6H Jt De Los Santos MD    pneumococcal 13-valent conjugate vaccine 0 5 mL Intramuscular Prior to discharge WYATT Hunter    sodium chloride 75 mL/hr Intravenous Continuous Jt De Los Santos MD Last Rate: 100 mL/hr (07/21/19 0701)          Today, Patient Was Seen By: Jt De Los Santos MD    ** Please Note: "This note has been constructed using a voice recognition system  Therefore there may be syntax, spelling, and/or grammatical errors   Please call if you have any questions  "**

## 2019-07-21 NOTE — PHYSICAL THERAPY NOTE
PT TREATMENT     07/21/19 0900   Pain Assessment   Pain Assessment No/denies pain   Restrictions/Precautions   Other Precautions Cognitive; Chair Alarm; Bed Alarm; Fall Risk;O2;Pain;Telemetry;Multiple lines  (L neglect, L hemiplegia)   General   Chart Reviewed Yes   Additional Pertinent History RN OK'd ROM exercises in bed only as pt on cardizem drip, tachypneic, in afib   Cognition   Overall Cognitive Status Impaired   Arousal/Participation   (awake)   Attention Difficulty dividing attention   Subjective   Subjective Pt reports he feels "OK"   Activity Tolerance   Activity Tolerance Patient limited by fatigue;Patient tolerated treatment well   Exercises   Neuro re-ed AAROM R, PROM L LEs all planes/joints x 10 each  Assessment   Prognosis Good   Problem List Decreased strength;Decreased endurance;Decreased range of motion; Impaired balance;Decreased mobility; Decreased cognition;Decreased safety awareness; Impaired judgement;Decreased coordination;Pain   Assessment Pt continues with dense L hemiplegia, only able to move L toes with significant L sided neglect  Vitals stable during activity  Plan   Treatment/Interventions ADL retraining;Functional transfer training;LE strengthening/ROM; Elevations; Therapeutic exercise; Endurance training;Gait training;Bed mobility; Equipment eval/education;Patient/family training;Cognitive reorientation   Progress Progressing toward goals   PT Frequency 5x/wk   Recommendation   Recommendation Short-term skilled PT   Licensure   [place] License Number  [facility] [id number]

## 2019-07-22 NOTE — PROGRESS NOTES
Neurology Consult Follow Up      Lovely Galicia is a 81 y o  male  ICU 12/ICU 12    [id number]        Assessment/Recommendations:    1  Acute to subacute ischemic large right MCA infarction   2  New onset A fib  3  Right carotid severe stenosis, symptomatic   4  HLD     Clinically patient has had minimal improvement in his left hemiplegia  Etiology likely thromboembolic from right carotid stenosis  It's unclear if A fib is paroxysmal or new onset due to current stress  Since he needed to be on heparin from cardiology standpoint, we had agreed to use of iv heparin as long as it was no bolus protocol  When stable, consider eliquis for long term anticoagulation  Cont Aspirin rectal now  Once can swallow, place him on aspirin 325mg daily  Vascular surgery was consulted  They will consider CEA as outpatient  They feel patient is not stable and would rather wait 6-8 weeks and determine need for surgery then  Family seems agreeable to surgery if needed however they need some time to convince patient  When CK is normal, recommend lipitor 80mg  Until then 40mg is ok  LDL is 140  TTE: limited study  Recommend systolic bp in range of 150-185  Allow permissive hypertension  Speech and swallow therapy  PT/OT- acute rehab  Discussed plan with family  Chief Complaint:  Stroke   Subjective:     Patient has remained stable  Over this past weekend cardiology had recommended heparin drip for Afib  Discussed with primary team Sat to use no bolus protocol in setting of recent large stroke  His PTT is still not therapeutic  Past Medical History:   Diagnosis Date    Malignant neoplasm of descending colon ([facility] [address] ) 05/12/2006    Neuralgia     Last Assessed:6/25/13     Social History     Socioeconomic History    Marital status:       Spouse name: Not on file    Number of children: Not on file    Years of education: Not on file    Highest education level: Not on file   Occupational History    Not on file Social Needs    Financial resource strain: Not on file    Food insecurity:     Worry: Not on file     Inability: Not on file    Transportation needs:     Medical: Not on file     Non-medical: Not on file   Tobacco Use    Smoking status: Former Smoker    Smokeless tobacco: Never Used   Substance and Sexual Activity    Alcohol use: Not Currently     Frequency: Never     Binge frequency: Never    Drug use: No    Sexual activity: Not on file   Lifestyle    Physical activity:     Days per week: Not on file     Minutes per session: Not on file    Stress: Not on file   Relationships    Social connections:     Talks on phone: Not on file     Gets together: Not on file     Attends Evangelical service: Not on file     Active member of club or organization: Not on file     Attends meetings of clubs or organizations: Not on file     Relationship status: Not on file    Intimate partner violence:     Fear of current or ex partner: Not on file     Emotionally abused: Not on file     Physically abused: Not on file     Forced sexual activity: Not on file   Other Topics Concern    Not on file   Social History Narrative    Not on file     No family history on file  ROS:  Please see HPI for positive symptoms  No fever, no chills, no weight change  Ocular: No drainage, no blurred vision  HEENT:  No sore throat, earache, or congestion  No neck pain  COR:  No chest pain  No palpitations  Lungs:  no sob, wheezing,  GI:  no  nausea, no vomiting, no diarrhea, no constipation, no anorexia  :  No dysuria, frequency, or urgency  No hematuria  No vaginal discharge or vaginal bleeding  Musculoskeletal:  No joint pain or swelling or edema  Skin:  No rash or itching  Psychiatric:  no anxiety, no depression  Endocrine:  No polyuria or polydipsia        Objective:  /82 (BP Location: Right arm)   Pulse 103   Temp 97 9 °F (36 6 °C) (Temporal)   Resp (!) 35   Ht 5' 6" (1 676 m)   Wt 80 3 kg (177 lb 0 5 oz)   SpO2 97% BMI 25 40 kg/m²     General: alert   Mental status: oriented x3  Attention: normal  Knowledge: fair  Language and Speech: speech dysarthric  Cranial nerves: II-XII intact right gaze preference, dysphagia   Muscle tone: flaccid on left side  Motor strength:  LUE: 0/5, LLE: 1/5,  RUE: 4/5, RLE: 4/5  Sensory:  moderate to severe sensory loss on left side   Gait: bob  Left complete hemiplegia   Coordination: bob on left side  Reflexes: 2+ throughout  except as noted       Labs:      Lab Results   Component Value Date    WBC 8 31 07/22/2019    HGB 10 8 (L) 07/22/2019    HCT 33 7 (L) 07/22/2019    MCV 96 07/22/2019     07/22/2019     Lab Results   Component Value Date    HGBA1C 6 0 07/18/2019     Lab Results   Component Value Date    ALT 35 07/20/2019    AST 74 (H) 07/20/2019    ALKPHOS 76 07/20/2019     Lab Results   Component Value Date    CALCIUM 8 7 07/22/2019    K 3 7 07/22/2019    CO2 22 07/22/2019     (H) 07/22/2019    BUN 20 07/22/2019    CREATININE 0 96 07/22/2019         Review of reports and notes reveal:       Xr Chest Portable    Result Date: 7/22/2019  Diminished left basilar opacity likely representing atelectasis or small effusion  Workstation performed: [id number]     Xr Chest Portable    Result Date: 7/21/2019  Small left-sided pleural effusion  Workstation performed: [id number]     Xr Chest Portable    Result Date: 7/19/2019  No acute cardiopulmonary disease  See comments  Workstation performed: [id number]     Xr Chest 1 View Portable    Result Date: 7/18/2019  No acute cardiopulmonary disease  Workstation performed: [id number]     Ct Head Wo Contrast    Result Date: 7/20/2019  1  Evolving right MCA territory infarction with petechial hemorrhage in the right basal ganglia, stable  2   Cerebral atrophy with chronic small vessel ischemic change   Workstation performed: [id number]     Mri Brain Wo Contrast    Result Date: 7/18/2019  Large acute right MCA territory infarction primarily involving the temporal lobe with lesser degree of involvement of the inferolateral frontal lobe and anterior parietal lobe  Consistent with CT/CTA Development of acute microhemorrhage within the right lenticular nucleus and adjacent temporal lobe Findings personally discussed by phone with Dr Jose Tatum at 3:10 PM, 7/18/2019 Workstation performed: [id number]     Ct Stroke Alert Brain    Result Date: 7/17/2019  1  Acute to early subacute right MCA territory infarct involving greater than one third of the vascular territory  No evidence of hemorrhagic conversion or significant mass effect  2   Hyperdense right M1 and M2 branches suggesting acute thrombus  Findings were directly discussed with Ruth Wren on 7/17/2019 7:16 PM  Workstation performed: [id number]     Mri Pelvis Bony Wo And W Contrast    Result Date: 7/19/2019  1  Asymmetric hypertrophy and edema within the right gluteus jarrod, consider rhabdomyolysis 2  No fracture Workstation performed: [id number]     Xr Hips Bilateral 3-4 Vw W Pelvis If Performed    Result Date: 7/18/2019  Possible nondisplaced right superior pubic ramus fracture  Correlate for point tenderness  Workstation performed: [id number]     Cta Stroke Alert (head/neck)    Result Date: 7/17/2019  1  Severe (70-90%) stenosis at the origin of the right internal carotid artery  Remainder of the vessel is diminutive likely secondary to proximal stenosis  2   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  Very minimal opacification of distal M3 branches suggesting poor collateral flow  I personally discussed this study with Ruth Wren on 7/17/2019 at 7:14 PM  Workstation performed: [id number]             Thank you for this consult      Total time of encounter:  30 min  More than 50% of the time was used in counseling and/or coordination of care  Extent of couseling and/or coordination of care        MD [facility]  [address] [place] [address] [address] [place] [address]  [phone]

## 2019-07-22 NOTE — PHYSICAL THERAPY NOTE
PT TREATMENT     07/22/19 1351   Pain Assessment   Pain Assessment No/denies pain   Restrictions/Precautions   Other Precautions Multiple lines; Fall Risk;Bed Alarm; Chair Alarm;Cognitive  (left hemiparesis and neglect)   General   Chart Reviewed Yes   Family/Caregiver Present Yes  (multiple family members)   Cognition   Arousal/Participation Cooperative;Responsive   Attention Attends with cues to redirect   Following Commands Follows one step commands with increased time or repetition   Subjective   Subjective "ok"   Activity Tolerance   Activity Tolerance Patient tolerated treatment well   Exercises   Heelslides PROM; Left;AAROM; Right;10 reps; Supine   Hip Abduction PROM; Left;AAROM; Right;10 reps; Supine   Hip Extension PROM; Left;AAROM; Right;10 reps; Supine  (hip IR/ER)   Ankle Pumps PROM; Left;AAROM; Right;10 reps; Supine   Assessment   Assessment Trace hip adduction and hip IR noted LLE  Pt more alert with increased attention to PT treatment today  Plan   Treatment/Interventions ADL retraining;Functional transfer training;LE strengthening/ROM; Therapeutic exercise; Endurance training;Cognitive reorientation;Patient/family training;Equipment eval/education; Bed mobility;Gait training; Compensatory technique education   PT Frequency Once a day   Recommendation   Recommendation Short-term skilled PT   Licensure   [place] License Number  [id number] [place] [address] [id number]

## 2019-07-22 NOTE — PLAN OF CARE
Problem: OCCUPATIONAL THERAPY ADULT  Goal: Performs self-care activities at highest level of function for planned discharge setting  See evaluation for individualized goals  Outcome: Progressing  Note:   Limitation: Decreased ADL status, Decreased UE strength, Decreased Safe judgement during ADL, Decreased endurance, Decreased self-care trans, Decreased high-level ADLs, Decreased cognition, Decreased UE ROM, Decreased fine motor control, Non-func L UE, Visual deficit(decreased balance and mobility )  Prognosis: Fair  Assessment: Pt demonstrating increased active participation in activities  Pt demonstrating trace muscle activation in LUE  Pt will continue to benefit from skilled occupational therapy services to increase independence and safety with ADLs and functional mobility       OT Discharge Recommendation: Short Term Rehab

## 2019-07-22 NOTE — PROGRESS NOTES
Progress Note - Cardiology     Trevor Cunningham Sr 81 y o  male MRN: [id number]  : 1935  Unit/Bed#: ICU 12 Encounter: [id number]    Assessment and Plan:     1  Atrial fibrillation with rapid ventricular rate  · New onset suspected  Possible history of paroxysmal atrial fibrillation  (Infrequent medical care )  · Rate controlled  · Cardizem gtt D/C-ed (-) 2/2 hypotension (113/81) and permissive htn recommendations  · PRN Labetalol D/C-ed, low dose Metoprolol 2 5 mg q6h IV w/ holding parametes sBP <130 and HR <60  · Continue w/  Digoxin 125 mcg IV qd for rate control  · Permissive hypertension in light of CVA  Goal HR:  bpm      2  CVA  · 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  · Right carotid stenosis, complete vascular study ordered  · Continue w/ Heparin gtt and aspirin 300mg qd rectally  · Seen by vascular surgery, outpatient carotid enterectomy recommended  · Follow-up by Neurology significant improvement  · Deferred feeding tube and tracheostomy      3  Dyslipidemia  · Currently on Lipitor 40 mg, consider increasing to Lipitor 80 mg per Neuro recommendation      4  Right hip pain  · Seen by Ortho  No obvious pelvis fracture  Pain management  · MRI shows asymmetric hypertrophy and edema within the right gluteus jarrod, consider rhabdomyolysis      5  Traumatic rhabdomyolysis  · Patient was lying on the floor for prolonged period of time  · CPK was elevated  CPK: 3,324->1,153  Monitor electrolytes  Continue gentle IV hydration  EF around 50%        6  Respiratory distress   · Persistent tachpnea, s/p chest therapy and Lasix 20mg IV x1  · Continue w/ O2 via NC  · Patient was Bipap yesterday for a few hours in the afternoon, aspiration precaution  ABG ordered by primary team   · Currently NPO per speech therapy  · Repeat C-Xray ordered  · Xopenex PRN    7  History of acute kidney injury  · Serum creatinine now improving   Cr 1 21->0 96    Subjective / Objective:     Patient seen and examined at bedside  Yesterday, patient accepted to be on Bipap after initial refusal  No acute overnight even reported  Patient denies CP and palpitation but admits to SOB and mild intermittent abdominal pain that he attributes to his hernia  R N  Mentions that patient is still having secretion, but he is able to clear is throat and cough s/p chest therapy  Cardizem drip was D/C-ed yesterday in light of hypotension  Vitals: Blood pressure 163/87, pulse 89, temperature 97 9 °F (36 6 °C), temperature source Temporal, resp  rate (!) 27, height 5' 6" (1 676 m), weight 80 3 kg (177 lb 0 5 oz), SpO2 98 %  Vitals:    07/21/19 0600 07/22/19 0400   Weight: 74 2 kg (163 lb 9 3 oz) 80 3 kg (177 lb 0 5 oz)     Body mass index is 25 4 kg/m²  BP Readings from Last 3 Encounters:   07/22/19 163/87   12/18/18 120/70   02/13/18 130/80     Orthostatic Blood Pressures      Most Recent Value   Blood Pressure  163/87 filed at 07/22/2019 0600   Patient Position - Orthostatic VS  Lying filed at 07/21/2019 1600        I/O       07/20 0701 - 07/21 0700 07/21 0701 - 07/22 0700 07/22 0701 - 07/23 0700    P  O  0      I V  (mL/kg) 2322 8 (31 3) 1455 4 (18 1)     Total Intake(mL/kg) 2322 8 (31 3) 1455 4 (18 1)     Urine (mL/kg/hr) 3175 (1 8) 875 (0 5) 75 (0 4)    Total Output 3175 875 75    Net -852 2 +580 4 -75               Invasive Devices     Peripheral Intravenous Line            Peripheral IV 07/20/19 Left Forearm 1 day    Peripheral IV 07/21/19 Right;Dorsal (posterior) Forearm 1 day          Drain            Urethral Catheter Non-latex 16 Fr  3 days                  Intake/Output Summary (Last 24 hours) at 7/22/2019 0914  Last data filed at 7/22/2019 0701  Gross per 24 hour   Intake 1455 42 ml   Output 850 ml   Net 605 42 ml         Physical Exam:     Neurologic:  Alert & oriented x 3, right sided gaze, left sided weakness, unable to swallow secretion  Constitutional:  Well developed, well nourished, NAD  HENT:  AT/NC  Respiratory:  Decreased coarse breath sounds  Cardiovascular: S1-S2 regular, irregularly irregular   GI:  Soft, nondistended, normal bowel sounds, nontender, surgical hernia noted  Musculoskeletal:  Atraumatic, no edema, normal dorsalis pedis pulse b/l  Psychiatric:  Speech and behavior appropriate       Medications/ Allergies:     Current Facility-Administered Medications:  acetaminophen 325 mg Rectal Q4H PRN Prasanth Nelson MD    aspirin 300 mg Rectal Daily USMAN Ramírez    atorvastatin 40 mg Oral QPM WYATT Hunter    dextrose 5 % and sodium chloride 0 9 % with KCl 20 mEq/L 75 mL/hr Intravenous Continuous Prasanth Nelson MD Last Rate: 75 mL/hr (07/22/19 0701)   digoxin 125 mcg Intravenous Daily Dilan Shay MD    diltiazem 1-15 mg/hr Intravenous Titrated Dilan Shay MD Last Rate: Stopped (07/21/19 1951)   furosemide 20 mg Intravenous Once Prasanth Nelson MD    heparin (porcine) 300-2,000 Units/hr Intravenous Titrated Prasanth Nelson MD Last Rate: 800 Units/hr (07/22/19 0701)   HYDROmorphone 0 2 mg Intravenous Q4H PRN Prasanth Nelson MD    ipratropium 0 5 mg Nebulization Q6H Prasanth Nelson MD    Labetalol HCl 10 mg Intravenous Q6H PRN WYATT Hunter    levalbuterol 1 25 mg Nebulization Q6H Prasanth Nelson MD    And        sodium chloride 3 mL Nebulization Q6H Prasanth Nelson MD    pneumococcal 13-valent conjugate vaccine 0 5 mL Intramuscular Prior to discharge WYATT Cortes        acetaminophen 325 mg Q4H PRN   HYDROmorphone 0 2 mg Q4H PRN   Labetalol HCl 10 mg Q6H PRN   pneumococcal 13-valent conjugate vaccine 0 5 mL Prior to discharge     Allergies   Allergen Reactions    Hydrocodone        VTE Pharmacologic Prophylaxis:   Heparin    Labs:   Troponins:  Results from last 7 days   Lab Units 07/22/19  0631 07/21/19  0624 07/20/19  0430   CK TOTAL U/L 1,153* 2,071* 2,506*   CK MB INDEX % <1 0 <1 0 <1 0 CBC with diff:  Results from last 7 days   Lab Units 07/22/19  0631 07/21/19  0624 07/20/19  0430 07/19/19  0518 07/18/19  0506 07/17/19  1905   WBC Thousand/uL 8 31 9 80 8 20 10 38* 14 71* 16 18*   HEMOGLOBIN g/dL 10 8* 11 2* 10 3* 10 2* 11 3* 12 3   HEMATOCRIT % 33 7* 34 5* 31 8* 31 6* 35 3* 38 3   MCV fL 96 95 95 95 95 95   PLATELETS Thousands/uL 232 246 172 164 196 213   MCH pg 30 6 30 7 30 7 30 7 30 4 30 6   MCHC g/dL 32 0 32 5 32 4 32 3 32 0 32 1   RDW % 16 3* 15 8* 16 2* 16 7* 16 6* 16 1*   MPV fL 9 6 9 6 9 6 10 1 9 3 9 5   NRBC AUTO /100 WBCs 0 0 0 0 0 0       CMP:  Results from last 7 days   Lab Units 07/22/19  0631 07/21/19  0624 07/20/19  0430 07/19/19  0518 07/18/19  0506 07/17/19  1905   SODIUM mmol/L 142 139 144 144 143 138   POTASSIUM mmol/L 3 7 3 8 3 4* 3 7 3 8 4 7   CHLORIDE mmol/L 110* 107 110* 111* 108 104   CO2 mmol/L 22 19* 23 18* 25 24   ANION GAP mmol/L 10 13 11 15* 10 10   BUN mg/dL 20 19 17 19 19 17   CREATININE mg/dL 0 96 1 02 1 07 1 21 1 42* 1 55*   CALCIUM mg/dL 8 7 8 9 7 9* 7 7* 7 9* 8 0*   AST U/L  --   --  74* 72*  --  78*   ALT U/L  --   --  35 24  --  20   ALK PHOS U/L  --   --  76 68  --  92   TOTAL PROTEIN g/dL  --   --  6 1* 6 4  --  6 7   ALBUMIN g/dL  --   --  2 7* 3 2*  --  3 0*   TOTAL BILIRUBIN mg/dL  --   --  0 90 0 80  --  0 40   EGFR ml/min/1 73sq m 73 68 64 55 46 41       Magnesium:  Results from last 7 days   Lab Units 07/22/19  0631 07/21/19  0624 07/20/19  0430 07/19/19  0518 07/18/19  0506 07/17/19  1905   MAGNESIUM mg/dL 2 4 2 3 2 0 2 0 2 0 2 0     Coags:  Results from last 7 days   Lab Units 07/22/19  0631 07/21/19  2346 07/21/19  1757 07/21/19  1102 07/21/19  0624 07/20/19  2218 07/20/19  1530 07/17/19  1905   PTT seconds 36 37* 31 27 29 27 31 25   INR   --   --   --   --   --   --   --  1 05     TSH:  Results from last 7 days   Lab Units 07/17/19  1905   TSH 3RD GENERATON uIU/mL 2 007     Lipid Profile:  Results from last 7 days   Lab Units 07/18/19  0506 CHOLESTEROL mg/dL 204*   TRIGLYCERIDES mg/dL 83   HDL mg/dL 47   LDL CALC mg/dL 140*     Hgb A1c:  Results from last 7 days   Lab Units 07/18/19  0506   HEMOGLOBIN A1C % 6 0     NT-proBNP: No results for input(s): NTBNP in the last 72 hours  Imaging & Testing   I have personally reviewed pertinent reports  Xr Chest Portable    Result Date: 7/21/2019  Narrative: CHEST INDICATION:   Tachypnea  COMPARISON:  07/19/2019 EXAM PERFORMED/VIEWS:  XR CHEST PORTABLE 1 image FINDINGS: Cardiomediastinal silhouette appears enlarged  Pulmonary vessels are normal   The lungs are clear  No pneumothorax  Small left-sided pleural effusion  Osseous structures appear within normal limits for patient age  Impression: Small left-sided pleural effusion  Workstation performed: [id number]     Xr Chest Portable    Result Date: 7/19/2019  Narrative: CHEST INDICATION:   Shortness of breath  COMPARISON:  7/17/2019 EXAM PERFORMED/VIEWS:  XR CHEST PORTABLE Images: 2 (duplicate image re-windowed with line enhancement technique) FINDINGS: Cardiomediastinal silhouette appears stable  Calcified uncoiled thoracic aorta  Mild prominence of the ascending aortic contour which could represent tortuosity  CT imaging would be required to evaluate aortic size if clinically relevant  The lungs are grossly clear  Slight elevation of the right diaphragm  No pneumothorax or pleural effusion  Osseous structures appear within normal limits for patient age  Surgical clips right upper quadrant  Impression: No acute cardiopulmonary disease  See comments  Workstation performed: [id number]     Xr Chest 1 View Portable    Result Date: 7/18/2019  Narrative: CHEST INDICATION:   htn  Stroke COMPARISON:  None EXAM PERFORMED/VIEWS:  XR CHEST PORTABLE 1 image FINDINGS: Cardiomediastinal silhouette appears unremarkable  The lungs are clear  No pneumothorax or pleural effusion  Osseous structures appear within normal limits for patient age  Impression: No acute cardiopulmonary disease  Workstation performed: [id number]     Ct Head Wo Contrast    Result Date: 7/20/2019  Narrative: CT BRAIN - WITHOUT CONTRAST INDICATION:   Intracranial hemorrhage; Stroke; Follow-up prior to starting anticoagulation  COMPARISON:  MR brain July 18, 2019, CTA stroke alert July 17, 2019 and CT brain July 17, 2019  TECHNIQUE:  CT examination of the brain was performed  In addition to axial images, coronal 2D reformatted images were created and submitted for interpretation  Radiation dose length product (DLP) for this visit:  997 84 mGy-cm   This examination, like all CT scans performed in the [facility], was performed utilizing techniques to minimize radiation dose exposure, including the use of iterative  reconstruction and automated exposure control  IMAGE QUALITY:  Diagnostic  FINDINGS: PARENCHYMA:  Again identified is a hyperdense right middle cerebral artery  There is an associated right MCA territory infarction  Increased density in the right basal ganglia, most compatible with petechial hemorrhage  There is mild mass effect with sulcal effacement  Midline shift of approximately 3 mm to the left  This has not significantly changed  Compression of the right lateral ventricle  Decreased attenuation in the periventricular regions and centrum semiovale bilaterally, consistent with chronic small vessel ischemic change  Prominence of the extra-axial space adjacent to the anterior left temporal lobe, most compatible with arachnoid cyst  VENTRICLES AND EXTRA-AXIAL SPACES:  Stable mild mass effect from right MCA territory infarction with sulcal effacement and compression of the right lateral ventricle  Midline shift of approximately 3 mm to the left, stable  Otherwise, the ventricular system is moderately dilated  VISUALIZED ORBITS AND PARANASAL SINUSES:  No acute abnormality involving the orbits    Mild scattered sinus mucosal thickening is noted   No fluid levels are seen  CALVARIUM AND EXTRACRANIAL SOFT TISSUES:  Calvarium intact  Incidental note is made of ununited posterior arch of C1  Impression: 1  Evolving right MCA territory infarction with petechial hemorrhage in the right basal ganglia, stable  2   Cerebral atrophy with chronic small vessel ischemic change  Workstation performed: [id number]     Mri Brain Wo Contrast    Result Date: 7/18/2019  Narrative: MRI BRAIN WITHOUT CONTRAST INDICATION: right MCA infarct  COMPARISON:   7/17/2019 CT/CTA TECHNIQUE:  Sagittal T1, axial T2, axial FLAIR, axial T1, axial [place] and axial diffusion imaging  IMAGE QUALITY:  Diagnostic  FINDINGS: BRAIN PARENCHYMA: A large region of acute to subacute infarction involves the principally the perisylvian portion of the right temporal lobe with less pronounced involvement of the adjacent inferior right frontal and anterior right parietal lobes  There is contiguous acute involvement of the head of the right caudate nucleus and corona radiata  Mild edema and mass effect with mild right lateral ventricular compression  Findings are consistent with CT  No additional acute ischemia identified  Mild microangiopathic  ischemic changes involve deep white matter elsewhere  Development of region of acute hemorrhage measuring approximately 1 cm x 1 5 cm acute microhemorrhage within the right lenticular nucleus and to lesser degree adjacent temporal lobe  There is no discrete mass, or midline shift  VENTRICLES:  The ventricles are normal in size and contour  SELLA AND PITUITARY GLAND:  Normal  ORBITS:  Normal  PARANASAL SINUSES:  Normal  VASCULATURE: Poorly visualized right middle cerebral artery CALVARIUM AND SKULL BASE:  Normal  EXTRACRANIAL SOFT TISSUES:  Normal      Impression: Large acute right MCA territory infarction primarily involving the temporal lobe with lesser degree of involvement of the inferolateral frontal lobe and anterior parietal lobe    Consistent with CT/CTA Development of acute microhemorrhage within the right lenticular nucleus and adjacent temporal lobe Findings personally discussed by phone with Dr Janice Trejo at 3:10 PM, 7/18/2019 Workstation performed: [id number]     Ct Stroke Alert Brain    Result Date: 7/17/2019  Narrative: CT BRAIN - STROKE ALERT PROTOCOL INDICATION:   Acute stroke  COMPARISON:  None  TECHNIQUE:  CT examination of the brain was performed  In addition to axial images, coronal reformatted images were created and submitted for interpretation  Radiation dose length product (DLP) for this visit:  1017 2 mGy-cm   This examination, like all CT scans performed in the [facility], was performed utilizing techniques to minimize radiation dose exposure, including the use of iterative  reconstruction and automated exposure control  IMAGE QUALITY:  Diagnostic  FINDINGS:  PARENCHYMA:  Cortical and subcortical hypoattenuation with sulcal effacement in the right frontoparietal temporal region, also involving the posterior insula, right basal ganglia and external capsule  No evidence of intracranial hemorrhage  No midline shift  Periventricular and subcortical hypoattenuating foci consistent with microangiopathic disease  Increased density in right M1 and proximal M2 branches likely represent thrombus  VENTRICLES AND EXTRA-AXIAL SPACES:  No hydrocephalus or extra-axial collection  VISUALIZED ORBITS AND PARANASAL SINUSES:  Intact globes and orbits  Chronic paranasal sinus disease  CALVARIUM AND EXTRACRANIAL SOFT TISSUES:  No lytic or blastic lesion or soft tissue mass  Impression: 1  Acute to early subacute right MCA territory infarct involving greater than one third of the vascular territory  No evidence of hemorrhagic conversion or significant mass effect  2   Hyperdense right M1 and M2 branches suggesting acute thrombus   Findings were directly discussed with Lindsay Peralta on 7/17/2019 7:16 PM  [employer] performed: [id number]     Mri Pelvis Bony Wo And W Contrast    Result Date: 7/19/2019  Narrative: MRI BONY PELVIS WITH AND WITHOUT CONTRAST INDICATION:   Rigth hip pain, r/o non discplaced fracture  Stroke COMPARISON: X-ray from prior day TECHNIQUE:  MR sequences were obtained of the pelvis including: Precontrast: Localizer, Coronal STIR or coronal T2 fat sat, coronal T1, axial T1, axial T2 fat sat, sagittal T2 fat sat, Sagittal T1, axial LAVA T1 fat sat  Post contrast: axial LAVA T1 fat sat, coronal LAVA T1 fat sat  IV Contrast:  8 mL of Gadobutrol injection (SINGLE-DOSE) FINDINGS: RIGHT HIP: -JOINT EFFUSION: None  -BONE MARROW SIGNAL AND ALIGNMENT: Normal marrow signal demonstrated without hip fracture or AVN  -TROCHANTERIC BURSA: Contains fluid -MUSCLES AND TENDONS:  There is diffuse asymmetric hypertrophy of the right gluteus jarrod, with T2 bright signal   Relative hypoperfusion is seen near the origin  No enhancing mass or collection LEFT HIP: -JOINT EFFUSION: None  -BONE MARROW SIGNAL AND ALIGNMENT: Normal marrow signal demonstrated without hip fracture or AVN  -TROCHANTERIC BURSA: Normal  -MUSCLES AND TENDONS:  Intramuscular edema is seen within the proximal aspect of the vastus musculature proximally, and within the gluteus minimus BONY PELVIS: -SI JOINTS AND SYMPHYSIS PUBIS:   Intact  -VISUALIZED LUMBAR SPINE:  Unremarkable  -OVERALL MARROW SIGNAL:  Normal, without suspicious focal lesions  -MUSCULATURE:  As above -PELVIC SOFT TISSUES:   Additionally there is subcutaneous edema within the soft tissues surrounding the right hip SUBCUTANEOUS TISSUES: Normal     Impression: 1  Asymmetric hypertrophy and edema within the right gluteus jarrod, consider rhabdomyolysis 2  No fracture Workstation performed: [id number]     Xr Hips Bilateral 3-4 Vw W Pelvis If Performed    Result Date: 7/18/2019  Narrative: BILATERAL HIPS AND PELVIS INDICATION:   fall   COMPARISON:  None VIEWS:  XR HIPS BILATERAL 3-4 VW W PELVIS IF PERFORMED  FINDINGS: Possible nondisplaced right superior pubic ramus fracture  Correlate for point tenderness  Visualized lower lumbar spine is unremarkable  LEFT HIP: No significant hip degenerative changes  Joint space alignment is maintained  Soft tissues are unremarkable  RIGHT HIP: No significant hip degenerative changes  Joint space alignment is maintained  Soft tissues are unremarkable  Impression: Possible nondisplaced right superior pubic ramus fracture  Correlate for point tenderness  Workstation performed: [id number]     Cta Stroke Alert (head/neck)    Result Date: 7/17/2019  Narrative: CTA NECK AND BRAIN WITH AND WITHOUT CONTRAST INDICATION: Acute stroke  COMPARISON:   7/17/2019  TECHNIQUE:  Post contrast imaging was performed after administration of iodinated contrast through the neck and brain  Post contrast axial 0 625 mm images timed to opacify the arterial system  3D rendering was performed on an independent workstation  MIP reconstructions performed  Coronal reconstructions were performed of the noncontrast portion of the brain  Radiation dose length product (DLP) for this visit:  353 18 mGy-cm   This examination, like all CT scans performed in the [facility], was performed utilizing techniques to minimize radiation dose exposure, including the use of iterative  reconstruction and automated exposure control  IV Contrast:  85 mL of iohexol (OMNIPAQUE)  IMAGE QUALITY:   Motion artifact results in suboptimal evaluation FINDINGS: CERVICAL VASCULATURE AORTIC ARCH AND GREAT VESSELS:  Three-vessel configuration aortic arch  Great vessel origins not well evaluated due to motion artifact  No significant stenosis in the subclavian arteries  RIGHT VERTEBRAL ARTERY CERVICAL SEGMENT:  Normal origin  The vessel is normal in caliber throughout the neck  LEFT VERTEBRAL ARTERY CERVICAL SEGMENT:  Normal origin  The vessel is normal in caliber throughout the neck   RIGHT EXTRACRANIAL CAROTID SEGMENT:  Normal caliber common carotid artery  Calcified plaque at the bifurcation and ICA origin  Severe (70-90%) stenosis at the ICA origin  Decreased caliber of the vessel throughout the cervical segment with intimal thickening  LEFT EXTRACRANIAL CAROTID SEGMENT:  Normal caliber common carotid artery  Calcified plaque the bifurcation and ICA origin resulting in mild to moderate (50-60%) stenosis  NASCET criteria was used to determine the degree of internal carotid artery diameter stenosis  INTRACRANIAL VASCULATURE INTERNAL CAROTID ARTERIES:  Diminutive right carotid siphon likely secondary to severe proximal stenosis  Calcified plaque throughout the left carotid siphon without significant stenosis  Patent ophthalmic arteries  ANTERIOR CIRCULATION:  Absent or severely hypoplastic right A1 segment  Anterior to indicating artery visualized  No stenosis in the anterior cerebral arteries  MIDDLE CEREBRAL ARTERY CIRCULATION: Absence of opacification of the proximal right middle cerebral artery  Minimal enhancement of distal M3 branches suggesting poor collateral flow  No stenosis in the proximal right middle cerebral artery  DISTAL VERTEBRAL ARTERIES:  Normal distal vertebral arteries  Posterior inferior cerebellar artery origins are normal  Normal vertebral basilar junction  BASILAR ARTERY:  Basilar artery is normal in caliber  Normal superior cerebellar arteries  POSTERIOR CEREBRAL ARTERIES: Both posterior cerebral arteries arises from the basilar tip  Both arteries demonstrate normal enhancement  Tiny bilateral posterior communicating arteries  DURAL VENOUS SINUSES:  Limited evaluation without evidence of or thrombosis  NON VASCULAR ANATOMY BONY STRUCTURES:  No acute osseous abnormality  SOFT TISSUES OF THE NECK:  Normal  THORACIC INLET:  Unremarkable  Impression: 1  Severe (70-90%) stenosis at the origin of the right internal carotid artery    Remainder of the vessel is diminutive likely secondary to proximal stenosis  2   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  Very minimal opacification of distal M3 branches suggesting poor collateral flow  I personally discussed this study with Mami Santiago on 2019 at 7:14 PM  Workstation performed: [id number]        EKG / Monitor: Personally reviewed  Afib, w/ RVR and RBBB on  EKG    Cardiac testing:   Results for orders placed during the hospital encounter of 19   Echo complete with contrast if indicated    Bobbi Cadet [address]  [address] [facility]   [phone]    Transthoracic Echocardiogram  2D, M-mode, Doppler, and Color Doppler    Study date:  2019    Patient: Juliano Leos  MR number: [id number]  Account number: [de-identified]  : 1935  Age: 81 years  Gender: Male  Status: Inpatient  Location: Bedside  Height: 66 in 66 in  Weight: 177 5 lb 177 5 lb  BP: 92/ 54 mmHg    Indications: Cerebral Thrombosis    Diagnoses: I67 9 - Cerebrovascular disease, unspecified    Sonographer:  ABI Clark  Primary Physician:  Dar Allen MD  Referring Physician:  WYATT Lewis  Group:  [facility]  Interpreting Physician:  Kodak Mosley MD    SUMMARY    PROCEDURE INFORMATION:  This was a technically difficult study  Echocardiographic views were limited due to restricted patient mobility, poor patient compliance, poor acoustic window availability, and lung interference  LEFT VENTRICLE:  Ejection fraction was estimated to be around 50 %  Although no diagnostic regional wall motion abnormality was identified, this possibility cannot be completely excluded on the basis of this study  Wall thickness was mildly increased  There was mild concentric hypertrophy  Doppler parameters were consistent with abnormal left ventricular relaxation (grade 1 diastolic dysfunction)      ATRIAL SEPTUM:  No Large ASD or large PFO identified by buuble study  Buuble study over all limited  MITRAL VALVE:  There was mild regurgitation  AORTIC VALVE:  Transaortic velocity was increased due to valvular stenosis  There was mild to moderate stenosis  peak velocity 2 33 m/se, Peak gradient 23, mean 11 mm of Hg and DENZEL around 1 35 to 1 4 cm2  TRICUSPID VALVE:  There was mild regurgitation  Estimated peak PA pressure was 35 mmHg  HISTORY: PRIOR HISTORY: Neuralgia, Colon Cancer    PROCEDURE: The procedure was performed at the bedside  This was a routine study  The transthoracic approach was used  The study included complete 2D imaging, M-mode, complete spectral Doppler, and color Doppler  The heart rate was 64 bpm,  at the start of the study  Intravenous contrast (agitated saline, 10 ml) was administered to evaluate shunting  Intravenous contrast ( 10 ml) was administered  Echocardiographic views were limited due to restricted patient mobility, poor  patient compliance, poor acoustic window availability, and lung interference  This was a technically difficult study  LEFT VENTRICLE: Size was normal  Ejection fraction was estimated to be around 50 %  Although no diagnostic regional wall motion abnormality was identified, this possibility cannot be completely excluded on the basis of this study  Wall  thickness was mildly increased  There was mild concentric hypertrophy  DOPPLER: Doppler parameters were consistent with abnormal left ventricular relaxation (grade 1 diastolic dysfunction)  RIGHT VENTRICLE: The size was normal  Systolic function was normal     LEFT ATRIUM: Size was normal     ATRIAL SEPTUM: No Large ASD or large PFO identified by buuble study  Buuble study over all limited  RIGHT ATRIUM: Size was normal     MITRAL VALVE: There was normal leaflet separation  DOPPLER: The transmitral velocity was within the normal range  There was no evidence for stenosis  There was mild regurgitation      AORTIC VALVE: The valve was probably trileaflet  Leaflets exhibited mildly increased thickness, mild calcification, and normal cuspal separation  DOPPLER: Transaortic velocity was increased due to valvular stenosis  There was mild to  moderate stenosis  peak velocity 2 33 m/se, Peak gradient 23, mean 11 mm of Hg and DENZEL around 1 35 to 1 4 cm2  There was no regurgitation  TRICUSPID VALVE: DOPPLER: There was mild regurgitation  Estimated peak PA pressure was 35 mmHg  PULMONIC VALVE: DOPPLER: There was no significant regurgitation  PERICARDIUM: There was no thickening or calcification  There was no pericardial effusion  AORTA: The root exhibited normal size  SYSTEMIC VEINS: IVC: The inferior vena cava was not well visualized  SYSTEM MEASUREMENT TABLES    2D mode  AoR Diam 2D: 3 6 cm  LA Diam (2D): 4 3 cm  LA/Ao (2D): 1 19  FS (2D Teich): 22 4 %  IVSd (2D): 1 24 cm  LVDEV: 124 cmï¾³  LVESV: 68 3 cmï¾³  LVIDd(2D): 5 1 cm  LVISd (2D): 3 96 cm  LVOT Area 2D: 3 14 cmï¾²  LVPWd (2D): 1 19 cm  SV (Teich): 55 7 cmï¾³    Apical four chamber  LVEF A4C: 45 %    Unspecified Scan Mode  DENZEL Cont Eq (Peak Patrick): 1 23 cmï¾²  DENZEL Cont Eq (VTI): 1 35 cmï¾²  LVOT (VTI): 18 8 cm  LVOT Diam : 2 cm  LVOT Vmax: 928 mm/s  LVOT Vmax; Mean: 902 mm/s  Peak Grad ; Mean: 3 mm[Hg]  SV (LVOT): 62 cmï¾³  VTI;Mean: 2 mm[Hg]  MV Peak A Patrick: 607 mm/s  MV Peak E Patrick  Mean: 408 mm/s  MVA (PHT): 3 67 cmï¾²  PHT: 60 ms  Max P mm[Hg]  V Max: 2780 mm/s  Vmax: 2780 mm/s  RA Area: 19 8 cmï¾²  RA Volume: 58 5 cmï¾³  TAPSE: 2 1 cm    [employer] Accredited [facility]chocardi[facility]    Prepared and electronically signed by    Merritt Cornelius MD  Signed 2019 11:00:17             Kassy Weaver DO      "This note has been constructed using a voice recognition system  Therefore there may be syntax, spelling, and/or grammatical errors   Please call if you have any questions  "

## 2019-07-22 NOTE — PROGRESS NOTES
[facility] Internal Medicine Progress Note  Patient: Shelly Méndez 81 y o  male   MRN: [id number]  PCP: Kelly Alicia MD  Unit/Bed#: ICU 12 Encounter: [id number]  Date Of Visit: 07/22/19    Problem List:    Principal Problem:    Cerebrovascular accident (CVA) due to thrombosis of right middle cerebral artery ([address] )  Active Problems:    Atrial fibrillation with RVR ([address] )    Respiratory distress    Traumatic rhabdomyolysis ([facility] [address] )    Right hip pain    Urinary retention    Insomnia    Depression with anxiety    Impaired swallowing    Symptomatic stenosis of right carotid artery      Assessment & Plan:    Respiratory distress  Assessment & Plan  Multifactorial secondary to muscular weakness, difficulty in clearing secretion, atelectasis,  volume overload  Chest x-ray with small left-sided effusion without any acute findings  Patient is afebrile  Remains with significant secretion requiring frequent suctioning  Percussion vest and chest PT  Reconfirmed with the patient regarding DNR/DNI status  Continue BiPAP as needed, Patient does not prefer BiPAP but willing to continue at present  Continue aspiration precaution  Monitor intake and output  Continue IV Lasix as needed to maintain euvolemia      Atrial fibrillation with RVR (HCC)  Assessment & Plan  New onset, unclear if contributary or subsequent to CVA  Noted to be in new onset rapid AFib with associated tachypnea on 07/19  Heart rate control is better overall better with Cardizem drip  Blood pressure remains acceptable  Status post digoxin loading on 07/19  Heart rate is better after digoxin  Will discontinue Cardizem drip  IV metoprolol  Cardiology evaluation appreciated  Monitor electrolytes  Monitor intake and output, IV Lasix x1 today    Continue diuresis as needed  Follow-up CT with stable findings  Started on IV heparin    * Cerebrovascular accident (CVA) due to thrombosis of right middle cerebral artery [facility])  Assessment & Plan  Patient brought in by EMS after being found on floor with altered mental status by his step-daughter  Patient was last seen normal on Monday, patient was then found down on the bathroom floor with left-sided weakness and altered mentation 7/17,   Patient was a stroke alert  NIH scale 13 on admission  Patient displayed right sided gaze preference, left sided weakness, left sided facial droop  Speech incoherent  CT head, CTA head neck revealed "Severe (70-90%) stenosis at the origin of the right internal carotid artery   Remainder of the vessel is diminutive likely secondary to proximal stenosis  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   Very minimal opacification of distal M3 branches suggesting poor collateral flow"  Endovascular Neurology was contacted by ED, who declined intervention given age and unknown onset   Dr Amarjit Peña with Neurology was contacted, patient not a tPA candidate  Recommended 300mg rectal ASA and stroke pathway with close neurologic monitoring overnight given large infarct with possibility of swelling as timing of infarct is unknown  MRI of the brain - Large acute right MCA territory infarction primarily involving the temporal lobe with lesser degree of involvement of the inferolateral frontal lobe and anterior parietal lobe   Development of acute microhemorrhage within the right lenticular nucleus and adjacent temporal lobe  2D echo-EF 17%, grade 1 diastolic dysfunction  No shunt noted  CVA most likely secondary to symptomatic high-grade right carotid stenosis, but cannot rule out contribution from AFib  Neuro remained stable, hold sedating medication  , hemoglobin A1c 6  Allow permissive hypertension with SBP goal 130-140  Repeat CT head - Evolving right MCA territory infarction with petechial hemorrhage in the right basal ganglia, stable  NPO until passes swallow therapy, VBSS in a m    Continue rectal aspirin  Lipitor 40 mg daily for now but increase to 80 mg once rhabdomyolysis is improved, follow-up CPK  Discussed with Cardiology and Neurology, patient was started on IV heparin  PT/OT/ST  Vascular surgery consultation noted, recommended follow-up as outpatient for carotid endarterectomy  Follow-up baseline carotid ultrasound  Patient will need anticoagulation in light of AFib based on CT scan findings  Neurology following  Patient is a agreeable for short-term feeding tube if needed, follow-up VBSS  Urinary retention  Assessment & Plan  Bladder scan noted to have more than 800 cc of urinary retention  Setting of CVA, a KI, rhabdomyolysis, pelvic fracture  Cohen catheter was placed  Voiding trial when ambulating    Right hip pain  Assessment & Plan  Patient reported pain on the right groin    Related to patient's fall  X-ray revealed possibility of nondisplaced right superior pubic ramus fracture  Orthopedic input appreciated, recommended MRI of the right pelvis/hip  MRI-no evidence of fracture but revealed Asymmetric hypertrophy and edema within the right gluteus jarrod  Follow up orthopedic recommendation  PT OT as tolerated    Traumatic rhabdomyolysis [facility])  Assessment & Plan  Patient was found lying in the floor for prolonged preop  CPK noted to be 5381 on admission, increase to 6896 now and downtrending  MRI revealed asymmetric hypertrophy and edema within the right gluteus jarrod, likely cause of rhabdomyolysis  Continue IV fluids, monitor renal function, intake and output  PT/OT as tolerated    Acute kidney injury (HCC)resolved as of 7/20/2019  Assessment & Plan  Creatinine 1 5 on presentation  Likely secondary to prerenal, rhabdomyolysis, urinary retention  Improving with IV fluids  Continue to monitor intake and output    Leukocytosisresolved as of 7/20/2019  Assessment & Plan  Presented with leukocytosis of 16 K  Likely reactive  Chest x-ray without any acute infiltrate, UA without evidence of UTI  Blood cultures pending  Improving    Elevated lactic acid levelresolved as of 2019  Assessment & Plan  Lactic acid 3 3 on presentation, subsequently improved with IV fluids    Depression with anxiety  Assessment & Plan  Hold home medication mirtazapine and seroquel    Insomnia  Assessment & Plan  Hold home medication mirtazapine and seroquel    A    VTE Pharmacologic Prophylaxis:   Pharmacologic: Heparin  Mechanical VTE Prophylaxis in Place: Yes    Patient Centered Rounds: I have performed bedside rounds with nursing staff today  Discussions with Specialists or Other Care Team Provider:  Yes, Dr Tessy Mortensen    Education and Discussions with Family / Patient:  yes, family at bedside    Time Spent for Care: 45 minutes  More than 50% of total time spent on counseling and coordination of care as described above  Current Length of Stay: 5 day(s)    Current Patient Status: Inpatient   Certification Statement: The patient will continue to require additional inpatient hospital stay due to Acute CVA, AFib    Discharge Plan:  Anticipate rehab    Code Status: Level 3 - DNAR and DNI      Subjective:   Respiratory status appears better  Patient is comfortable, more alert   Able to clear secretion better  Denies any chest shortness of breath or abdominal pain  Right hip pain is controlled    After discussion with the family member, patient agreed to have feeding tube placed for short duration only        Objective:   Comfortable  Vitals:   Temp (24hrs), Av 9 °F (36 6 °C), Min:97 1 °F (36 2 °C), Max:98 9 °F (37 2 °C)    Temp:  [97 1 °F (36 2 °C)-98 9 °F (37 2 °C)] 97 9 °F (36 6 °C)  HR:  [] 89  Resp:  [25-40] 27  BP: (113-163)/(61-95) 163/87  SpO2:  [94 %-99 %] 98 %  Body mass index is 25 4 kg/m²  Input and Output Summary (last 24 hours):        Intake/Output Summary (Last 24 hours) at 2019 0908  Last data filed at 2019 0701  Gross per 24 hour   Intake 1455 42 ml   Output 850 ml   Net 605 42 ml       Physical Exam:     Physical Exam   Constitutional: He appears well-developed  He is cooperative  No distress  HENT:   Head: Normocephalic and atraumatic  Eyes: Pupils are equal, round, and reactive to light  Conjunctivae are normal    Neck: Normal range of motion  Neck supple  Cardiovascular: Normal rate  An irregularly irregular rhythm present  Murmur heard  Pulmonary/Chest: Effort normal  No respiratory distress  He has no wheezes  He has no rhonchi  He has no rales  He exhibits no tenderness  Diminished  Coarse breath sounds with rhonchi   Abdominal: Soft  Bowel sounds are normal  He exhibits no distension  There is no tenderness  There is no rebound and no guarding  A hernia is present  Genitourinary:   Genitourinary Comments: Cohen in place   Musculoskeletal: He exhibits no edema  Tenderness over right hip   Neurological: He is alert  GCS eye subscore is 4  GCS verbal subscore is 4  GCS motor subscore is 6  Stable  Left-sided hemiplegia, left left-sided sensory loss  Mild dysarthria, dysphagia    Skin: Skin is warm and dry  No rash noted  Additional Data:     Labs:    Results from last 7 days   Lab Units 07/22/19  0631 07/21/19  0624   WBC Thousand/uL 8 31 9 80   HEMOGLOBIN g/dL 10 8* 11 2*   HEMATOCRIT % 33 7* 34 5*   PLATELETS Thousands/uL 232 246   NEUTROS PCT %  --  72   LYMPHS PCT %  --  12*   LYMPHO PCT % 24  --    MONOS PCT %  --  13*   MONO PCT % 12  --    EOS PCT % 4 0     Results from last 7 days   Lab Units 07/22/19  0631  07/20/19  0430   POTASSIUM mmol/L 3 7   < > 3 4*   CHLORIDE mmol/L 110*   < > 110*   CO2 mmol/L 22   < > 23   BUN mg/dL 20   < > 17   CREATININE mg/dL 0 96   < > 1 07   CALCIUM mg/dL 8 7   < > 7 9*   ALK PHOS U/L  --   --  76   ALT U/L  --   --  35   AST U/L  --   --  74*    < > = values in this interval not displayed  Results from last 7 days   Lab Units 07/17/19  1905   INR  1 05       * I Have Reviewed All Lab Data Listed Above  * Additional Pertinent Lab Tests Reviewed:  All Labs Within Last 24 Hours Reviewed      Imaging:  Imaging Reports Reviewed Today Include:  Chest x-ray, CT      Recent Cultures (last 7 days):     Results from last 7 days   Lab Units 07/17/19  2100   BLOOD CULTURE  No Growth at 72 hrs  No Growth at 72 hrs  Last 24 Hours Medication List:     Current Facility-Administered Medications:  acetaminophen 325 mg Rectal Q4H PRN Leslie Canales MD    aspirin 300 mg Rectal Daily Josiah Steinberg PA-C    atorvastatin 40 mg Oral QPM WYATT Hunter    dextrose 5 % and sodium chloride 0 9 % with KCl 20 mEq/L 75 mL/hr Intravenous Continuous Leslie Canales MD Last Rate: 75 mL/hr (07/22/19 0701)   digoxin 125 mcg Intravenous Daily Selena Chavez MD    diltiazem 1-15 mg/hr Intravenous Titrated Selena Chavez MD Last Rate: Stopped (07/21/19 1951)   furosemide 20 mg Intravenous Once [place] MD Parker    heparin (porcine) 300-2,000 Units/hr Intravenous Titrated Leslie Canales MD Last Rate: 800 Units/hr (07/22/19 0701)   HYDROmorphone 0 2 mg Intravenous Q4H PRN Leslie Canales MD    ipratropium 0 5 mg Nebulization Q6H Leslie Canales MD    Labetalol HCl 10 mg Intravenous Q6H PRN WYATT Hunter    levalbuterol 1 25 mg Nebulization Q6H Leslie Canales MD    And        sodium chloride 3 mL Nebulization Q6H Leslie Canales MD    pneumococcal 13-valent conjugate vaccine 0 5 mL Intramuscular Prior to discharge Amaya Maldonado, [facility]           Today, Patient Was Seen By: Leslie Canales MD    ** Please Note: "This note has been constructed using a voice recognition system  Therefore there may be syntax, spelling, and/or grammatical errors   Please call if you have any questions  "**

## 2019-07-22 NOTE — SPEECH THERAPY NOTE
Speech Language/Pathology    Speech/Language Pathology Progress Note    Patient Name: Celio San  [id number] Date: 7/22/2019    Subjective:  Patient seen for follow up diagnostic dysphagia treatment  Per RN he is doing much better today and requesting reassessment to determine readiness for po  Family present throughout my visit  Patient has now been NPO for x6 days  Objective:  Patient conversive today although speech is primarily unintelligible- suspected this is dysarthria more then an aphasia- with hoarse and at times diplophonic? vocal quality  Oral mechanism reassessed and he continues with left facial droop, left lingual deviation with left sided weakness and reduced ROM  He also continues with a weak cough with fair management of secretions although family, patient and RN reports secretion management is improved  Patient consumed x3 tsps of puree with adequate bolus retrieval and reduced yet functional bolus formation and transfer  Lingual pumping was noted with delayed swallow and reduced hyolaryngeal elevation  Delayed overt coughing present again therefore additional po was discontinued  Discussed aspiration concerns with patient, family, RN and MD as well as recommendations for VBSS to further assess    Reciprocal comprehension was verbally expressed  Assessment:  Patients respiration/phonation as well as secretion management and MOSES appears to be improving however there is concern for vocal pathology and s/sx of aspiration remains  VBSS is recommended to further assess and determine if PEG is necessary  ENT consult may also be appropriate pending VBSS results       Plan/Recommendations:  Continue strict NPO  VBSS is planned/scheduled for 7/23 at [facility] , [address] [place] [address]  Speech Language Pathologist   [id number]

## 2019-07-22 NOTE — OCCUPATIONAL THERAPY NOTE
OT TREATMENT       07/22/19 1113   Restrictions/Precautions   Other Precautions Chair Alarm; Bed Alarm; Fall Risk;Pain;Cognitive  (L neglect)   General   Family/Caregiver Present Yes   Pain Assessment   Pain Assessment No/denies pain   Pain Score No Pain   ADL   Grooming Assistance 3  Moderate Assistance   Grooming Deficit Wash/dry hands  (Applying lotion to hands)   Grooming Comments Pt supine with head of bed raised  Pt washed hands with warm wash cloth after setup assistance  Required mod a for bilateral coordination and active use of RUE  Pt applied lotion to R and L hand with mod a to rub lotion in  Verbal cues to look at hands while completing task to increase awareness of L side  No active motion of LUE noted throughout activity  ROM - Left Upper Extremities    L Shoulder AAROM; Flexion; Horizontal ABduction; External rotation; Internal rotation   L Elbow AAROM;Elbow flexion;Elbow extension   L Wrist AAROM;Wrist flexion;Wrist extension   L Hand AROM; Thumb; Index finger; Long finger;Ring finger;Little finger  (Full flexion all fingers, opposition thumb)   L Position Supine  (Head of bed raised)   L Weight/Reps/Sets 2 sets of 10 each stretch   LUE ROM Comment OT encouraged active participation in ROM activity  Taping to increase activation of biceps during shoulder and elbow flexion  Noted trace active flexion of elbow and thumb  Pt with minimal active participate in ROM activitties but able to attend to L side with verbal cues  Neuromuscular Education   Taping Tapping L bicep with trace muscle activation during elbow flexion  Head/Neck Control Scanning/tracking activity, encouraged patient to turn head and eyes to L   Frequent verbal cues required with increased time to focus on objects outside of immediate environment  Comments Pt demonstrating increased active participation but still demonstrating minimal active movement of L side and requires cues to attend to L side of body     Cognition   Overall Cognitive Status Impaired   Vision   Occulomotor Tracking   Vision Comments Pt with decreased ability to track targets into L visual field  Both eyes stopping at midline and resumed tracking when target brought to visual midline  Activity Tolerance   Activity Tolerance Patient tolerated treatment well   Medical Staff Made Aware Yes, nurse Janett Lin approved of treatment  Assessment   Assessment Pt demonstrating increased active participation in activities  Pt demonstrating trace muscle activation in LUE  Pt will continue to benefit from skilled occupational therapy services to increase independence and safety with ADLs and functional mobility  Plan   Treatment Interventions ADL retraining;UE strengthening/ROM; Endurance training;Patient/family training; Activityengagement   Recommendation   OT Discharge Recommendation [id number] [facility] License Number  Dara Patient, [place] [address] [id number]

## 2019-07-23 NOTE — SOCIAL WORK
LOS - 7 days    SW following to monitor needs and assist with planning  STR placement is being recommended once medically stable  Referral has been made to [facility]  [facility] admissions is reviewing referral   EARC-PAS request has been sent to [facility] for completion  SW met with pt and family to discuss plans yesterday  Pt also expressed interest in completing advanced directives  SW will continue to follow to monitor progress and assist with planning as needed

## 2019-07-23 NOTE — PROGRESS NOTES
Progress Note - Mariana Mago 1935, 81 y o  male MRN: [id number]    Unit/Bed#: ICU 12 Encounter: [id number]    Primary Care Provider: Estefanía Beach MD   Date and time admitted to hospital: 7/17/2019  6:48 PM        Respiratory distress  Assessment & Plan  Multifactorial secondary to muscular weakness, difficulty in clearing secretion, atelectasis,  volume overload  Chest x-ray with small left-sided effusion without any acute findings  Patient is afebrile  Remains with significant secretion requiring frequent suctioning  Percussion vest and chest PT  Reconfirmed with the patient regarding DNR/DNI status  Continue BiPAP as needed, Patient does not prefer BiPAP but willing to continue at present  Continue aspiration precaution  Monitor intake and output  Continue IV Lasix as needed to maintain euvolemia      Symptomatic stenosis of right carotid artery  Assessment & Plan  Per vascular surgery follow up as OP for elective CEA evaluation once more stable    Atrial fibrillation with RVR [facility])  Assessment & Plan  New onset, unclear if contributary or subsequent to CVA  Noted to be in new onset rapid AFib with associated tachypnea on 07/19  Heart rate improved with Cardizem drip  Blood pressure remains acceptable  Status post digoxin loading on 07/19  Heart rate is better after digoxin  cardizem gtt stopped  · Cardiology evaluation appreciated  · Monitor intake and output, Continue diuresis as needed  · Started on IV heparin, start AC once able to take PO  · Change IV to PO metoprolol once able to take in PO    Impaired swallowing  Assessment & Plan  Plan as noted    Urinary retention  Assessment & Plan  Bladder scan noted to have more than 800 cc of urinary retention  Setting of CVA, ELOISE, rhabdomyolysis, pelvic fracture  Cohen catheter was placed  Voiding trial when ambulating or more mobile    Right hip pain  Assessment & Plan  Patient reported pain on the right groin    Related to patient's fall  X-ray revealed possibility of nondisplaced right superior pubic ramus fracture  Orthopedic input appreciated, recommended MRI of the right pelvis/hip  MRI-no evidence of fracture but revealed Asymmetric hypertrophy and edema within the right gluteus jarrod  PT OT as tolerated    Depression with anxiety  Assessment & Plan  Hold home medication mirtazapine and seroquel    Traumatic rhabdomyolysis [facility])  Assessment & Plan  Patient was found lying in the floor for prolonged preop  CPK noted to be 5381 on admission, increase to 6896 and now downtrending  MRI revealed asymmetric hypertrophy and edema within the right gluteus jarrod, likely cause of rhabdomyolysis  Continue IV fluids, monitor renal function, intake and output  PT/OT as tolerated    Insomnia  Assessment & Plan  Hold home medication mirtazapine and seroquel    * Cerebrovascular accident (CVA) due to thrombosis of right middle cerebral artery [facility])  Assessment & Plan  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"  Endovascular Neurology was contacted by ED, who declined intervention given age and unknown onset   Dr [place] with Neurology was contacted, patient not a tPA candidate   Recommended 300mg rectal ASA and stroke pathway with close neurologic monitoring overnight given large infarct with possibility of swelling as timing of infarct is unknown  MRI of the brain - Large acute right MCA territory infarction primarily involving the temporal lobe with lesser degree of involvement of the inferolateral frontal lobe and anterior parietal lobe   Development of acute microhemorrhage within the right lenticular nucleus and adjacent temporal lobe  2D echo-EF 56%, grade 1 diastolic dysfunction  No shunt noted  CVA most likely secondary to symptomatic high-grade right carotid stenosis, but cannot rule out contribution from AFib  Neuro remained stable, hold sedating medications  , hemoglobin A1c 6  Allow permissive hypertension with SBP goal 130-140  Repeat CT head - Evolving right MCA territory infarction with petechial hemorrhage in the right basal ganglia, stable  · Continue full dose aspirin  · Lipitor 40 mg daily for now but increase to 80 mg once rhabdomyolysis is improved, follow-up CPK  · Discussed with Cardiology and Neurology, patient was started on IV heparin  · PT/OT/ST  · Vascular surgery consultation noted, recommended follow-up as outpatient for carotid endarterectomy  Follow-up baseline carotid ultrasound  · Patient will need anticoagulation in light of AFib based on CT scan findings  · VBSS done today with aspiration- ok to start puree with honey thick liquids  · Clinically patient very high risk of aspiration, and cannot meet caloric intake needs on current diet     · GI consulted for PEG tube, patient agreeable to it for short term, does not want it lifelong    Acute kidney injury (HCC)resolved as of 7/20/2019  Assessment & Plan  Creatinine 1 5 on presentation  Likely secondary to prerenal, rhabdomyolysis, urinary retention  Improving with IV fluids  Continue to monitor intake and output    Leukocytosisresolved as of 7/20/2019  Assessment & Plan  Presented with leukocytosis of 16 K  Likely reactive  Chest x-ray without any acute infiltrate, UA without evidence of UTI  Blood cultures pending  Improving    Elevated lactic acid levelresolved as of 7/20/2019  Assessment & Plan  Lactic acid 3 3 on presentation, subsequently improved with IV fluids      VTE Pharmacologic Prophylaxis:   Pharmacologic: Heparin Drip  Mechanical VTE Prophylaxis in Place: Yes    Patient Centered Rounds: I have performed bedside rounds with nursing staff today  Discussions with Specialists or Other Care Team Provider: Yes  Education and Discussions with Family / Patient:Yes  Time Spent for Care: 45 minutes  More than 50% of total time spent on counseling and coordination of care as described above  Current Length of Stay: 6 day(s)  Current Patient Status: Inpatient     Discharge Plan: pending    Code Status: Level 3 - DNAR and DNI      Subjective:   Patient sleeping  Awoke easily to voice  Denies complaints this morning      Objective:     Vitals:   Temp (24hrs), Av 7 °F (36 5 °C), Min:97 3 °F (36 3 °C), Max:98 2 °F (36 8 °C)    Temp:  [97 3 °F (36 3 °C)-98 2 °F (36 8 °C)] 97 3 °F (36 3 °C)  HR:  [] 98  Resp:  [21-35] 28  BP: (119-166)/(74-95) 166/93  SpO2:  [90 %-99 %] 99 %  Body mass index is 25 46 kg/m²  Input and Output Summary (last 24 hours): Intake/Output Summary (Last 24 hours) at 2019 0837  Last data filed at 2019 0541  Gross per 24 hour   Intake  24 ml   Output 2060 ml   Net -32 76 ml        Physical Exam:     Physical Exam   Constitutional: He appears well-developed  He appears listless  No distress  Ill appearing   HENT:   Head: Normocephalic and atraumatic  Cardiovascular:   Murmur heard  irregular   Pulmonary/Chest: No respiratory distress  He has no wheezes  He has no rales  Effort fair, decreased breath sounds, + scattered rhonchi    Abdominal: Soft  Bowel sounds are normal  He exhibits no distension  There is no tenderness  There is no rebound  Genitourinary:   Genitourinary Comments: Cohen in place, clear yellow urine   Neurological: He appears listless  Left hemiplegia   Skin: Skin is warm and dry  Psychiatric: He has a normal mood and affect  His behavior is normal    Nursing note and vitals reviewed        Additional Data:     Labs:    Results from last 7 days   Lab Units 19  0307 19  0631 19  0624 19  0430   WBC Thousand/uL 9 65 8 31 9 80 8 20   HEMOGLOBIN g/dL 10 8* 10 8* 11 2* 10 3*   HEMATOCRIT % 33 2* 33 7* 34 5* 31 8*   PLATELETS Thousands/uL 270 232 246 172   NEUTROS PCT % 63  --  72 67     Results from last 7 days   Lab Units 07/23/19  0307 07/22/19  0631 07/21/19  0624 07/20/19  0430 07/19/19  0518  07/17/19  1905   SODIUM mmol/L 142 142 139 144 144   < > 138   POTASSIUM mmol/L 3 7 3 7 3 8 3 4* 3 7   < > 4 7   CHLORIDE mmol/L 108 110* 107 110* 111*   < > 104   CO2 mmol/L 24 22 19* 23 18*   < > 24   BUN mg/dL 19 20 19 17 19   < > 17   CREATININE mg/dL 0 95 0 96 1 02 1 07 1 21   < > 1 55*   CALCIUM mg/dL 8 2* 8 7 8 9 7 9* 7 7*   < > 8 0*   TOTAL BILIRUBIN mg/dL  --   --   --  0 90 0 80  --  0 40   ALK PHOS U/L  --   --   --  76 68  --  92   ALT U/L  --   --   --  35 24  --  20   AST U/L  --   --   --  74* 72*  --  78*    < > = values in this interval not displayed  Results from last 7 days   Lab Units 07/17/19  1905   INR  1 05         Lab Results   Component Value Date/Time    HGBA1C 6 0 07/18/2019 05:06 AM     Results from last 7 days   Lab Units 07/21/19  1806 07/21/19  1804 07/17/19  1833   POC GLUCOSE mg/dl 122 37* 135     Results from last 7 days   Lab Units 07/21/19  1757 07/18/19  0506 07/18/19  0142 07/17/19  2219 07/17/19  1905   LACTIC ACID mmol/L  --   --  1 6 2 8* 3 3*   PROCALCITONIN ng/ml 0 21 0 21  --   --   --        * I Have Reviewed All Lab Data Listed Above  * Additional Pertinent Lab Tests Reviewed: All Labs Within Last 24 Hours Reviewed    Imaging:     XR chest portable   Final Result by Stephanie Johnson MD (07/22 1158)      Diminished left basilar opacity likely representing atelectasis or small effusion  Workstation performed: [id number]         XR chest portable   Final Result by Joe oSliz MD (07/21 8267)      Small left-sided pleural effusion  Workstation performed: [id number]         CT head wo contrast   Final Result by Karrie Nunez DO (07/20 1123)   1  Evolving right MCA territory infarction with petechial hemorrhage in the right basal ganglia, stable  2   Cerebral atrophy with chronic small vessel ischemic change  Workstation performed: [id number]         MRI pelvis bony wo and w contrast   Final Result by Titi Farah MD (07/19 1947)      1  Asymmetric hypertrophy and edema within the right gluteus jarrod, consider rhabdomyolysis   2  No fracture               Workstation performed: [id number]         XR chest portable   Final Result by Papo Butler DO (07/19 0940)      No acute cardiopulmonary disease  See comments  Workstation performed: [id number]         XR hips bilateral 3-4 vw w pelvis if performed   [facility] (07/18 1547)      Possible nondisplaced right superior pubic ramus fracture  Correlate for point tenderness  Workstation performed: [id number]         MRI brain wo contrast   Final Result by Dmitry Evangelista MD (07/18 1517)   Large acute right MCA territory infarction primarily involving the temporal lobe with lesser degree of involvement of the inferolateral frontal lobe and anterior parietal lobe  Consistent with CT/CTA      Development of acute microhemorrhage within the right lenticular nucleus and adjacent temporal lobe      Findings personally discussed by phone with Dr Gato Krishnan at 3:10 PM, 7/18/2019            Workstation performed: [id number]         XR chest 1 view portable   Final Result by Negro Holbrook MD (07/18 1136)      No acute cardiopulmonary disease  Workstation performed: [id number]         CTA stroke alert (head/neck)   Final Result by Brian Penaloza MD (07/17 1922)         1  Severe (70-90%) stenosis at the origin of the right internal carotid artery  Remainder of the vessel is diminutive likely secondary to proximal stenosis  2   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    Very minimal opacification of distal M3 branches suggesting poor collateral flow  I personally discussed this study with Nely Yoder on 2019 at 7:14 PM                            Workstation performed: [id number]         CT stroke alert brain   Final Result by Ramonia Apgar, MD (1922)         1  Acute to early subacute right MCA territory infarct involving greater than one third of the vascular territory  No evidence of hemorrhagic conversion or significant mass effect  2   Hyperdense right M1 and M2 branches suggesting acute thrombus  Findings were directly discussed with Nely Yoder on 2019 7:16 PM       Workstation performed: [id number]         VAS carotid complete study    (Results Pending)   FL barium swallow video w speech    (Results Pending)     Imaging Reports Reviewed by myself    Cultures:   Blood Culture:   Lab Results   Component Value Date    BLOODCX No Growth After 4 Days  2019    BLOODCX No Growth After 4 Days   2019     Urine Culture: No results found for: URINECX  Sputum Culture: No components found for: SPUTUMCX  Wound Culture: No results found for: WOUNDCULT    Last 24 Hours Medication List:     Current Facility-Administered Medications:  acetaminophen 325 mg Rectal Q4H PRN Norbert Nolasco MD    aspirin 300 mg Rectal Daily Arabella Lea PA-C    atorvastatin 40 mg Oral QPM WYATT Hunter    bisacodyl 10 mg Rectal Daily PRN Norbert Nolasco MD    dextrose 5 % and sodium chloride 0 9 % with KCl 20 mEq/L 75 mL/hr Intravenous Continuous Norbert Nolasco MD Last Rate: 75 mL/hr (19 0241)   digoxin 125 mcg Intravenous Daily Charlotte Pacheco MD    heparin (porcine) 300-2,000 Units/hr Intravenous Titrated Norbert Nolasco MD Last Rate: 1,050 Units/hr (19 2337)   HYDROmorphone 0 2 mg Intravenous Q4H PRN Norbert Nolasco MD    ipratropium 0 5 mg Nebulization Q6H Norbert Nolasco MD    levalbuterol 1 25 mg Nebulization Q6H Norbert Nolasco MD    And sodium chloride 3 mL Nebulization Q6H Brett Carter MD    metoprolol 2 5 mg Intravenous Q6H Delia Perkins DO    pneumococcal 13-valent conjugate vaccine 0 5 mL Intramuscular Prior to discharge WYATT Arango         Today, Patient Was Seen By: Minnie Simmons MD    ** Please Note: Dragon 360 Dictation voice to text software may have been used in the creation of this document   **

## 2019-07-23 NOTE — PHYSICAL THERAPY NOTE
PT TREATMENT     07/23/19 1210   Pain Assessment   Pain Assessment 0-10   Pain Score 9   Pain Location   (thigh)   Pain Orientation Right   Restrictions/Precautions   Other Precautions Cognitive; Bed Alarm; Chair Alarm  (left hemiparesis and left neglect)   General   Chart Reviewed Yes   Family/Caregiver Present No   Cognition   Arousal/Participation Responsive; Cooperative   Following Commands Follows one step commands with increased time or repetition   Subjective   Subjective Pt reports right thigh pain with RLE ex  Bed Mobility   Rolling R 2  Maximal assistance   Additional items Assist x 1;Verbal cues   Rolling L 2  Maximal assistance   Additional items Assist x 1;Verbal cues   Supine to Sit 2  Maximal assistance   Additional items Assist x 2;Verbal cues   Sit to Supine 2  Maximal assistance   Additional items Assist x 2;Verbal cues   Additional Comments Pt sat on EOB x 6 mins with max A of 1-2 skilled assist of PT/OT working on sitting balance and weight-bearing through LUE and cues for pt to look to left side due to neglect  Balance   Static Sitting Zero   Activity Tolerance   Activity Tolerance Patient limited by fatigue;Patient limited by pain  (weakness;left neglect)   Exercises   Heelslides AAROM; Right;10 reps; Supine  (P/AA LLE;trace hip flex)   Hip Abduction AAROM; Right;10 reps; Supine;PROM; Left   Hip Adduction AAROM; Right;10 reps; Supine  (P/AA LLE trace hip add)   Knee AROM Short Arc Quad AAROM; Right;10 reps; Supine   Knee AROM Long Arc Quad AAROM; Right;10 reps; Sitting   Ankle Pumps AROM; Right;10 reps; Supine;PROM; Left   Assessment   Assessment Pt more alert with increased participation with PT treatment today  With skilled assist of PT/OT pt able to transfer to EOB and work on sitting balance, Pt fatigues easily but demonstrates potential to improve functionally  Trace hip flexion LE noted today     Plan   Treatment/Interventions ADL retraining;Functional transfer training;LE strengthening/ROM; Therapeutic exercise; Endurance training;Cognitive reorientation;Patient/family training;Equipment eval/education; Bed mobility;Gait training; Compensatory technique education   PT Frequency Once a day   Recommendation   Recommendation Short-term skilled PT   Licensure   [place] License Number  [place], [place] [id number]

## 2019-07-23 NOTE — PROGRESS NOTES
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     · Anticipated outpatient CEA per vascular surgery  · Right carotid stenosis, complete vascular study ordered  · Continue w/ Heparin gtt and aspirin 300mg qd rectally  · Neurology consulted, see recommendation  · Speech and swallow therapy  · PT/OT followed by acute rehab  · VBSS is planned/scheduled for  at 9AM per speech and language therapy  · Deferred feeding tube and tracheostomy      3  Dyslipidemia     · Currently on Lipitor 40 mg, consider increasing to Lipitor 80 mg per Neuro recommendation      4  Right hip pain     · Seen by Ortho  No obvious pelvis fracture  Pain management  · MRI shows asymmetric hypertrophy and edema within the right gluteus jarrod, consider rhabdomyolysis      5  Traumatic rhabdomyolysis     · Patient was lying on the floor for prolonged period of time     · Total CK 3,324->850   Monitor electrolytes  Continue gentle IV hydration  EF around 50%        6  Respiratory distress   · Persistent tachpnea, s/p chest therapy and Lasix 20mg IV x1 -  · Continue w/ O2 via NC  · Patient was Bipap yesterday for a few hours in the afternoon, aspiration precaution  ABG ordered by primary team   · Currently NPO per speech therapy  · Repeat C-Xray ordered  · Xopenex PRN     7   History of acute kidney injury     · Serum creatinine now improving   Cr 1 21->0 95    Subjective / Objective: Patient seen examined at bedside, no acute overnight events reported  Patient is sitting comfortably, answering question appropriately  Patient denies chest pain, but repeat palpitation and shortness of breath  Patient remains NPO per speech and swallow recommendations  Vitals: Blood pressure 166/93, pulse 98, temperature (!) 97 3 °F (36 3 °C), temperature source Temporal, resp  rate (!) 28, height 5' 6" (1 676 m), weight 80 5 kg (177 lb 7 5 oz), SpO2 99 %  Vitals:    07/22/19 0400 07/23/19 0551   Weight: 80 3 kg (177 lb 0 5 oz) 80 5 kg (177 lb 7 5 oz)     Body mass index is 25 46 kg/m²  BP Readings from Last 3 Encounters:   07/23/19 166/93   12/18/18 120/70   02/13/18 130/80     Orthostatic Blood Pressures      Most Recent Value   Blood Pressure  166/93 filed at 07/23/2019 0708   Patient Position - Orthostatic VS  Lying filed at 07/23/2019 0708        I/O       07/21 0701 - 07/22 0700 07/22 0701 - 07/23 0700 07/23 0701 - 07/24 0700    P  O        I V  (mL/kg) 1455 4 (18 1) 2027 2 (25 2)     Total Intake(mL/kg) 1455 4 (18 1) 2027 2 (25 2)     Urine (mL/kg/hr) 875 (0 5) 2135 (1 1)     Total Output 875 2135     Net +580 4 -107 8                Invasive Devices     Peripheral Intravenous Line            Peripheral IV 07/20/19 Left Forearm 2 days    Peripheral IV 07/21/19 Right;Dorsal (posterior) Forearm 2 days          Drain            Urethral Catheter Non-latex 16 Fr  4 days                  Intake/Output Summary (Last 24 hours) at 7/23/2019 0756  Last data filed at 7/23/2019 0541  Gross per 24 hour   Intake 2027 24 ml   Output 2060 ml   Net -32 76 ml         Physical Exam:     Neurologic:  Alert & oriented x 3, right sided gaze, left sided weakness, difficulty swallowing secretion  Constitutional:  Well developed, well nourished, NAD  HENT:  AT/NC  Respiratory:  Decreased coarse breath sounds  Cardiovascular: S1-S2 regular, irregularly irregular   GI:  Soft, nondistended, hypoactive bowel sounds, nontender, surgical hernia noted  Musculoskeletal:  Atraumatic, no edema, normal dorsalis pedis pulse b/l  Psychiatric:  Speech and behavior appropriate        Medications/ Allergies:     Current Facility-Administered Medications:  acetaminophen 325 mg Rectal Q4H PRN William Rico MD    aspirin 300 mg Rectal Daily Jean Claude Sims PA-C    atorvastatin 40 mg Oral QPM WYATT Hunter    bisacodyl 10 mg Rectal Daily PRN William Rico MD    dextrose 5 % and sodium chloride 0 9 % with KCl 20 mEq/L 75 mL/hr Intravenous Continuous William Rico MD Last Rate: 75 mL/hr (07/23/19 0241)   digoxin 125 mcg Intravenous Daily Usha Wick MD    heparin (porcine) 300-2,000 Units/hr Intravenous Titrated William Rico MD Last Rate: 1,050 Units/hr (07/23/19 0414)   HYDROmorphone 0 2 mg Intravenous Q4H PRN William Rico MD    ipratropium 0 5 mg Nebulization Q6H William Rico MD    levalbuterol 1 25 mg Nebulization Q6H William Rico MD    And        sodium chloride 3 mL Nebulization Q6H William Rico MD    metoprolol 2 5 mg Intravenous Q6H Delia Perkins DO    pneumococcal 13-valent conjugate vaccine 0 5 mL Intramuscular Prior to discharge WYATT Eisenberg        acetaminophen 325 mg Q4H PRN   bisacodyl 10 mg Daily PRN   HYDROmorphone 0 2 mg Q4H PRN   pneumococcal 13-valent conjugate vaccine 0 5 mL Prior to discharge     Allergies   Allergen Reactions    Hydrocodone        VTE Pharmacologic Prophylaxis:   Heparin    Labs:   Troponins:  Results from last 7 days   Lab Units 07/23/19  0307 07/22/19  0631 07/21/19  0624   CK TOTAL U/L 850* 1,153* 2,071*   CK MB INDEX % <1 0 <1 0 <1 0       CBC with diff:  Results from last 7 days   Lab Units 07/23/19  0307 07/22/19  0631 07/21/19  0624 07/20/19  0430 07/19/19  0518 07/18/19  0506 07/17/19  1905   WBC Thousand/uL 9 65 8 31 9 80 8 20 10 38* 14 71* 16 18*   HEMOGLOBIN g/dL 10 8* 10 8* 11 2* 10 3* 10 2* 11 3* 12 3   HEMATOCRIT % 33 2* 33 7* 34 5* 31 8* 31 6* 35 3* 38 3   MCV fL 95 96 95 95 95 95 95   PLATELETS Thousands/uL 270 232 246 172 164 196 213   MCH pg 30 9 30 6 30 7 30 7 30 7 30 4 30 6   MCHC g/dL 32 5 32 0 32 5 32 4 32 3 32 0 32 1   RDW % 16 1* 16 3* 15 8* 16 2* 16 7* 16 6* 16 1*   MPV fL 9 2 9 6 9 6 9 6 10 1 9 3 9 5   NRBC AUTO /100 WBCs 0 0 0 0 0 0 0       CMP:  Results from last 7 days   Lab Units 07/23/19  0307 07/22/19  0631 07/21/19  0624 07/20/19  0430 07/19/19  0518 07/18/19  0506 07/17/19  1905   SODIUM mmol/L 142 142 139 144 144 143 138   POTASSIUM mmol/L 3 7 3 7 3 8 3 4* 3 7 3 8 4 7   CHLORIDE mmol/L 108 110* 107 110* 111* 108 104   CO2 mmol/L 24 22 19* 23 18* 25 24   ANION GAP mmol/L 10 10 13 11 15* 10 10   BUN mg/dL 19 20 19 17 19 19 17   CREATININE mg/dL 0 95 0 96 1 02 1 07 1 21 1 42* 1 55*   CALCIUM mg/dL 8 2* 8 7 8 9 7 9* 7 7* 7 9* 8 0*   AST U/L  --   --   --  74* 72*  --  78*   ALT U/L  --   --   --  35 24  --  20   ALK PHOS U/L  --   --   --  76 68  --  92   TOTAL PROTEIN g/dL  --   --   --  6 1* 6 4  --  6 7   ALBUMIN g/dL  --   --   --  2 7* 3 2*  --  3 0*   TOTAL BILIRUBIN mg/dL  --   --   --  0 90 0 80  --  0 40   EGFR ml/min/1 73sq m 74 73 68 64 55 46 41       Magnesium:  Results from last 7 days   Lab Units 07/23/19  0307 07/22/19  0631 07/21/19  0624 07/20/19  0430 07/19/19  0518 07/18/19  0506 07/17/19  1905   MAGNESIUM mg/dL 2 0 2 4 2 3 2 0 2 0 2 0 2 0     Coags:  Results from last 7 days   Lab Units 07/23/19  0307 07/22/19  1946 07/22/19  1234 07/22/19  0631 07/21/19  2346 07/21/19  1757 07/21/19  1102  07/17/19  1905   PTT seconds 40* 38* 34 36 37* 31 27   < > 25   INR   --   --   --   --   --   --   --   --  1 05    < > = values in this interval not displayed       TSH:  Results from last 7 days   Lab Units 07/17/19  1905   TSH 3RD GENERATON uIU/mL 2 007     Lipid Profile:  Results from last 7 days   Lab Units 07/18/19  0506   CHOLESTEROL mg/dL 204*   TRIGLYCERIDES mg/dL 83   HDL mg/dL 47   LDL CALC mg/dL 140* Hgb A1c:  Results from last 7 days   Lab Units 07/18/19  0506   HEMOGLOBIN A1C % 6 0     NT-proBNP: No results for input(s): NTBNP in the last 72 hours  Imaging & Testing   I have personally reviewed pertinent reports  Xr Chest Portable    Result Date: 7/22/2019  Narrative: CHEST INDICATION:   Follow-up, shortness of breath cough  COMPARISON:  7/20/2019  EXAM PERFORMED/VIEWS:  XR CHEST PORTABLE FINDINGS: Heart shadow appears unremarkable  Atherosclerotic vascular calcifications are noted  Diminished left basilar opacity compatible with atelectasis or small effusion  No pneumothorax  Osseous structures appear within normal limits for patient age  Impression: Diminished left basilar opacity likely representing atelectasis or small effusion  Workstation performed: [id number]     Xr Chest Portable    Result Date: 7/21/2019  Narrative: CHEST INDICATION:   Tachypnea  COMPARISON:  07/19/2019 EXAM PERFORMED/VIEWS:  XR CHEST PORTABLE 1 image FINDINGS: Cardiomediastinal silhouette appears enlarged  Pulmonary vessels are normal   The lungs are clear  No pneumothorax  Small left-sided pleural effusion  Osseous structures appear within normal limits for patient age  Impression: Small left-sided pleural effusion  Workstation performed: [id number]     Xr Chest Portable    Result Date: 7/19/2019  Narrative: CHEST INDICATION:   Shortness of breath  COMPARISON:  7/17/2019 EXAM PERFORMED/VIEWS:  XR CHEST PORTABLE Images: 2 (duplicate image re-windowed with line enhancement technique) FINDINGS: Cardiomediastinal silhouette appears stable  Calcified uncoiled thoracic aorta  Mild prominence of the ascending aortic contour which could represent tortuosity  CT imaging would be required to evaluate aortic size if clinically relevant  The lungs are grossly clear  Slight elevation of the right diaphragm  No pneumothorax or pleural effusion  Osseous structures appear within normal limits for patient age   Surgical clips right upper quadrant  Impression: No acute cardiopulmonary disease  See comments  Workstation performed: [id number]     Xr Chest 1 View Portable    Result Date: 7/18/2019  Narrative: CHEST INDICATION:   htn  Stroke COMPARISON:  None EXAM PERFORMED/VIEWS:  XR CHEST PORTABLE 1 image FINDINGS: Cardiomediastinal silhouette appears unremarkable  The lungs are clear  No pneumothorax or pleural effusion  Osseous structures appear within normal limits for patient age  Impression: No acute cardiopulmonary disease  Workstation performed: [id number]     Ct Head Wo Contrast    Result Date: 7/20/2019  Narrative: CT BRAIN - WITHOUT CONTRAST INDICATION:   Intracranial hemorrhage; Stroke; Follow-up prior to starting anticoagulation  COMPARISON:  MR brain July 18, 2019, CTA stroke alert July 17, 2019 and CT brain July 17, 2019  TECHNIQUE:  CT examination of the brain was performed  In addition to axial images, coronal 2D reformatted images were created and submitted for interpretation  Radiation dose length product (DLP) for this visit:  997 84 mGy-cm   This examination, like all CT scans performed in the [facility], was performed utilizing techniques to minimize radiation dose exposure, including the use of iterative  reconstruction and automated exposure control  IMAGE QUALITY:  Diagnostic  FINDINGS: PARENCHYMA:  Again identified is a hyperdense right middle cerebral artery  There is an associated right MCA territory infarction  Increased density in the right basal ganglia, most compatible with petechial hemorrhage  There is mild mass effect with sulcal effacement  Midline shift of approximately 3 mm to the left  This has not significantly changed  Compression of the right lateral ventricle  Decreased attenuation in the periventricular regions and centrum semiovale bilaterally, consistent with chronic small vessel ischemic change    Prominence of the extra-axial space adjacent to the anterior left temporal lobe, most compatible with arachnoid cyst  VENTRICLES AND EXTRA-AXIAL SPACES:  Stable mild mass effect from right MCA territory infarction with sulcal effacement and compression of the right lateral ventricle  Midline shift of approximately 3 mm to the left, stable  Otherwise, the ventricular system is moderately dilated  VISUALIZED ORBITS AND PARANASAL SINUSES:  No acute abnormality involving the orbits  Mild scattered sinus mucosal thickening is noted  No fluid levels are seen  CALVARIUM AND EXTRACRANIAL SOFT TISSUES:  Calvarium intact  Incidental note is made of ununited posterior arch of C1  Impression: 1  Evolving right MCA territory infarction with petechial hemorrhage in the right basal ganglia, stable  2   Cerebral atrophy with chronic small vessel ischemic change  Workstation performed: [id number]     Mri Brain Wo Contrast    Result Date: 7/18/2019  Narrative: MRI BRAIN WITHOUT CONTRAST INDICATION: right MCA infarct  COMPARISON:   7/17/2019 CT/CTA TECHNIQUE:  Sagittal T1, axial T2, axial FLAIR, axial T1, axial [place] and axial diffusion imaging  IMAGE QUALITY:  Diagnostic  FINDINGS: BRAIN PARENCHYMA: A large region of acute to subacute infarction involves the principally the perisylvian portion of the right temporal lobe with less pronounced involvement of the adjacent inferior right frontal and anterior right parietal lobes  There is contiguous acute involvement of the head of the right caudate nucleus and corona radiata  Mild edema and mass effect with mild right lateral ventricular compression  Findings are consistent with CT  No additional acute ischemia identified  Mild microangiopathic  ischemic changes involve deep white matter elsewhere  Development of region of acute hemorrhage measuring approximately 1 cm x 1 5 cm acute microhemorrhage within the right lenticular nucleus and to lesser degree adjacent temporal lobe  There is no discrete mass, or midline shift  VENTRICLES:  The ventricles are normal in size and contour  SELLA AND PITUITARY GLAND:  Normal  ORBITS:  Normal  PARANASAL SINUSES:  Normal  VASCULATURE: Poorly visualized right middle cerebral artery CALVARIUM AND SKULL BASE:  Normal  EXTRACRANIAL SOFT TISSUES:  Normal      Impression: Large acute right MCA territory infarction primarily involving the temporal lobe with lesser degree of involvement of the inferolateral frontal lobe and anterior parietal lobe  Consistent with CT/CTA Development of acute microhemorrhage within the right lenticular nucleus and adjacent temporal lobe Findings personally discussed by phone with Dr Bertha Cantu at 3:10 PM, 7/18/2019 Workstation performed: [id number]     Ct Stroke Alert Brain    Result Date: 7/17/2019  Narrative: CT BRAIN - STROKE ALERT PROTOCOL INDICATION:   Acute stroke  COMPARISON:  None  TECHNIQUE:  CT examination of the brain was performed  In addition to axial images, coronal reformatted images were created and submitted for interpretation  Radiation dose length product (DLP) for this visit:  1017 2 mGy-cm   This examination, like all CT scans performed in the [facility], was performed utilizing techniques to minimize radiation dose exposure, including the use of iterative  reconstruction and automated exposure control  IMAGE QUALITY:  Diagnostic  FINDINGS:  PARENCHYMA:  Cortical and subcortical hypoattenuation with sulcal effacement in the right frontoparietal temporal region, also involving the posterior insula, right basal ganglia and external capsule  No evidence of intracranial hemorrhage  No midline shift  Periventricular and subcortical hypoattenuating foci consistent with microangiopathic disease  Increased density in right M1 and proximal M2 branches likely represent thrombus  VENTRICLES AND EXTRA-AXIAL SPACES:  No hydrocephalus or extra-axial collection  VISUALIZED ORBITS AND PARANASAL SINUSES:  Intact globes and orbits    Chronic paranasal sinus disease  CALVARIUM AND EXTRACRANIAL SOFT TISSUES:  No lytic or blastic lesion or soft tissue mass  Impression: 1  Acute to early subacute right MCA territory infarct involving greater than one third of the vascular territory  No evidence of hemorrhagic conversion or significant mass effect  2   Hyperdense right M1 and M2 branches suggesting acute thrombus  Findings were directly discussed with Moody Ordonez on 7/17/2019 7:16 PM  Workstation performed: [id number]     Vas Carotid Complete Study    Result Date: 7/22/2019  Narrative:  [facility] REPORT CLINICAL: Indications: Patient presents with to evaluate for carotid artery stenosis s/p recent CVA  The patient was found unresponsive  This study was performed in ICU  Risk Factors: The patient has a history of colon cancer and former smoker  Clinical Right Pressure:  163/77 mm Hg, Left Pressure:  / mm Hg  FINDINGS:  Right        Impression  PSV  EDV (cm/s)  Ratio  Dist  ICA                 42          13   0 88  Mid  ICA                  70          20   1 46  Prox  ICA    70 - 99%    303         144   6 31  Dist CCA                  52          13         Mid CCA                   48           8   0 86  Prox CCA                  56           9         Ext Carotid              135          22   2 81  Prox Vert                 49          14         Subclavian                81           2          Left         Impression  PSV  EDV (cm/s)  Ratio  Dist  ICA                 45          13   0 49  Mid  ICA                  60          24   0 66  Prox   ICA    50 - 69%    173          64   1 90  Dist CCA                  84          31         Mid CCA                   91          24   1 02  Prox CCA                  89          25         Ext Carotid              115          31   1 26  Prox Vert                 45          14         Subclavian                76           9            CONCLUSION: RIGHT: There is 70-99% stenosis noted in the internal carotid artery  Plaque is a mixture of heterogenous/homogenous and irregular/smooth  Vertebral artery flow is antegrade  There is no significant subclavian artery disease  LEFT: There is 50-69% stenosis noted in the internal carotid artery  Plaque is heterogenous and irregular  Vertebral artery flow is antegrade  There is no significant subclavian artery disease  No prior exam for comparison  Internal carotid artery stenosis determination by consensus criteria from: Alyssa Reveles et al  Carotid Artery Stenosis: Gray-Scale and Doppler US Diagnosis - Society of Radiologists in [address] [place] [address] [facility], Radiology 2003; [phone]  Mri Pelvis Bony Wo And W Contrast    Result Date: 7/19/2019  Narrative: MRI BONY PELVIS WITH AND WITHOUT CONTRAST INDICATION:   Rigth hip pain, r/o non discplaced fracture  Stroke COMPARISON: X-ray from prior day TECHNIQUE:  MR sequences were obtained of the pelvis including: Precontrast: Localizer, Coronal STIR or coronal T2 fat sat, coronal T1, axial T1, axial T2 fat sat, sagittal T2 fat sat, Sagittal T1, axial LAVA T1 fat sat  Post contrast: axial LAVA T1 fat sat, coronal LAVA T1 fat sat  IV Contrast:  8 mL of Gadobutrol injection (SINGLE-DOSE) FINDINGS: RIGHT HIP: -JOINT EFFUSION: None  -BONE MARROW SIGNAL AND ALIGNMENT: Normal marrow signal demonstrated without hip fracture or AVN  -TROCHANTERIC BURSA: Contains fluid -MUSCLES AND TENDONS:  There is diffuse asymmetric hypertrophy of the right gluteus jarrod, with T2 bright signal   Relative hypoperfusion is seen near the origin  No enhancing mass or collection LEFT HIP: -JOINT EFFUSION: None  -BONE MARROW SIGNAL AND ALIGNMENT: Normal marrow signal demonstrated without hip fracture or AVN   -TROCHANTERIC BURSA: Normal  -MUSCLES AND TENDONS:  Intramuscular edema is seen within the proximal aspect of the vastus musculature proximally, and within the gluteus minimus BONY PELVIS: -SI JOINTS AND SYMPHYSIS PUBIS: Intact  -VISUALIZED LUMBAR SPINE:  Unremarkable  -OVERALL MARROW SIGNAL:  Normal, without suspicious focal lesions  -MUSCULATURE:  As above -PELVIC SOFT TISSUES:   Additionally there is subcutaneous edema within the soft tissues surrounding the right hip SUBCUTANEOUS TISSUES: Normal     Impression: 1  Asymmetric hypertrophy and edema within the right gluteus jarrod, consider rhabdomyolysis 2  No fracture Workstation performed: [id number]     Xr Hips Bilateral 3-4 Vw W Pelvis If Performed    Result Date: 7/18/2019  Narrative: BILATERAL HIPS AND PELVIS INDICATION:   fall  COMPARISON:  None VIEWS:  XR HIPS BILATERAL 3-4 VW W PELVIS IF PERFORMED  FINDINGS: Possible nondisplaced right superior pubic ramus fracture  Correlate for point tenderness  Visualized lower lumbar spine is unremarkable  LEFT HIP: No significant hip degenerative changes  Joint space alignment is maintained  Soft tissues are unremarkable  RIGHT HIP: No significant hip degenerative changes  Joint space alignment is maintained  Soft tissues are unremarkable  Impression: Possible nondisplaced right superior pubic ramus fracture  Correlate for point tenderness  Workstation performed: [id number]     Cta Stroke Alert (head/neck)    Result Date: 7/17/2019  Narrative: CTA NECK AND BRAIN WITH AND WITHOUT CONTRAST INDICATION: Acute stroke  COMPARISON:   7/17/2019  TECHNIQUE:  Post contrast imaging was performed after administration of iodinated contrast through the neck and brain  Post contrast axial 0 625 mm images timed to opacify the arterial system  3D rendering was performed on an independent workstation  MIP reconstructions performed  Coronal reconstructions were performed of the noncontrast portion of the brain  Radiation dose length product (DLP) for this visit:  353 18 mGy-cm     This examination, like all CT scans performed in the [facility], was performed utilizing techniques to minimize radiation dose exposure, including the use of iterative  reconstruction and automated exposure control  IV Contrast:  85 mL of iohexol (OMNIPAQUE)  IMAGE QUALITY:   Motion artifact results in suboptimal evaluation FINDINGS: CERVICAL VASCULATURE AORTIC ARCH AND GREAT VESSELS:  Three-vessel configuration aortic arch  Great vessel origins not well evaluated due to motion artifact  No significant stenosis in the subclavian arteries  RIGHT VERTEBRAL ARTERY CERVICAL SEGMENT:  Normal origin  The vessel is normal in caliber throughout the neck  LEFT VERTEBRAL ARTERY CERVICAL SEGMENT:  Normal origin  The vessel is normal in caliber throughout the neck  RIGHT EXTRACRANIAL CAROTID SEGMENT:  Normal caliber common carotid artery  Calcified plaque at the bifurcation and ICA origin  Severe (70-90%) stenosis at the ICA origin  Decreased caliber of the vessel throughout the cervical segment with intimal thickening  LEFT EXTRACRANIAL CAROTID SEGMENT:  Normal caliber common carotid artery  Calcified plaque the bifurcation and ICA origin resulting in mild to moderate (50-60%) stenosis  NASCET criteria was used to determine the degree of internal carotid artery diameter stenosis  INTRACRANIAL VASCULATURE INTERNAL CAROTID ARTERIES:  Diminutive right carotid siphon likely secondary to severe proximal stenosis  Calcified plaque throughout the left carotid siphon without significant stenosis  Patent ophthalmic arteries  ANTERIOR CIRCULATION:  Absent or severely hypoplastic right A1 segment  Anterior to indicating artery visualized  No stenosis in the anterior cerebral arteries  MIDDLE CEREBRAL ARTERY CIRCULATION: Absence of opacification of the proximal right middle cerebral artery  Minimal enhancement of distal M3 branches suggesting poor collateral flow  No stenosis in the proximal right middle cerebral artery  DISTAL VERTEBRAL ARTERIES:  Normal distal vertebral arteries    Posterior inferior cerebellar artery origins are normal  Normal vertebral basilar junction  BASILAR ARTERY:  Basilar artery is normal in caliber  Normal superior cerebellar arteries  POSTERIOR CEREBRAL ARTERIES: Both posterior cerebral arteries arises from the basilar tip  Both arteries demonstrate normal enhancement  Tiny bilateral posterior communicating arteries  DURAL VENOUS SINUSES:  Limited evaluation without evidence of or thrombosis  NON VASCULAR ANATOMY BONY STRUCTURES:  No acute osseous abnormality  SOFT TISSUES OF THE NECK:  Normal  THORACIC INLET:  Unremarkable  Impression: 1  Severe (70-90%) stenosis at the origin of the right internal carotid artery  Remainder of the vessel is diminutive likely secondary to proximal stenosis  2   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  Very minimal opacification of distal M3 branches suggesting poor collateral flow  I personally discussed this study with Moody Ordonez on 2019 at 7:14 PM  Workstation performed: [id number]        EKG / Monitor: Personally reviewed      Afib     Cardiac testing:   Results for orders placed during the hospital encounter of 19   Echo complete with contrast if indicated    [address]  [address] [address]  [phone]    Transthoracic Echocardiogram  2D, M-mode, Doppler, and Color Doppler    Study date:  2019    Patient: Capri Garzon  MR number: [id number]  Account number: [de-identified]  : 1935  Age: 81 years  Gender: Male  Status: Inpatient  Location: Bedside  Height: 66 in 66 in  Weight: 177 5 lb 177 5 lb  BP: 92/ 54 mmHg    Indications: Cerebral Thrombosis    Diagnoses: I67 9 - Cerebrovascular disease, unspecified    Sonographer:  ABI Man  Primary Physician:  Brandi Howard MD  Referring Physician:  WYATT Dillard  Group:  [facility]  Interpreting Physician:  Cy Love MD    SUMMARY    PROCEDURE INFORMATION:  This was a technically difficult study   Echocardiographic views were limited due to restricted patient mobility, poor patient compliance, poor acoustic window availability, and lung interference  LEFT VENTRICLE:  Ejection fraction was estimated to be around 50 %  Although no diagnostic regional wall motion abnormality was identified, this possibility cannot be completely excluded on the basis of this study  Wall thickness was mildly increased  There was mild concentric hypertrophy  Doppler parameters were consistent with abnormal left ventricular relaxation (grade 1 diastolic dysfunction)  ATRIAL SEPTUM:  No Large ASD or large PFO identified by buuble study  Buuble study over all limited  MITRAL VALVE:  There was mild regurgitation  AORTIC VALVE:  Transaortic velocity was increased due to valvular stenosis  There was mild to moderate stenosis  peak velocity 2 33 m/se, Peak gradient 23, mean 11 mm of Hg and DENZEL around 1 35 to 1 4 cm2  TRICUSPID VALVE:  There was mild regurgitation  Estimated peak PA pressure was 35 mmHg  HISTORY: PRIOR HISTORY: Neuralgia, Colon Cancer    PROCEDURE: The procedure was performed at the bedside  This was a routine study  The transthoracic approach was used  The study included complete 2D imaging, M-mode, complete spectral Doppler, and color Doppler  The heart rate was 64 bpm,  at the start of the study  Intravenous contrast (agitated saline, 10 ml) was administered to evaluate shunting  Intravenous contrast ( 10 ml) was administered  Echocardiographic views were limited due to restricted patient mobility, poor  patient compliance, poor acoustic window availability, and lung interference  This was a technically difficult study  LEFT VENTRICLE: Size was normal  Ejection fraction was estimated to be around 50 %  Although no diagnostic regional wall motion abnormality was identified, this possibility cannot be completely excluded on the basis of this study  Wall  thickness was mildly increased  There was mild concentric hypertrophy  DOPPLER: Doppler parameters were consistent with abnormal left ventricular relaxation (grade 1 diastolic dysfunction)  RIGHT VENTRICLE: The size was normal  Systolic function was normal     LEFT ATRIUM: Size was normal     ATRIAL SEPTUM: No Large ASD or large PFO identified by buuble study  Buuble study over all limited  RIGHT ATRIUM: Size was normal     MITRAL VALVE: There was normal leaflet separation  DOPPLER: The transmitral velocity was within the normal range  There was no evidence for stenosis  There was mild regurgitation  AORTIC VALVE: The valve was probably trileaflet  Leaflets exhibited mildly increased thickness, mild calcification, and normal cuspal separation  DOPPLER: Transaortic velocity was increased due to valvular stenosis  There was mild to  moderate stenosis  peak velocity 2 33 m/se, Peak gradient 23, mean 11 mm of Hg and DENZEL around 1 35 to 1 4 cm2  There was no regurgitation  TRICUSPID VALVE: DOPPLER: There was mild regurgitation  Estimated peak PA pressure was 35 mmHg  PULMONIC VALVE: DOPPLER: There was no significant regurgitation  PERICARDIUM: There was no thickening or calcification  There was no pericardial effusion  AORTA: The root exhibited normal size  SYSTEMIC VEINS: IVC: The inferior vena cava was not well visualized      SYSTEM MEASUREMENT TABLES    2D mode  AoR Diam 2D: 3 6 cm  LA Diam (2D): 4 3 cm  LA/Ao (2D): 1 19  FS (2D Teich): 22 4 %  IVSd (2D): 1 24 cm  LVDEV: 124 cmï¾³  LVESV: 68 3 cmï¾³  LVIDd(2D): 5 1 cm  LVISd (2D): 3 96 cm  LVOT Area 2D: 3 14 cmï¾²  LVPWd (2D): 1 19 cm  SV (Teich): 55 7 cmï¾³    Apical four chamber  LVEF A4C: 45 %    Unspecified Scan Mode  DENZEL Cont Eq (Peak Patrick): 1 23 cmï¾²  DENZEL Cont Eq (VTI): 1 35 cmï¾²  LVOT (VTI): 18 8 cm  LVOT Diam : 2 cm  LVOT Vmax: 928 mm/s  LVOT Vmax; Mean: 902 mm/s  Peak Grad ; Mean: 3 mm[Hg]  SV (LVOT): 62 cmï¾³  VTI;Mean: 2 mm[Hg]  MV Peak A Patrick: 607 mm/s  MV Peak E Patrick  Mean: 408 mm/s  MVA (PHT): 3 67 cmï¾²  PHT: 60 ms  Max P mm[Hg]  V Max: 2780 mm/s  Vmax: 2780 mm/s  RA Area: 19 8 cmï¾²  RA Volume: 58 5 cmï¾³  TAPSE: 2 1 cm    [employer] Accredited [facility]    Prepared and electronically signed by    Chelsi Taylor MD  Signed 2019 11:00:17             Virgie Calix DO      "This note has been constructed using a voice recognition system  Therefore there may be syntax, spelling, and/or grammatical errors   Please call if you have any questions  "

## 2019-07-23 NOTE — PLAN OF CARE
Problem: PHYSICAL THERAPY ADULT  Goal: Performs mobility at highest level of function for planned discharge setting  See evaluation for individualized goals  Outcome: Progressing  Note:   Prognosis: Good  Problem List: Decreased strength, Decreased endurance, Decreased range of motion, Impaired balance, Decreased mobility, Decreased cognition, Decreased safety awareness, Impaired judgement, Decreased coordination, Pain  Assessment: Pt more alert with increased participation with PT treatment today  With skilled assist of PT/OT pt able to transfer to EOB and work on sitting balance, Pt fatigues easily but demonstrates potential to improve functionally  Trace hip flexion LE noted today  Recommendation: Short-term skilled PT          See flowsheet documentation for full assessment

## 2019-07-23 NOTE — OCCUPATIONAL THERAPY NOTE
OT TREATMENT       07/23/19 1204   Restrictions/Precautions   Other Precautions Cognitive; Chair Alarm; Bed Alarm;O2;Fall Risk;Pain  (L hemipareisis and L neglect)   General   Family/Caregiver Present No, family member enterred at end of session  Pain Assessment   Pain Assessment No/denies pain   Pain Score No Pain  (At rest, increases with LE movement)   Bed Mobility   Rolling R 2  Maximal assistance   Additional items Assist x 1;Verbal cues;LE management   Rolling L 2  Maximal assistance   Additional items Assist x 1;Verbal cues;LE management   Supine to Sit 2  Maximal assistance   Additional items Assist x 2;Verbal cues;LE management  (LUE management)   Sit to Supine 2  Maximal assistance   Additional items Assist x 2;LE management;Verbal cues  (LUE management)   Additional Comments Pt sat EOB for 6 minutes during session max assist of 2 due to significant patient weakness and assistance required  Pt's L palm placed on bed to encouarge weight bearing through LUE with verbal cues to look to L side  Pt requiring max a to shift weight to R, collapsing to L without support  ROM - Left Upper Extremities    L Shoulder AAROM; Flexion   L Elbow AAROM;Elbow flexion;Elbow extension   L Hand AAROM; Thumb; Index finger; Long finger;Ring finger;Little finger;PROM  (Full flexion all fingers and opposition of thumb )   L Position Supine  (Head of bed raised )   L Weight/Reps/Sets 5 times passive stretch, 5 times AAROM    LUE ROM Comment Muscle tapping to encourage activation of biceps throughout activity  Able to initiate shoulder and elbow flexion  Pt demonstrating increased muscle activation however unable to move arm through ROM  Neuromuscular Education   Taping Tapping/rubbing L bicep ,demonstring muscle activation with attepted shoulder and elbow flexion  Head/Neck Control Scanning/tracking to L side with verbal and visual cues   Initially required increased time to turn head to L side with decreased time necessary during 2nd and 3rd trial  Pt able to locate target on L side and hold gaze for approximately 10 minute before gaze drifting to R side  Cognition   Arousal/Participation Alert; Responsive; Cooperative   Following Commands Follows one step commands with increased time or repetition   Activity Tolerance   Activity Tolerance Patient tolerated treatment well   Medical Staff Made Aware Yes, nurse Suzette Lewis approved of treatment and aware session ended  Assessment   Assessment Pt willing to participate in therapy and tolerated session well  Pt continues to make slow, steady progress towards functional goals and will continue to benefit from skilled occupational therapy services to increase independence and safety with ADLs and functional mobility  Plan   Treatment Interventions Functional transfer training;UE strengthening/ROM; Endurance training;Patient/family training;Neuromuscular reeducation; Activityengagement   Recommendation   OT Discharge Recommendation Short Term Rehab   Modified Crisp Scale   Modified Criselda Scale 5   Licensure   [place] License Number  Mary Martel, OTR/L [id number]

## 2019-07-23 NOTE — PROCEDURES
Video Swallow Study      Patient Name: Pastora Molina Date: 7/23/2019        Past Medical History  Past Medical History:   Diagnosis Date    Malignant neoplasm of descending colon ([address] ) 05/12/2006    Neuralgia     Last Assessed:6/25/13        Past Surgical History  No past surgical history on file  General Information:  Ordering Physician: Dr Verena Hernandez  Radiologist: Dr Pérez  Date of Order: 07/22/19  Date of Evaluation: 07/23/19  Type of Study: Initial MBS  Diet Prior to this Study: NPO  Additional History: Patient has been NPO since admission  MBS completed to determine safety of PO intake, determine safest solid and liquid consistencies as well as recommended compensatory strategies  Patient has had delayed cough following the swallow on thickened liquid and puree consistencies during trials at the bedside  Limitations: Patient is lethargic, limited view of pharynx and subglottic space, limited view of cricopharygeus and UES, dentures not in place  Patient required support to keep head in midline  Head is turned to the right without support  Positioning: Patient was seated upright in videofluoroscopy chair at a 90 degree angle  Exam completed in the lateral view  View of oral cavity and lower portion of pharynx not ideal due to patient's size and left sided weakness  Materials Adminstered: Thin liquid, nectar thickened liquid, honey thickened liquid, puree  Exam discontinued following puree consistency  Soft solid, mixed solid, regular consistency solid were not assessed  Oral Stage: Moderately impaired with delay and incoordination in oral preparation of liquids and puree  Lingual residual following initial swallow of thin liquid with patient cued to initiate a swallow to clear oral cavity  Mild residual in oral cavity following each swallow      Pharyngeal Stage:  Aspiration during the swallow on one of three tsp  amounts of thin liquid, reduced control of nectar thickened liquid bolus with material reaching the levels of the valleculae and pyriform sinuses before the swallow was initiated  No aspiration or pharyngeal residual following the swallow on honey thickened liquid or puree solid  Aspiration Response: Coughing following aspiration during the swallow on one of three tsp  amounts of thin liquid  Patient coughed during the exam following the swallow on additional consistencies in the absence of aspiration  This indicates difficulty managing phlegm and secretions  Swallow Mechanics  Swallowing Initiation[de-identified] Moderately delayed  Hyolaryngeal Excursion: Mildly limited  Epiglottic Inversion: Present  Tongue Drive: Moderately weak  Pharyngeal Constriction: Adequate  Cricopharyngeal Opening/Relaxation: Mildly impaired  Cricopharyngeal Prominence/Bar: Not present  Cervical Osteophytes: Not present  Zenkers Diverticulum: Not present  Esophageal Stage:  Limited view of pyriform sinuses, cricopharyngeus, upper esophageal segment, however, it did not appear that there was significant residual following the swallow in these areas  Assessment Summary:  Swallow skills are adequate to begin a puree diet with honey thickened liquid  Patient is at risk of not meeting nutritional needs through PO intake  Patient had reduced tolerance of PO intake as study progressed  There was frequent and sustained coughing during the exam in the absence of aspiration  Patient required frequent breaks as the study progressed  He did not accept more than two 1/2 tsp  amounts of puree solid  Cardiac, respiratory compromise, difficulty managing secretions are factors that will impact ability to tolerate adequate intake of liquids and puree  Diagnosis/Prognosis:  Dysphagia Assessment: Moderate oral and pharyngeal dysphagia with aspiration during the swallow on thin liquid and risk for aspiration on nectar thickened liquid    Patient may have difficulty meeting nutrition and hydration needs on PO intake only  Prognosis for Safe Diet Advancement: Fair  Barriers to Reach Goals: Respiratory compromise, difficulty managing secretions, reduced tolerance for multiple trials during exam, consistent and prolonged coughing during exam in absence of aspiration  Results/Reviewed with Recommendations: Patient, Family member, RN, MD     Recommendations:  Treatment for dysphagia  Primary Recommendations: Oral feeding  Diet Texture: Dysphagia 1 Pureed  Liquid Consistency: Honey thick  Liquid Administration: Teaspoon for liquid and puree  Medication Administration: Medication crushed and in puree  Suggested Postioning: Upright/ 90 degrees, head midline with support  Suggested Precautions: Aspiration precautions, feed when alert and in fully upright position, pillow behind head with support to maintain head in midline, small bites/sips, slow rate, remain upright 45 degrees after meals  Do not have patient looking to right  Position head as close to midline as possible  Discontinue PO intake at patient's request   Strategies: Small bites/sips, alternate food/liquid, coordinate breathing/swallowing  Dysphagia Treatment Recommendations: Yes, By VBS results  Further Evaluation by: Dietician to monitor PO intake and nutrition/hydration needs  Consider Follow-up VBS: When overall medical condition improves and when patient/ family request a significant diet upgrade      ELENA Blanco S , [address] Pathologist  [id number]

## 2019-07-24 NOTE — ASSESSMENT & PLAN NOTE
Patient was found lying in the floor for prolonged preop  CPK 5381 on admission, peaked at 6896 and now downtrending  MRI revealed asymmetric hypertrophy and edema within the right gluteus jarrod, likely cause of rhabdomyolysis  · Continue to monitor  · Stopped IVFs, now on water flushes with TFs  · Once CK nml can increase lipitor dose

## 2019-07-24 NOTE — ASSESSMENT & PLAN NOTE
Patient reported pain on the right groin    Related to patient's fall  X-ray revealed possibility of nondisplaced right superior pubic ramus fracture  Orthopedic input appreciated, recommended MRI of the right pelvis/hip  MRI-showed no evidence of fracture but revealed Asymmetric hypertrophy and edema within the right gluteus jarrod  PT OT as tolerated  Pain seems to have improved

## 2019-07-24 NOTE — ASSESSMENT & PLAN NOTE
Multifactorial secondary to muscular weakness, difficulty in clearing secretion, atelectasis,  volume overload and now possible PNA  Initial Chest x-ray with small left-sided effusion without any acute findings  Remains with significant secretion requiring frequent suctioning  Repeat CXR NAD, however clinically patient appears to have tenuous respiratory status, with increased secretions, weak cough, and sepsis   Likely PNA  · Percussion vest and chest PT  · Continue BiPAP as needed  · Continue aspiration precaution  · Monitor intake and output  · IV Lasix as needed to maintain euvolemia

## 2019-07-24 NOTE — ASSESSMENT & PLAN NOTE
Noted to be in new onset rapid AFib with associated tachypnea on 07/19  unclear if contributary or subsequent to CVA  Heart rate improved with Cardizem drip (7/19-7/22)  Status post digoxin loading on 07/19  · Cardiology consult appreciated  · Started on IV heparin, change to PO AC once can take pills safely  · Continue metoprolol, increase to 5mg q6hr for better HR control  · Continue IV digoxin until can take pills safely  We dont have liquid digoxin on formulary   If cannot take digoxin via NG/PEG then will need another medication options

## 2019-07-24 NOTE — ASSESSMENT & PLAN NOTE
Bladder scan noted to have more than 800 cc of urinary retention  Setting of CVA, ELOISE, rhabdomyolysis, pelvic fracture    Cohen catheter was placed  Voiding trial when patient more mobile

## 2019-07-24 NOTE — PHYSICAL THERAPY NOTE
PT TREATMENT     07/24/19 1141   Pain Assessment   Pain Assessment 0-10   Pain Score Worst Possible Pain   Pain Type Acute pain   Pain Location Buttocks   Pain Orientation Right   Restrictions/Precautions   Other Precautions Fall Risk;Bed Alarm; Chair Alarm  (left hemiparesis;left neglect)   General   Chart Reviewed Yes   Family/Caregiver Present No   Subjective   Subjective "Pain in my right a** cheek"   Bed Mobility   Rolling R 2  Maximal assistance   Additional items Assist x 1;Verbal cues  (x 5 reps)   Rolling L 2  Maximal assistance   Additional items Assist x 1;Verbal cues  (x 5 reps)   Activity Tolerance   Activity Tolerance Patient limited by fatigue;Patient limited by pain  (and weakness)   Exercises   Heelslides PROM;AAROM; Left;10 reps; Supine   Hip Abduction PROM; Left;10 reps; Supine   Hip Extension   (Prom hip ER;P/AA hip IR;10 reps;supine;LLE)   Hip Adduction PROM;AAROM; Left;10 reps; Supine   Ankle Pumps PROM; Left;10 reps; Supine   Assessment   Problem List Decreased strength;Decreased range of motion;Decreased endurance; Impaired balance;Decreased mobility; Decreased coordination;Decreased cognition; Impaired judgement;Decreased safety awareness; Impaired tone;Pain   Assessment Pt with good participation with rolling  Pt reports pain with LLE with p/aarom  Pt will cont to benefit from skilled PT services  Plan   Treatment/Interventions ADL retraining;Functional transfer training;LE strengthening/ROM; Therapeutic exercise; Endurance training;Patient/family training;Cognitive reorientation;Equipment eval/education; Bed mobility;Gait training; Compensatory technique education   PT Frequency Once a day   Recommendation   Recommendation Short-term skilled PT   Licensure   [place] License Number  [address] [place] [address] [id number]

## 2019-07-24 NOTE — PROGRESS NOTES
Progress Note - Cardiology     Kristian Graft Sr 81 y o  male MRN: [id number]  : 1935  Unit/Bed#: [address] [place] [address] Encounter: [id number]    Assessment and Plan:     1  Atrial fibrillation with rapid ventricular rate     · New onset suspected  Possible history of paroxysmal atrial fibrillation  (Infrequent medical care )  · Current HR: 88 improved from 114  · Rate controlled w/ Digoxin 125 mcg qd and Lopressor 2 5 mg IV q 6 hours  · Goal heart rate <167 keep systolic -9  · Consider switch Lopressor to p o  once able unsure more linear heart rate control  · Cardizem gtt D/C-ed (-) 2/2 hypotension (113/81) and permissive htn recommendations  2   SIRS  · HR max this mornin, RR: 22, WBC: 14 63   · New onset leukocytosis noted this morning 15 51->14 63  · Afebrile  · Unknown source of infection at the moment  · This morning C-Xray showed no acute cardiopulmonary disease  · UA on  neg nitrate and leukocytes, WBC 1-2, large blood and moderate bacteria  · Blood culture: no growth after 5d  · PEG tube placement deferred by GI  · Monitor closely    3  CVA  · 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     · Anticipated outpatient CEA per vascular surgery  · Right carotid stenosis, complete vascular study ordered  · Continue w/ Heparin gtt and aspirin 300mg qd rectally  · Neurology consulted, see recommendation  · FL barium video study done on : Speech and swallow therapy recommended advancing diet to pureed, but patient did not tolerate well per chart review  The C remains NPO  · PEG tube indicated, patient agrees but had to be deferred in light of SIRS  · PT/OT followed by acute rehab  · Deferred feeding tube and tracheostomy      4  Dyslipidemia      · Currently on Lipitor 40 mg, consider increasing to Lipitor 80 mg per Neuro recommendation      5  Right hip pain     · Seen by Ortho  No obvious pelvis fracture   Continue current pain management     · MRI shows asymmetric hypertrophy and edema within the right gluteus jarrod, consider rhabdomyolysis      6  Traumatic rhabdomyolysis     · Patient was lying on the floor for prolonged period of time     · Total CK 3,324->850   Monitor electrolytes  Continue gentle IV hydration  EF around 50%        7  Respiratory distress   · Persistent tachpnea, s/p chest therapy and Lasix 20mg IV x1 7/19-22  · Continue w/ O2 via NC  · Continue BiPAP 12/5 q h s  · ABG ordered by primary team   · Currently NPO per speech therapy  · Xopenex PRN     8   History of acute kidney injury     · Serum creatinine now improving  Cr 1 21->0 95->0 96    Subjective / Objective:     Patient seen and examined a bedside  He is somnolent but easily arousable  Patient answers questions appropriately  He denies CP, SOB and palpitation  He endorses feeling more tired than usual, no other complains  Patient remains NPO because he was not able to tolerate pureed diet  Vitals: Blood pressure 151/81, pulse (!) 114, temperature 100 1 °F (37 8 °C), temperature source Oral, resp  rate 22, height 5' 6" (1 676 m), weight 80 5 kg (177 lb 7 5 oz), SpO2 98 %  Vitals:    07/22/19 0400 07/23/19 0551   Weight: 80 3 kg (177 lb 0 5 oz) 80 5 kg (177 lb 7 5 oz)     Body mass index is 25 46 kg/m²    BP Readings from Last 3 Encounters:   07/24/19 151/81   12/18/18 120/70   02/13/18 130/80     Orthostatic Blood Pressures      Most Recent Value   Blood Pressure  151/81 filed at 07/24/2019 0809   Patient Position - Orthostatic VS  Lying filed at 07/24/2019 0809        I/O       07/22 0701 - 07/23 0700 07/23 0701 - 07/24 0700 07/24 0701 - 07/25 0700    I V  (mL/kg) 2027 2 (25 2) 250 (3 1)     Total Intake(mL/kg) 2027 2 (25 2) 250 (3 1)     Urine (mL/kg/hr) 2135 (1 1) 1500 (0 8) 300 (0 9)    Total Output 2135 1500 300    Net -107 8 -1250 -300               Invasive Devices     Peripheral Intravenous Line            Peripheral IV 07/21/19 Right;Dorsal (posterior) Forearm 3 days          Drain Urethral Catheter Non-latex 16 Fr  5 days                  Intake/Output Summary (Last 24 hours) at 7/24/2019 1107  Last data filed at 7/24/2019 0900  Gross per 24 hour   Intake 250 ml   Output 1800 ml   Net -1550 ml         Physical Exam:     Neurologic:  Somnolent but easily arousable, AAO x 3, right sided gaze, left sided weakness  Constitutional:  Well developed, well nourished, NAD  HENT:  AT/NC  Respiratory:  Decreased coarse breath sounds  Cardiovascular: S1-S2 regular, irregularly irregular, no murmur  GI:  Soft, nondistended, hypoactive bowel sounds, nontender, surgical hernia noted  Musculoskeletal:  Atraumatic, no edema, normal dorsalis pedis pulse b/l  Psychiatric:  Speech and behavior appropriate      Medications/ Allergies:     Current Facility-Administered Medications:  acetaminophen 325 mg Rectal Q4H PRN Ligia Sanchez MD    aspirin 325 mg Oral Daily Ligia Sanchez MD    atorvastatin 40 mg Oral QPM Ligia Sanchez MD    bisacodyl 10 mg Rectal Daily PRN Ligia Sanchez MD    dextrose 5 % and sodium chloride 0 9 % with KCl 20 mEq/L 75 mL/hr Intravenous Continuous Ligia Sanchez MD Last Rate: 75 mL/hr (07/24/19 9007)   digoxin 125 mcg Intravenous Daily Ligia Sanchez MD    heparin (porcine) 300-2,000 Units/hr Intravenous Titrated Ligia Sanchez MD Last Rate: 1,350 Units/hr (07/24/19 1025)   HYDROmorphone 0 2 mg Intravenous Q4H PRN Ligia Sanchez MD    ipratropium 0 5 mg Nebulization Q6H Ligia Sanchez MD    levalbuterol 1 25 mg Nebulization Q6H Ligia Sanchez MD    And        sodium chloride 3 mL Nebulization Q6H Ligia Sanchez MD    metoprolol 2 5 mg Intravenous [facility], MD    pneumococcal 13-valent conjugate vaccine 0 5 mL Intramuscular Prior to discharge Ligia Sanchez MD        acetaminophen 325 mg Q4H PRN   bisacodyl 10 mg Daily PRN   HYDROmorphone 0 2 mg Q4H PRN   pneumococcal 13-valent conjugate vaccine 0 5 mL Prior to discharge     Allergies   Allergen Reactions    Hydrocodone        VTE Pharmacologic Prophylaxis: Heparin    Labs:   Troponins:  Results from last 7 days   Lab Units 07/24/19  0150 07/23/19  0307 07/22/19  0631   CK TOTAL U/L 678* 850* 1,153*   CK MB INDEX % <1 0 <1 0 <1 0       CBC with diff:  Results from last 7 days   Lab Units 07/24/19  1042 07/24/19  0150 07/23/19  0307 07/22/19  0631 07/21/19  0624 07/20/19  0430 07/19/19  0518 07/18/19  0506   WBC Thousand/uL 14 63* 15 51* 9 65 8 31 9 80 8 20 10 38* 14 71*   HEMOGLOBIN g/dL 11 3* 10 4* 10 8* 10 8* 11 2* 10 3* 10 2* 11 3*   HEMATOCRIT % 35 4* 33 0* 33 2* 33 7* 34 5* 31 8* 31 6* 35 3*   MCV fL 95 97 95 96 95 95 95 95   PLATELETS Thousands/uL 305 274 270 232 246 172 164 196   MCH pg 30 5 30 6 30 9 30 6 30 7 30 7 30 7 30 4   MCHC g/dL 31 9 31 5 32 5 32 0 32 5 32 4 32 3 32 0   RDW % 15 9* 16 1* 16 1* 16 3* 15 8* 16 2* 16 7* 16 6*   MPV fL 9 7 11 8 9 2 9 6 9 6 9 6 10 1 9 3   NRBC AUTO /100 WBCs 0  --  0 0 0 0 0 0       CMP:  Results from last 7 days   Lab Units 07/24/19  0150 07/23/19  0307 07/22/19  0631 07/21/19  0624 07/20/19  0430 07/19/19  0518 07/18/19  0506 07/17/19  1905   SODIUM mmol/L 139 142 142 139 144 144 143 138   POTASSIUM mmol/L 4 1 3 7 3 7 3 8 3 4* 3 7 3 8 4 7   CHLORIDE mmol/L 107 108 110* 107 110* 111* 108 104   CO2 mmol/L 23 24 22 19* 23 18* 25 24   ANION GAP mmol/L 9 10 10 13 11 15* 10 10   BUN mg/dL 16 19 20 19 17 19 19 17   CREATININE mg/dL 0 96 0 95 0 96 1 02 1 07 1 21 1 42* 1 55*   CALCIUM mg/dL 8 6 8 2* 8 7 8 9 7 9* 7 7* 7 9* 8 0*   AST U/L  --   --   --   --  74* 72*  --  78*   ALT U/L  --   --   --   --  35 24  --  20   ALK PHOS U/L  --   --   --   --  76 68  --  92   TOTAL PROTEIN g/dL  --   --   --   --  6 1* 6 4  --  6 7   ALBUMIN g/dL  --   --   --   --  2 7* 3 2*  --  3 0*   TOTAL BILIRUBIN mg/dL  --   --   --   --  0 90 0 80  --  0 40   EGFR ml/min/1 73sq m 73 74 73 68 64 55 46 41       Magnesium:  Results from last 7 days   Lab Units 07/24/19  0150 07/23/19  8494 07/22/19  0631 07/21/19  3639 07/20/19  0430 07/19/19  0518 07/18/19  0506   MAGNESIUM mg/dL 2 0 2 0 2 4 2 3 2 0 2 0 2 0     Coags:  Results from last 7 days   Lab Units 07/24/19  1042 07/24/19  0149 07/23/19  1811 07/23/19  1049 07/23/19  0307 07/22/19  1946 07/22/19  1234  07/17/19  1905   PTT seconds 30 36* 30 37* 40* 38* 34   < > 25   INR   --   --   --   --   --   --   --   --  1 05    < > = values in this interval not displayed  TSH:  Results from last 7 days   Lab Units 07/17/19  1905   TSH 3RD GENERATON uIU/mL 2 007     Lipid Profile:  Results from last 7 days   Lab Units 07/18/19  0506   CHOLESTEROL mg/dL 204*   TRIGLYCERIDES mg/dL 83   HDL mg/dL 47   LDL CALC mg/dL 140*     Hgb A1c:  Results from last 7 days   Lab Units 07/18/19  0506   HEMOGLOBIN A1C % 6 0     NT-proBNP: No results for input(s): NTBNP in the last 72 hours  Imaging & Testing   I have personally reviewed pertinent reports  Xr Chest Portable    Result Date: 7/22/2019  Narrative: CHEST INDICATION:   Follow-up, shortness of breath cough  COMPARISON:  7/20/2019  EXAM PERFORMED/VIEWS:  XR CHEST PORTABLE FINDINGS: Heart shadow appears unremarkable  Atherosclerotic vascular calcifications are noted  Diminished left basilar opacity compatible with atelectasis or small effusion  No pneumothorax  Osseous structures appear within normal limits for patient age  Impression: Diminished left basilar opacity likely representing atelectasis or small effusion  Workstation performed: [id number]     Xr Chest Portable    Result Date: 7/21/2019  Narrative: CHEST INDICATION:   Tachypnea  COMPARISON:  07/19/2019 EXAM PERFORMED/VIEWS:  XR CHEST PORTABLE 1 image FINDINGS: Cardiomediastinal silhouette appears enlarged  Pulmonary vessels are normal   The lungs are clear  No pneumothorax  Small left-sided pleural effusion  Osseous structures appear within normal limits for patient age  Impression: Small left-sided pleural effusion   Workstation performed: [id number]     Xr Chest Portable    Result Date: 7/19/2019  Narrative: CHEST INDICATION:   Shortness of breath  COMPARISON:  7/17/2019 EXAM PERFORMED/VIEWS:  XR CHEST PORTABLE Images: 2 (duplicate image re-windowed with line enhancement technique) FINDINGS: Cardiomediastinal silhouette appears stable  Calcified uncoiled thoracic aorta  Mild prominence of the ascending aortic contour which could represent tortuosity  CT imaging would be required to evaluate aortic size if clinically relevant  The lungs are grossly clear  Slight elevation of the right diaphragm  No pneumothorax or pleural effusion  Osseous structures appear within normal limits for patient age  Surgical clips right upper quadrant  Impression: No acute cardiopulmonary disease  See comments  Workstation performed: [id number]     Xr Chest 1 View Portable    Result Date: 7/18/2019  Narrative: CHEST INDICATION:   htn  Stroke COMPARISON:  None EXAM PERFORMED/VIEWS:  XR CHEST PORTABLE 1 image FINDINGS: Cardiomediastinal silhouette appears unremarkable  The lungs are clear  No pneumothorax or pleural effusion  Osseous structures appear within normal limits for patient age  Impression: No acute cardiopulmonary disease  Workstation performed: [id number]     Ct Head Wo Contrast    Result Date: 7/20/2019  Narrative: CT BRAIN - WITHOUT CONTRAST INDICATION:   Intracranial hemorrhage; Stroke; Follow-up prior to starting anticoagulation  COMPARISON:  MR brain July 18, 2019, CTA stroke alert July 17, 2019 and CT brain July 17, 2019  TECHNIQUE:  CT examination of the brain was performed  In addition to axial images, coronal 2D reformatted images were created and submitted for interpretation  Radiation dose length product (DLP) for this visit:  997 84 mGy-cm   This examination, like all CT scans performed in the [facility], was performed utilizing techniques to minimize radiation dose exposure, including the use of iterative  reconstruction and automated exposure control    IMAGE QUALITY:  Diagnostic  FINDINGS: PARENCHYMA:  Again identified is a hyperdense right middle cerebral artery  There is an associated right MCA territory infarction  Increased density in the right basal ganglia, most compatible with petechial hemorrhage  There is mild mass effect with sulcal effacement  Midline shift of approximately 3 mm to the left  This has not significantly changed  Compression of the right lateral ventricle  Decreased attenuation in the periventricular regions and centrum semiovale bilaterally, consistent with chronic small vessel ischemic change  Prominence of the extra-axial space adjacent to the anterior left temporal lobe, most compatible with arachnoid cyst  VENTRICLES AND EXTRA-AXIAL SPACES:  Stable mild mass effect from right MCA territory infarction with sulcal effacement and compression of the right lateral ventricle  Midline shift of approximately 3 mm to the left, stable  Otherwise, the ventricular system is moderately dilated  VISUALIZED ORBITS AND PARANASAL SINUSES:  No acute abnormality involving the orbits  Mild scattered sinus mucosal thickening is noted  No fluid levels are seen  CALVARIUM AND EXTRACRANIAL SOFT TISSUES:  Calvarium intact  Incidental note is made of ununited posterior arch of C1  Impression: 1  Evolving right MCA territory infarction with petechial hemorrhage in the right basal ganglia, stable  2   Cerebral atrophy with chronic small vessel ischemic change  Workstation performed: [id number]     Mri Brain Wo Contrast    Result Date: 7/18/2019  Narrative: MRI BRAIN WITHOUT CONTRAST INDICATION: right MCA infarct  COMPARISON:   7/17/2019 CT/CTA TECHNIQUE:  Sagittal T1, axial T2, axial FLAIR, axial T1, axial [place] and axial diffusion imaging  IMAGE QUALITY:  Diagnostic   FINDINGS: BRAIN PARENCHYMA: A large region of acute to subacute infarction involves the principally the perisylvian portion of the right temporal lobe with less pronounced involvement of the adjacent inferior right frontal and anterior right parietal lobes  There is contiguous acute involvement of the head of the right caudate nucleus and corona radiata  Mild edema and mass effect with mild right lateral ventricular compression  Findings are consistent with CT  No additional acute ischemia identified  Mild microangiopathic  ischemic changes involve deep white matter elsewhere  Development of region of acute hemorrhage measuring approximately 1 cm x 1 5 cm acute microhemorrhage within the right lenticular nucleus and to lesser degree adjacent temporal lobe  There is no discrete mass, or midline shift  VENTRICLES:  The ventricles are normal in size and contour  SELLA AND PITUITARY GLAND:  Normal  ORBITS:  Normal  PARANASAL SINUSES:  Normal  VASCULATURE: Poorly visualized right middle cerebral artery CALVARIUM AND SKULL BASE:  Normal  EXTRACRANIAL SOFT TISSUES:  Normal      Impression: Large acute right MCA territory infarction primarily involving the temporal lobe with lesser degree of involvement of the inferolateral frontal lobe and anterior parietal lobe  Consistent with CT/CTA Development of acute microhemorrhage within the right lenticular nucleus and adjacent temporal lobe Findings personally discussed by phone with Dr Leeanna Palomo at 3:10 PM, 7/18/2019 Workstation performed: [id number]     Fl Barium Swallow Video W Speech    Result Date: 7/23/2019  Narrative: A video barium swallow study was performed by the Department of Speech Pathology  Please refer to the report for the official interpretation  The images are stored for archival purposes only  Study images were not formally reviewed by the Radiology Department  Xr Chest 1 View    Result Date: 7/24/2019  Narrative: CHEST INDICATION:   PNA  COMPARISON:  7/22/2019  EXAM PERFORMED/VIEWS:  XR CHEST 1 VIEW FINDINGS: Heart shadow appears unremarkable  Atherosclerotic vascular calcifications are noted  The lungs are clear    No pneumothorax or pleural effusion  Osseous structures appear within normal limits for patient age  Impression: No acute cardiopulmonary disease  Workstation performed: [id number]     Ct Stroke Alert Brain    Result Date: 7/17/2019  Narrative: CT BRAIN - STROKE ALERT PROTOCOL INDICATION:   Acute stroke  COMPARISON:  None  TECHNIQUE:  CT examination of the brain was performed  In addition to axial images, coronal reformatted images were created and submitted for interpretation  Radiation dose length product (DLP) for this visit:  1017 2 mGy-cm   This examination, like all CT scans performed in the [facility], was performed utilizing techniques to minimize radiation dose exposure, including the use of iterative  reconstruction and automated exposure control  IMAGE QUALITY:  Diagnostic  FINDINGS:  PARENCHYMA:  Cortical and subcortical hypoattenuation with sulcal effacement in the right frontoparietal temporal region, also involving the posterior insula, right basal ganglia and external capsule  No evidence of intracranial hemorrhage  No midline shift  Periventricular and subcortical hypoattenuating foci consistent with microangiopathic disease  Increased density in right M1 and proximal M2 branches likely represent thrombus  VENTRICLES AND EXTRA-AXIAL SPACES:  No hydrocephalus or extra-axial collection  VISUALIZED ORBITS AND PARANASAL SINUSES:  Intact globes and orbits  Chronic paranasal sinus disease  CALVARIUM AND EXTRACRANIAL SOFT TISSUES:  No lytic or blastic lesion or soft tissue mass  Impression: 1  Acute to early subacute right MCA territory infarct involving greater than one third of the vascular territory  No evidence of hemorrhagic conversion or significant mass effect  2   Hyperdense right M1 and M2 branches suggesting acute thrombus   Findings were directly discussed with Loy Guadalupe on 7/17/2019 7:16 PM  Workstation performed: [id number]     Vas Carotid Complete Study    Result Date: 7/22/2019  Narrative:  [facility] REPORT CLINICAL: Indications: Patient presents with to evaluate for carotid artery stenosis s/p recent CVA  The patient was found unresponsive  This study was performed in ICU  Risk Factors: The patient has a history of colon cancer and former smoker  Clinical Right Pressure:  163/77 mm Hg, Left Pressure:  / mm Hg  FINDINGS:  Right        Impression  PSV  EDV (cm/s)  Ratio  Dist  ICA                 42          13   0 88  Mid  ICA                  70          20   1 46  Prox  ICA    70 - 99%    303         144   6 31  Dist CCA                  52          13         Mid CCA                   48           8   0 86  Prox CCA                  56           9         Ext Carotid              135          22   2 81  Prox Vert                 49          14         Subclavian                81           2          Left         Impression  PSV  EDV (cm/s)  Ratio  Dist  ICA                 45          13   0 49  Mid  ICA                  60          24   0 66  Prox  ICA    50 - 69%    173          64   1 90  Dist CCA                  84          31         Mid CCA                   91          24   1 02  Prox CCA                  89          25         Ext Carotid              115          31   1 26  Prox Vert                 45          14         Subclavian                76           9            CONCLUSION: RIGHT: There is 70-99% stenosis noted in the internal carotid artery  Plaque is a mixture of heterogenous/homogenous and irregular/smooth  Vertebral artery flow is antegrade  There is no significant subclavian artery disease  LEFT: There is 50-69% stenosis noted in the internal carotid artery  Plaque is heterogenous and irregular  Vertebral artery flow is antegrade  There is no significant subclavian artery disease  No prior exam for comparison    Internal carotid artery stenosis determination by consensus criteria from: Nikki Dtoson et al  Carotid Artery Stenosis: Gray-Scale and Doppler US Diagnosis - Society of Radiologists in [address] [place] [address], Radiology 2003; [phone]  Mri Pelvis Bony Wo And W Contrast    Result Date: 7/19/2019  Narrative: MRI BONY PELVIS WITH AND WITHOUT CONTRAST INDICATION:   Rigth hip pain, r/o non discplaced fracture  Stroke COMPARISON: X-ray from prior day TECHNIQUE:  MR sequences were obtained of the pelvis including: Precontrast: Localizer, Coronal STIR or coronal T2 fat sat, coronal T1, axial T1, axial T2 fat sat, sagittal T2 fat sat, Sagittal T1, axial LAVA T1 fat sat  Post contrast: axial LAVA T1 fat sat, coronal LAVA T1 fat sat  IV Contrast:  8 mL of Gadobutrol injection (SINGLE-DOSE) FINDINGS: RIGHT HIP: -JOINT EFFUSION: None  -BONE MARROW SIGNAL AND ALIGNMENT: Normal marrow signal demonstrated without hip fracture or AVN  -TROCHANTERIC BURSA: Contains fluid -MUSCLES AND TENDONS:  There is diffuse asymmetric hypertrophy of the right gluteus jarrod, with T2 bright signal   Relative hypoperfusion is seen near the origin  No enhancing mass or collection LEFT HIP: -JOINT EFFUSION: None  -BONE MARROW SIGNAL AND ALIGNMENT: Normal marrow signal demonstrated without hip fracture or AVN  -TROCHANTERIC BURSA: Normal  -MUSCLES AND TENDONS:  Intramuscular edema is seen within the proximal aspect of the vastus musculature proximally, and within the gluteus minimus BONY PELVIS: -SI JOINTS AND SYMPHYSIS PUBIS:   Intact  -VISUALIZED LUMBAR SPINE:  Unremarkable  -OVERALL MARROW SIGNAL:  Normal, without suspicious focal lesions  -MUSCULATURE:  As above -PELVIC SOFT TISSUES:   Additionally there is subcutaneous edema within the soft tissues surrounding the right hip SUBCUTANEOUS TISSUES: Normal     Impression: 1  Asymmetric hypertrophy and edema within the right gluteus jarrod, consider rhabdomyolysis 2    No fracture Workstation performed: [id number]     Xr Hips Bilateral 3-4 Vw W Pelvis If Performed    Result Date: 7/18/2019  Narrative: BILATERAL HIPS AND PELVIS INDICATION:   fall  COMPARISON:  None VIEWS:  XR HIPS BILATERAL 3-4 VW W PELVIS IF PERFORMED  FINDINGS: Possible nondisplaced right superior pubic ramus fracture  Correlate for point tenderness  Visualized lower lumbar spine is unremarkable  LEFT HIP: No significant hip degenerative changes  Joint space alignment is maintained  Soft tissues are unremarkable  RIGHT HIP: No significant hip degenerative changes  Joint space alignment is maintained  Soft tissues are unremarkable  Impression: Possible nondisplaced right superior pubic ramus fracture  Correlate for point tenderness  Workstation performed: [id number]     Cta Stroke Alert (head/neck)    Result Date: 7/17/2019  Narrative: CTA NECK AND BRAIN WITH AND WITHOUT CONTRAST INDICATION: Acute stroke  COMPARISON:   7/17/2019  TECHNIQUE:  Post contrast imaging was performed after administration of iodinated contrast through the neck and brain  Post contrast axial 0 625 mm images timed to opacify the arterial system  3D rendering was performed on an independent workstation  MIP reconstructions performed  Coronal reconstructions were performed of the noncontrast portion of the brain  Radiation dose length product (DLP) for this visit:  353 18 mGy-cm   This examination, like all CT scans performed in the [facility], was performed utilizing techniques to minimize radiation dose exposure, including the use of iterative  reconstruction and automated exposure control  IV Contrast:  85 mL of iohexol (OMNIPAQUE)  IMAGE QUALITY:   Motion artifact results in suboptimal evaluation FINDINGS: CERVICAL VASCULATURE AORTIC ARCH AND GREAT VESSELS:  Three-vessel configuration aortic arch  Great vessel origins not well evaluated due to motion artifact  No significant stenosis in the subclavian arteries  RIGHT VERTEBRAL ARTERY CERVICAL SEGMENT:  Normal origin   The vessel is normal in caliber throughout the neck  LEFT VERTEBRAL ARTERY CERVICAL SEGMENT:  Normal origin  The vessel is normal in caliber throughout the neck  RIGHT EXTRACRANIAL CAROTID SEGMENT:  Normal caliber common carotid artery  Calcified plaque at the bifurcation and ICA origin  Severe (70-90%) stenosis at the ICA origin  Decreased caliber of the vessel throughout the cervical segment with intimal thickening  LEFT EXTRACRANIAL CAROTID SEGMENT:  Normal caliber common carotid artery  Calcified plaque the bifurcation and ICA origin resulting in mild to moderate (50-60%) stenosis  NASCET criteria was used to determine the degree of internal carotid artery diameter stenosis  INTRACRANIAL VASCULATURE INTERNAL CAROTID ARTERIES:  Diminutive right carotid siphon likely secondary to severe proximal stenosis  Calcified plaque throughout the left carotid siphon without significant stenosis  Patent ophthalmic arteries  ANTERIOR CIRCULATION:  Absent or severely hypoplastic right A1 segment  Anterior to indicating artery visualized  No stenosis in the anterior cerebral arteries  MIDDLE CEREBRAL ARTERY CIRCULATION: Absence of opacification of the proximal right middle cerebral artery  Minimal enhancement of distal M3 branches suggesting poor collateral flow  No stenosis in the proximal right middle cerebral artery  DISTAL VERTEBRAL ARTERIES:  Normal distal vertebral arteries  Posterior inferior cerebellar artery origins are normal  Normal vertebral basilar junction  BASILAR ARTERY:  Basilar artery is normal in caliber  Normal superior cerebellar arteries  POSTERIOR CEREBRAL ARTERIES: Both posterior cerebral arteries arises from the basilar tip  Both arteries demonstrate normal enhancement  Tiny bilateral posterior communicating arteries  DURAL VENOUS SINUSES:  Limited evaluation without evidence of or thrombosis  NON VASCULAR ANATOMY BONY STRUCTURES:  No acute osseous abnormality  SOFT TISSUES OF THE NECK:  Normal  THORACIC INLET:  Unremarkable  Impression: 1  Severe (70-90%) stenosis at the origin of the right internal carotid artery  Remainder of the vessel is diminutive likely secondary to proximal stenosis  2   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  Very minimal opacification of distal M3 branches suggesting poor collateral flow  I personally discussed this study with Marielos Montana on 2019 at 7:14 PM  Workstation performed: [id number]        EKG / Monitor: Personally reviewed  AFib    Cardiac testing:   Results for orders placed during the hospital encounter of 19   Echo complete with contrast if indicated    Narrative [place] [address]  [address] [facility], [facility] [address]  [phone]    Transthoracic Echocardiogram  2D, M-mode, Doppler, and Color Doppler    Study date:  2019    Patient: Bj Cisneros  MR number: [id number]  Account number: [de-identified]  : 1935  Age: 81 years  Gender: Male  Status: Inpatient  Location: Bedside  Height: 66 in 66 in  Weight: 177 5 lb 177 5 lb  BP: 92/ 54 mmHg    Indications: Cerebral Thrombosis    Diagnoses: I67 9 - Cerebrovascular disease, unspecified    Sonographer:  ABI Calderon  Primary Physician:  Marlene Vinson MD  Referring Physician:  WYATT Martinez  Group:  [facility]  Interpreting Physician:  Jess Dover MD    SUMMARY    PROCEDURE INFORMATION:  This was a technically difficult study  Echocardiographic views were limited due to restricted patient mobility, poor patient compliance, poor acoustic window availability, and lung interference  LEFT VENTRICLE:  Ejection fraction was estimated to be around 50 %  Although no diagnostic regional wall motion abnormality was identified, this possibility cannot be completely excluded on the basis of this study  Wall thickness was mildly increased  There was mild concentric hypertrophy    Doppler parameters were consistent with abnormal left ventricular relaxation (grade 1 diastolic dysfunction)  ATRIAL SEPTUM:  No Large ASD or large PFO identified by buuble study  Buuble study over all limited  MITRAL VALVE:  There was mild regurgitation  AORTIC VALVE:  Transaortic velocity was increased due to valvular stenosis  There was mild to moderate stenosis  peak velocity 2 33 m/se, Peak gradient 23, mean 11 mm of Hg and DENZEL around 1 35 to 1 4 cm2  TRICUSPID VALVE:  There was mild regurgitation  Estimated peak PA pressure was 35 mmHg  HISTORY: PRIOR HISTORY: Neuralgia, Colon Cancer    PROCEDURE: The procedure was performed at the bedside  This was a routine study  The transthoracic approach was used  The study included complete 2D imaging, M-mode, complete spectral Doppler, and color Doppler  The heart rate was 64 bpm,  at the start of the study  Intravenous contrast (agitated saline, 10 ml) was administered to evaluate shunting  Intravenous contrast ( 10 ml) was administered  Echocardiographic views were limited due to restricted patient mobility, poor  patient compliance, poor acoustic window availability, and lung interference  This was a technically difficult study  LEFT VENTRICLE: Size was normal  Ejection fraction was estimated to be around 50 %  Although no diagnostic regional wall motion abnormality was identified, this possibility cannot be completely excluded on the basis of this study  Wall  thickness was mildly increased  There was mild concentric hypertrophy  DOPPLER: Doppler parameters were consistent with abnormal left ventricular relaxation (grade 1 diastolic dysfunction)  RIGHT VENTRICLE: The size was normal  Systolic function was normal     LEFT ATRIUM: Size was normal     ATRIAL SEPTUM: No Large ASD or large PFO identified by buuble study  Buuble study over all limited  RIGHT ATRIUM: Size was normal     MITRAL VALVE: There was normal leaflet separation  DOPPLER: The transmitral velocity was within the normal range  There was no evidence for stenosis  There was mild regurgitation  AORTIC VALVE: The valve was probably trileaflet  Leaflets exhibited mildly increased thickness, mild calcification, and normal cuspal separation  DOPPLER: Transaortic velocity was increased due to valvular stenosis  There was mild to  moderate stenosis  peak velocity 2 33 m/se, Peak gradient 23, mean 11 mm of Hg and DENZEL around 1 35 to 1 4 cm2  There was no regurgitation  TRICUSPID VALVE: DOPPLER: There was mild regurgitation  Estimated peak PA pressure was 35 mmHg  PULMONIC VALVE: DOPPLER: There was no significant regurgitation  PERICARDIUM: There was no thickening or calcification  There was no pericardial effusion  AORTA: The root exhibited normal size  SYSTEMIC VEINS: IVC: The inferior vena cava was not well visualized  SYSTEM MEASUREMENT TABLES    2D mode  AoR Diam 2D: 3 6 cm  LA Diam (2D): 4 3 cm  LA/Ao (2D): 1 19  FS (2D Teich): 22 4 %  IVSd (2D): 1 24 cm  LVDEV: 124 cmï¾³  LVESV: 68 3 cmï¾³  LVIDd(2D): 5 1 cm  LVISd (2D): 3 96 cm  LVOT Area 2D: 3 14 cmï¾²  LVPWd (2D): 1 19 cm  SV (Teich): 55 7 cmï¾³    Apical four chamber  LVEF A4C: 45 %    Unspecified Scan Mode  DENZEL Cont Eq (Peak Patrick): 1 23 cmï¾²  DENZEL Cont Eq (VTI): 1 35 cmï¾²  LVOT (VTI): 18 8 cm  LVOT Diam : 2 cm  LVOT Vmax: 928 mm/s  LVOT Vmax; Mean: 902 mm/s  Peak Grad ; Mean: 3 mm[Hg]  SV (LVOT): 62 cmï¾³  VTI;Mean: 2 mm[Hg]  MV Peak A Patrick: 607 mm/s  MV Peak E Patrick  Mean: 408 mm/s  MVA (PHT): 3 67 cmï¾²  PHT: 60 ms  Max P mm[Hg]  V Max: 2780 mm/s  Vmax: 2780 mm/s  RA Area: 19 8 cmï¾²  RA Volume: 58 5 cmï¾³  TAPSE: 2 1 cm    [employer] [facility]redited [facility]    Prepared and electronically signed by    Cassandra Stokes MD  Signed 2019 11:00:17           Dixie Tian DO      "This note has been constructed using a voice recognition system  Therefore there may be syntax, spelling, and/or grammatical errors   Please call if you have any questions  "

## 2019-07-24 NOTE — PLAN OF CARE
Problem: Potential for Falls  Goal: Patient will remain free of falls  Description  INTERVENTIONS:  - Assess patient frequently for physical needs  -  Identify cognitive and physical deficits and behaviors that affect risk of falls  -  [place] fall precautions as indicated by assessment   - Educate patient/family on patient safety including physical limitations  - Instruct patient to call for assistance with activity based on assessment  - Modify environment to reduce risk of injury  - Consider OT/PT consult to assist with strengthening/mobility  Outcome: Progressing     Problem: Prexisting or High Potential for Compromised Skin Integrity  Goal: Skin integrity is maintained or improved  Description  INTERVENTIONS:  - Identify patients at risk for skin breakdown  - Assess and monitor skin integrity  - Assess and monitor nutrition and hydration status  - Monitor labs (i e  albumin)  - Assess for incontinence   - Turn and reposition patient  - Assist with mobility/ambulation  - Relieve pressure over bony prominences  - Avoid friction and shearing  - Provide appropriate hygiene as needed including keeping skin clean and dry  - Evaluate need for skin moisturizer/barrier cream  - Collaborate with interdisciplinary team (i e  Nutrition, Rehabilitation, etc )   - Patient/family teaching  Outcome: Progressing     Problem: Neurological Deficit  Goal: Neurological status is stable or improving  Description  Interventions:  - Monitor and assess patient's level of consciousness, motor function, sensory function, and level of assistance needed for ADLs  - Monitor and report changes from baseline  Collaborate with interdisciplinary team to initiate plan and implement interventions as ordered  - Provide and maintain a safe environment  - Utilize seizure precautions  - Utilize fall precautions  - Utilize aspiration precautions  - Utilize bleeding precautions  Outcome: Progressing     Problem:  Activity Intolerance/Impaired Mobility  Goal: Mobility/activity is maintained at optimum level for patient  Description  Interventions:  - Assess and monitor patient  barriers to mobility and need for assistive/adaptive devices  - Assess patient's emotional response to limitations  - Collaborate with interdisciplinary team and initiate plans and interventions as ordered  - Encourage independent activity per ability   - Maintain proper body alignment  - Perform active/passive rom as tolerated/ordered  - Plan activities to conserve energy   - Turn patient  Outcome: Progressing     Problem: Communication Impairment  Goal: Ability to express needs and understand communication  Description  Assess patient's communication skills and ability to understand information  Patient will demonstrate use of effective communication techniques, alternative methods of communication and understanding even if not able to speak  - Encourage communication and provide alternate methods of communication as needed  - Collaborate with case management/ for discharge needs  - Include patient/family/caregiver in decisions related to communication  Outcome: Progressing     Problem: Potential for Aspiration  Goal: Non-ventilated patient's risk of aspiration is minimized  Description  Assess and monitor vital signs, respiratory status, and labs (WBC)  Monitor for signs of aspiration (tachypnea, cough, rales, wheezing, cyanosis, fever)  - Assess and monitor patient's ability to swallow  - Place patient up in chair to eat if possible  - HOB up at 90 degrees to eat if unable to get patient up into chair   - Supervise patient during oral intake  - Instruct patient to take small bites  - Instruct patient to take small single sips when taking liquids    - Follow patient-specific strategies generated by speech pathologist   Outcome: Progressing     Problem: Nutrition  Goal: Nutrition/Hydration status is improving  Description  Monitor and assess patient's nutrition/hydration status for malnutrition (ex- brittle hair, bruises, dry skin, pale skin and conjunctiva, muscle wasting, smooth red tongue, and disorientation)  Collaborate with interdisciplinary team and initiate plan and interventions as ordered  Monitor patient's weight and dietary intake as ordered or per policy  Utilize nutrition screening tool and intervene per policy  Determine patient's food preferences and provide high-protein, high-caloric foods as appropriate  - Assist patient with eating   - Allow adequate time for meals   - Encourage patient to take dietary supplement as ordered  - Collaborate with clinical nutritionist   - Include patient/family/caregiver in decisions related to nutrition    Outcome: Progressing     Problem: PAIN - ADULT  Goal: Verbalizes/displays adequate comfort level or baseline comfort level  Description  Interventions:  - Encourage patient to monitor pain and request assistance  - Assess pain using appropriate pain scale  - Administer analgesics based on type and severity of pain and evaluate response  - Implement non-pharmacological measures as appropriate and evaluate response  - Consider cultural and social influences on pain and pain management  - Notify physician/advanced practitioner if interventions unsuccessful or patient reports new pain  Outcome: Progressing     Problem: SAFETY ADULT  Goal: Maintain or return to baseline ADL function  Description  INTERVENTIONS:  -  Assess patient's ability to carry out ADLs; assess patient's baseline for ADL function and identify physical deficits which impact ability to perform ADLs (bathing, care of mouth/teeth, toileting, grooming, dressing, etc )  - Assess/evaluate cause of self-care deficits   - Assess range of motion  - Assess patient's mobility; develop plan if impaired  - Assess patient's need for assistive devices and provide as appropriate  - Encourage maximum independence but intervene and supervise when necessary  ¯ Involve family in performance of ADLs  ¯ Assess for home care needs following discharge   ¯ Request OT consult to assist with ADL evaluation and planning for discharge  ¯ Provide patient education as appropriate  Outcome: Progressing  Goal: Maintain or return mobility status to optimal level  Description  INTERVENTIONS:  - Assess patient's baseline mobility status (ambulation, transfers, stairs, etc )    - Identify cognitive and physical deficits and behaviors that affect mobility  - Identify mobility aids required to assist with transfers and/or ambulation (gait belt, sit-to-stand, lift, walker, cane, etc )  - [place] fall precautions as indicated by assessment  - Record patient progress and toleration of activity level on Mobility SBAR; progress patient to next Phase/Stage  - Instruct patient to call for assistance with activity based on assessment  - Request Rehabilitation consult to assist with strengthening/weightbearing, etc   Outcome: Progressing     Problem: Knowledge Deficit  Goal: Patient/family/caregiver demonstrates understanding of disease process, treatment plan, medications, and discharge instructions  Description  Complete learning assessment and assess knowledge base  Interventions:  - Provide teaching at level of understanding  - Provide teaching via preferred learning methods  Outcome: Progressing     Problem: CARDIOVASCULAR - ADULT  Goal: Maintains optimal cardiac output and hemodynamic stability  Description  INTERVENTIONS:  - Monitor I/O, vital signs and rhythm  - Monitor for S/S and trends of decreased cardiac output i e  bleeding, hypotension  - Administer and titrate ordered vasoactive medications to optimize hemodynamic stability  - Assess quality of pulses, skin color and temperature  - Assess for signs of decreased coronary artery perfusion - ex   Angina  - Instruct patient to report change in severity of symptoms  Outcome: Progressing  Goal: Absence of cardiac dysrhythmias or at baseline rhythm  Description  INTERVENTIONS:  - Continuous cardiac monitoring, monitor vital signs, obtain 12 lead EKG if indicated  - Administer antiarrhythmic and heart rate control medications as ordered  - Monitor electrolytes and administer replacement therapy as ordered  Outcome: Progressing     Problem: METABOLIC, FLUID AND ELECTROLYTES - ADULT  Goal: Electrolytes maintained within normal limits  Description  INTERVENTIONS:  - Monitor labs and assess patient for signs and symptoms of electrolyte imbalances  - Administer electrolyte replacement as ordered  - Monitor response to electrolyte replacements, including repeat lab results as appropriate  - Instruct patient on fluid and nutrition as appropriate  Outcome: Progressing  Goal: Fluid balance maintained  Description  INTERVENTIONS:  - Monitor labs and assess for signs and symptoms of volume excess or deficit  - Monitor I/O and WT  - Instruct patient on fluid and nutrition as appropriate  Outcome: Progressing     Problem: SKIN/TISSUE INTEGRITY - ADULT  Goal: Skin integrity remains intact  Description  INTERVENTIONS  - Identify patients at risk for skin breakdown  - Assess and monitor skin integrity  - Assess and monitor nutrition and hydration status  - Monitor labs (i e  albumin)  - Assess for incontinence   - Turn and reposition patient  - Assist with mobility/ambulation  - Relieve pressure over bony prominences  - Avoid friction and shearing  - Provide appropriate hygiene as needed including keeping skin clean and dry  - Evaluate need for skin moisturizer/barrier cream  - Collaborate with interdisciplinary team (i e  Nutrition, Rehabilitation, etc )   - Patient/family teaching  Outcome: Progressing  Goal: Oral mucous membranes remain intact  Description  INTERVENTIONS  - Assess oral mucosa and hygiene practices  - Implement preventative oral hygiene regimen  - Implement oral medicated treatments as ordered  - Initiate Nutrition services referral as needed  Outcome: Progressing     Problem: MUSCULOSKELETAL - ADULT  Goal: Maintain or return mobility to safest level of function  Description  INTERVENTIONS:  - Assess patient's ability to carry out ADLs; assess patient's baseline for ADL function and identify physical deficits which impact ability to perform ADLs (bathing, care of mouth/teeth, toileting, grooming, dressing, etc )  - Assess/evaluate cause of self-care deficits   - Assess range of motion  - Assess patient's mobility; develop plan if impaired  - Assess patient's need for assistive devices and provide as appropriate  - Encourage maximum independence but intervene and supervise when necessary  - Involve family in performance of ADLs  - Assess for home care needs following discharge   - Request OT consult to assist with ADL evaluation and planning for discharge  - Provide patient education as appropriate  Outcome: Progressing  Goal: Maintain proper alignment of affected body part  Description  INTERVENTIONS:  - Support, maintain and protect limb and body alignment  - Provide pt/fam with appropriate education  Outcome: Progressing     Problem: GENITOURINARY - ADULT  Goal: Maintains or returns to baseline urinary function  Description  INTERVENTIONS:  - Assess urinary function  - Encourage oral fluids to ensure adequate hydration  - Administer IV fluids as ordered to ensure adequate hydration  - Administer ordered medications as needed  - Offer frequent toileting  - Follow urinary retention protocol if ordered  Outcome: Progressing  Goal: Absence of urinary retention  Description  INTERVENTIONS:  - Assess patient's ability to void and empty bladder  - Monitor I/O  - Bladder scan as needed  - Discuss with physician/AP medications to alleviate retention as needed  - Discuss catheterization for long term situations as appropriate   Outcome: Progressing  Goal: Urinary catheter remains patent  Description  INTERVENTIONS:  - Assess patency of urinary catheter  - If patient has a chronic meier, consider changing catheter if non-functioning  - Follow guidelines for intermittent irrigation of non-functioning urinary catheter  Outcome: Progressing     Problem: RESPIRATORY - ADULT  Goal: Achieves optimal ventilation and oxygenation  Description  INTERVENTIONS:  - Assess for changes in respiratory status  - Assess for changes in mentation and behavior  - Position to facilitate oxygenation and minimize respiratory effort  - Oxygen administration by appropriate delivery method based on oxygen saturation (per order) or ABGs  - Encourage broncho-pulmonary hygiene including cough, deep breathe, Incentive Spirometry  - Assess the need for suctioning and aspirate as needed  - Assess and instruct to report SOB or any respiratory difficulty  - Respiratory Therapy support as indicated  -Bipap PRN   Outcome: Progressing

## 2019-07-24 NOTE — CONSULTS
Consultation - [facility]) [facility]  Sariah Wyatt  81 y o  male MRN: [id number]  Unit/Bed#: [address] [place] [address] Encounter: [id number]        Consults    Reason for Consult / Principal Problem: CVA, aspiration, dysphagia    ASSESSMENT and PLAN:    Principal Problem:    Cerebrovascular accident (CVA) due to thrombosis of right middle cerebral artery ([facility] )  Active Problems:    Insomnia    Traumatic rhabdomyolysis ([facility] )    Depression with anxiety    Right hip pain    Urinary retention    Impaired swallowing    Atrial fibrillation with RVR ([facility] )    Symptomatic stenosis of right carotid artery    Respiratory distress    #1  Oropharyngeal dysphagia secondary to CVA with aspiration and protein calorie malnutrition: patient is wishing to pursue PEG for nutrition  On heparin drip for a fib  However, today noted to be tachycardic, WBC increased to 15, temp 100 1  -Discussed PEG with patient  He wishes to pursue this  I did inform him that it would need to be inserted for at least 6 weeks before removal but can be used permanently if needed as well  -Due to meeting current sepsis criteria, will hold off on PEG placement today  Can plan for this when patient is more stable  -Will need heparin held at least 4 hours prior to PEG placement  -Can consider keofed placement for nutrition in the meantime if needed  -------------------------------------------------------------------------------------------------------------------    HPI:  This is an 67-year-old male with a history of colon cancer and neuralgia who presented to the emergency room due to stroke-like symptoms  He was noted to have an acute stroke  Since that time he has been having issues with his swallowing  He is noted to have oropharyngeal dysphagia and was noted to have aspiration on video barium swallow  The patient is wishing to pursue PEG tube for supplemental nutrition  He did state he did not want this permanently    He denies any abdominal surgeries but does have a large right-sided abdominal wall hernia  He is currently on heparin drip for atrial fibrillation  REVIEW OF SYSTEMS:    CONSTITUTIONAL: Denies any fever, chills, or rigors  Good appetite, and no recent weight loss  HEENT: No earache or tinnitus  Denies hearing loss or visual disturbances  CARDIOVASCULAR: No chest pain or palpitations  RESPIRATORY: Denies any hemoptysis, shortness of breath or dyspnea on exertion  +cough  GASTROINTESTINAL: As noted in the History of Present Illness  GENITOURINARY: No problems with urination  Denies any hematuria or dysuria  NEUROLOGIC: No dizziness or vertigo, denies headaches  MUSCULOSKELETAL: Denies any muscle or joint pain  SKIN: Denies skin rashes or itching  ENDOCRINE: Denies excessive thirst  Denies intolerance to heat or cold  PSYCHOSOCIAL: Denies depression or anxiety  Denies any recent memory loss  Historical Information   Past Medical History:   Diagnosis Date    [facility] 05/12/2006    Neuralgia     Last Assessed:6/25/13     No past surgical history on file  Social History   Social History     Substance and Sexual Activity   Alcohol Use Not Currently    Frequency: Never    Binge frequency: Never     Social History     Substance and Sexual Activity   Drug Use No     Social History     Tobacco Use   Smoking Status Former Smoker   Smokeless Tobacco Never Used     No family history on file      Meds/Allergies     Medications Prior to Admission   Medication    mirtazapine (REMERON) 30 mg tablet    QUEtiapine (SEROquel) 100 mg tablet     Current Facility-Administered Medications   Medication Dose Route Frequency    acetaminophen (TYLENOL) rectal suppository 325 mg  325 mg Rectal Q4H PRN    aspirin tablet 325 mg  325 mg Oral Daily    atorvastatin (LIPITOR) tablet 40 mg  40 mg Oral QPM    bisacodyl (DULCOLAX) rectal suppository 10 mg  10 mg Rectal Daily PRN    dextrose 5 % and sodium chloride 0 9 % with KCl 20 mEq/L infusion (premix)  75 mL/hr Intravenous Continuous    digoxin (LANOXIN) injection 125 mcg  125 mcg Intravenous Daily    heparin (porcine) 25,000 units in 250 mL infusion (premix)  300-2,000 Units/hr Intravenous Titrated    HYDROmorphone (DILAUDID) injection 0 2 mg  0 2 mg Intravenous Q4H PRN    ipratropium (ATROVENT) 0 02 % inhalation solution 0 5 mg  0 5 mg Nebulization Q6H    levalbuterol (XOPENEX) inhalation solution 1 25 mg  1 25 mg Nebulization Q6H    And    sodium chloride 0 9 % inhalation solution 3 mL  3 mL Nebulization Q6H    metoprolol (LOPRESSOR) injection 2 5 mg  2 5 mg Intravenous Q6H    pneumococcal 13-valent conjugate vaccine (PREVNAR-13) IM injection 0 5 mL  0 5 mL Intramuscular Prior to discharge       Allergies   Allergen Reactions    Hydrocodone            Objective     Blood pressure 151/81, pulse (!) 114, temperature 100 1 °F (37 8 °C), temperature source Oral, resp  rate 22, height 5' 6" (1 676 m), weight 80 5 kg (177 lb 7 5 oz), SpO2 98 %  Intake/Output Summary (Last 24 hours) at 7/24/2019 0859  Last data filed at 7/24/2019 0700  Gross per 24 hour   Intake 250 ml   Output 1500 ml   Net -1250 ml         PHYSICAL EXAM:      General Appearance:   Alert, cooperative, no distress, appears stated age; chronically ill appearing    HEENT:   Normocephalic, atraumatic, anicteric, no oropharyngeal thrush present      Neck:  Supple, symmetrical, trachea midline, no adenopathy;    thyroid: no enlargement/tenderness/nodules; no carotid  bruit or JVD    Lungs:   Decreased breath sounds and scattered rhonchi bilaterally; no rales or wheezing; respirations unlabored    Heart[de-identified]   S1 and S2 normal; regular rate; irregularly irregular rhythm, murmur present, no rub, or gallop     Abdomen:   Soft, non-tender, non-distended; normal bowel sounds; no masses, no organomegaly; large abdominal wall hernia left of the midline above the umbilicus    Genitalia:   Deferred    Rectal:   Deferred    Extremities:  No cyanosis, clubbing or edema    Pulses:  2+ and symmetric all extremities    Skin:  Skin color, texture, turgor normal, no rashes or lesions    Lymph nodes:  No palpable cervical, axillary or inguinal lymphadenopathy        Lab Results:   Results from last 7 days   Lab Units 07/24/19  0150 07/23/19  0307   WBC Thousand/uL 15 51* 9 65   HEMOGLOBIN g/dL 10 4* 10 8*   HEMATOCRIT % 33 0* 33 2*   PLATELETS Thousands/uL 274 270   NEUTROS PCT %  --  63   LYMPHS PCT %  --  14   MONOS PCT %  --  11   EOS PCT %  --  5     Results from last 7 days   Lab Units 07/24/19  0150  07/20/19  0430   POTASSIUM mmol/L 4 1   < > 3 4*   CHLORIDE mmol/L 107   < > 110*   CO2 mmol/L 23   < > 23   BUN mg/dL 16   < > 17   CREATININE mg/dL 0 96   < > 1 07   CALCIUM mg/dL 8 6   < > 7 9*   ALK PHOS U/L  --   --  76   ALT U/L  --   --  35   AST U/L  --   --  74*    < > = values in this interval not displayed  Results from last 7 days   Lab Units 07/17/19  1905   INR  1 05     Results from last 7 days   Lab Units 07/17/19  1905   LIPASE u/L 68*       Imaging Studies: I have personally reviewed pertinent imaging studies  Xr Chest Portable    Result Date: 7/22/2019  Impression: Diminished left basilar opacity likely representing atelectasis or small effusion  Workstation performed: [id number]     Xr Chest Portable    Result Date: 7/21/2019  Impression: Small left-sided pleural effusion  Workstation performed: [id number]     Xr Chest Portable    Result Date: 7/19/2019  Impression: No acute cardiopulmonary disease  See comments  Workstation performed: [id number]     Xr Chest 1 View Portable    Result Date: 7/18/2019  Impression: No acute cardiopulmonary disease  Workstation performed: [id number]     Ct Head Wo Contrast    Result Date: 7/20/2019  Impression: 1  Evolving right MCA territory infarction with petechial hemorrhage in the right basal ganglia, stable   2   Cerebral atrophy with chronic small vessel ischemic change  Workstation performed: [id number]     Mri Brain Wo Contrast    Result Date: 7/18/2019  Impression: Large acute right MCA territory infarction primarily involving the temporal lobe with lesser degree of involvement of the inferolateral frontal lobe and anterior parietal lobe  Consistent with CT/CTA Development of acute microhemorrhage within the right lenticular nucleus and adjacent temporal lobe Findings personally discussed by phone with Dr Jose Tatum at 3:10 PM, 7/18/2019 Workstation performed: [id number]     Ct Stroke Alert Brain    Result Date: 7/17/2019  Impression: 1  Acute to early subacute right MCA territory infarct involving greater than one third of the vascular territory  No evidence of hemorrhagic conversion or significant mass effect  2   Hyperdense right M1 and M2 branches suggesting acute thrombus  Findings were directly discussed with Ruth Wren on 7/17/2019 7:16 PM  Workstation performed: [id number]     Mri Pelvis Bony Wo And W Contrast    Result Date: 7/19/2019  Impression: 1  Asymmetric hypertrophy and edema within the right gluteus jarrod, consider rhabdomyolysis 2  No fracture Workstation performed: [id number]     Xr Hips Bilateral 3-4 Vw W Pelvis If Performed    Result Date: 7/18/2019  Impression: Possible nondisplaced right superior pubic ramus fracture  Correlate for point tenderness  Workstation performed: [id number]     Cta Stroke Alert (head/neck)    Result Date: 7/17/2019  Impression: 1  Severe (70-90%) stenosis at the origin of the right internal carotid artery  Remainder of the vessel is diminutive likely secondary to proximal stenosis  2   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  Very minimal opacification of distal M3 branches suggesting poor collateral flow  I personally discussed this study with Ruth Wren on 7/17/2019 at 7:14 PM  Workstation performed: [id number]           Patient was seen and examined by Dr Shannon Keller   All rodriguez medical decisions were made by Dr aMrci Huynh  Thank you for allowing us to participate in the care of this present patient  We will follow-up with you closely

## 2019-07-24 NOTE — ASSESSMENT & PLAN NOTE
2/2 acute CVA  VBSS showed aspiration- attempted to start puree with honey thick liquids however patient did not do well, with coughing, choking and not able to take in enough PO for calorie need  · GI consulted for PEG tube, patient agreeable to it for short term, does not want it lifelong  · PEG cannot be placed now given sepsis  · Dobhoff placed by GI service until PEG can be placed     · Start TFs per nutrition recs- 1 2 Jevity, goal 60/hr with 120cc water flushes q4hr Samina from [facility] called regarding patient Metformin 500 plain  Avi Franz states patient is on two types of Metformin ER &  Metformin 500 plain  They have the Metformin ER  Per Avi Franz states the patient will be out of medication on Sunday  Please refer to TE below  I advised her script was done  Avi Franz is asking that MA call pharmacy and ask to speak with [facility] [phone]

## 2019-07-24 NOTE — ASSESSMENT & PLAN NOTE
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"  MRI of the brain - Large acute right MCA territory infarction   Development of acute microhemorrhage within the right lenticular nucleus and adjacent temporal lobe  Endovascular Neurology was contacted by ED, who declined intervention given age and unknown onset   Dr Xavi Castillo with Neurology was contacted, patient not a tPA candidate  Recommended 300mg rectal ASA and stroke pathway with close neurologic monitoring overnight given large infarct with possibility of swelling as timing of infarct is unknown    2D echo-EF 43%, grade 1 diastolic dysfunction  No shunt noted  CVA most likely secondary to symptomatic high-grade right carotid stenosis, but cannot rule out contribution from AFib  , hemoglobin A1c 6  Repeat CT head - Evolving right MCA territory infarction with petechial hemorrhage in the right basal ganglia, stable  · Continue full dose aspirin daily  · Lipitor 40 mg daily for now but increase to 80 mg once rhabdomyolysis is improved, follow-up CPK  · Discussed with Cardiology and Neurology, patient was started on IV heparin  · PT/OT/ST  · Vascular surgery consultation noted, recommended follow-up as outpatient for carotid endarterectomy    Follow-up baseline carotid ultrasound  · Treatment of dysphagia as noted  · STR placement once medically stable

## 2019-07-24 NOTE — PROGRESS NOTES
Progress Note - Tone Melendez 1935, 81 y o  male MRN: [id number]    Unit/Bed#: [address] [address] Encounter: [id number]    Primary Care Provider: Ponce Ziegler MD   Date and time admitted to hospital: 7/17/2019  6:48 PM      Addendum: pt met SIRS criteria today WBC 15, tachycardia  Also had temp 100 1  No clear source of infection  CXR was nml  lactate pending  Respiratory distress  Assessment & Plan  Multifactorial secondary to muscular weakness, difficulty in clearing secretion, atelectasis,  volume overload  Chest x-ray with small left-sided effusion without any acute findings  Patient is afebrile  Remains with significant secretion requiring frequent suctioning  Percussion vest and chest PT  Reconfirmed with the patient regarding DNR/DNI status  Continue BiPAP as needed, Patient does not prefer BiPAP but willing to continue at present  Continue aspiration precaution  Monitor intake and output  Continue IV Lasix as needed to maintain euvolemia      Symptomatic stenosis of right carotid artery  Assessment & Plan  Per vascular surgery follow up as OP for elective CEA evaluation once more stable    Atrial fibrillation with RVR [facility])  Assessment & Plan  New onset, unclear if contributary or subsequent to CVA  Noted to be in new onset rapid AFib with associated tachypnea on 07/19  Heart rate improved with Cardizem drip  Blood pressure remains acceptable  Status post digoxin loading on 07/19  Heart rate is better after digoxin    cardizem gtt stopped  · Cardiology evaluation appreciated  · Monitor intake and output, Continue diuresis as needed  · Started on IV heparin, change to PO AC once can take pills safely  · Continue metprolol PO crushed  · Continue IV digoxin until can take pills safely    Impaired swallowing  Assessment & Plan  Plan as noted    Urinary retention  Assessment & Plan  Bladder scan noted to have more than 800 cc of urinary retention  Setting of CVA, ELOISE, rhabdomyolysis, pelvic fracture  Cohen catheter was placed  Voiding trial when ambulating/ more mobile    Right hip pain  Assessment & Plan  Patient reported pain on the right groin  Related to patient's fall  X-ray revealed possibility of nondisplaced right superior pubic ramus fracture  Orthopedic input appreciated, recommended MRI of the right pelvis/hip  MRI-no evidence of fracture but revealed Asymmetric hypertrophy and edema within the right gluteus jarrod  PT OT as tolerated    Depression with anxiety  Assessment & Plan  Hold home medication mirtazapine and seroquel    Traumatic rhabdomyolysis [facility])  Assessment & Plan  Patient was found lying in the floor for prolonged preop  CPK noted to be 5381 on admission, increase to 6896 and now downtrending  MRI revealed asymmetric hypertrophy and edema within the right gluteus jarrod, likely cause of rhabdomyolysis  Continue IV fluids, monitor renal function, intake and output  PT/OT as tolerated    Insomnia  Assessment & Plan  Hold home medication mirtazapine and seroquel    * Cerebrovascular accident (CVA) due to thrombosis of right middle cerebral artery [facility])  Assessment & Plan  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"  Endovascular Neurology was contacted by ED, who declined intervention given age and unknown onset   Dr Sarah Ta with Neurology was contacted, patient not a tPA candidate   Recommended 300mg rectal ASA and stroke pathway with close neurologic monitoring overnight given large infarct with possibility of swelling as timing of infarct is unknown  MRI of the brain - Large acute right MCA territory infarction primarily involving the temporal lobe with lesser degree of involvement of the inferolateral frontal lobe and anterior parietal lobe   Development of acute microhemorrhage within the right lenticular nucleus and adjacent temporal lobe  2D echo-EF 01%, grade 1 diastolic dysfunction  No shunt noted  CVA most likely secondary to symptomatic high-grade right carotid stenosis, but cannot rule out contribution from AFib  Neuro remained stable, hold sedating medications  , hemoglobin A1c 6  Allow permissive hypertension with SBP goal 130-140  Repeat CT head - Evolving right MCA territory infarction with petechial hemorrhage in the right basal ganglia, stable  · Continue full dose aspirin  · Lipitor 40 mg daily for now but increase to 80 mg once rhabdomyolysis is improved, follow-up CPK  · Discussed with Cardiology and Neurology, patient was started on IV heparin  · PT/OT/ST  · Vascular surgery consultation noted, recommended follow-up as outpatient for carotid endarterectomy  Follow-up baseline carotid ultrasound  · Patient will need anticoagulation in light of AFib based on CT scan findings  · VBSS done today with aspiration- ok to start puree with honey thick liquids  · Clinically patient very high risk of aspiration, and cannot meet caloric intake needs on current diet  · GI consulted for PEG tube, patient agreeable to it for short term, does not want it lifelong  · PEG cannot be placed today given fever  Dobhoff placed by GI service until PEG can be placed  VTE Pharmacologic Prophylaxis:   Pharmacologic: Heparin Drip  Mechanical VTE Prophylaxis in Place: Yes    Patient Centered Rounds: I have performed bedside rounds with nursing staff today  Discussions with Specialists or Other Care Team Provider: Yes  Education and Discussions with Family / Patient:Yes  Time Spent for Care: 45 minutes    More than 50% of total time spent on counseling and coordination of care as described above     Current Length of Stay: 7 day(s)  Current Patient Status: Inpatient     Discharge Plan: pending    Code Status: Level 3 - DNAR and DNI      Subjective:   Patient denies complaints  Still not taking in good PO  Family at bedside, discussed need for PEG but cannot do it today given his SIRS, must rule out sepsis  Family convinced patient to have Dobbhoff placed in the meantime      Objective:     Vitals:   Temp (24hrs), Av 5 °F (36 9 °C), Min:97 3 °F (36 3 °C), Max:100 1 °F (37 8 °C)    Temp:  [97 3 °F (36 3 °C)-100 1 °F (37 8 °C)] 100 1 °F (37 8 °C)  HR:  [] 114  Resp:  [20-25] 22  BP: (140-192)/() 151/81  SpO2:  [94 %-100 %] 98 %  Body mass index is 25 46 kg/m²  Input and Output Summary (last 24 hours): Intake/Output Summary (Last 24 hours) at 2019 1042  Last data filed at 2019 0900  Gross per 24 hour   Intake 250 ml   Output 1800 ml   Net -1550 ml        Physical Exam:     Physical Exam   Constitutional: He appears well-developed  No distress  Chronically ill appearing   HENT:   Head: Normocephalic and atraumatic  Cardiovascular: Regular rhythm  Murmur heard  irregular   Pulmonary/Chest: Effort normal and breath sounds normal  No respiratory distress  He has no wheezes  He has no rales  Scattered rhonchi bilaterally   Abdominal: Soft  Bowel sounds are normal  He exhibits no distension  There is no tenderness  There is no rebound  Neurological: He is alert  Skin: Skin is warm and dry  Psychiatric: He has a normal mood and affect  His behavior is normal    Nursing note and vitals reviewed        Additional Data:     Labs:    Results from last 7 days   Lab Units 19  0150 19  0307 19  0631 19  0624 19  0430   WBC Thousand/uL 15 51* 9 65 8 31 9 80 8 20   HEMOGLOBIN g/dL 10 4* 10 8* 10 8* 11 2* 10 3*   HEMATOCRIT % 33 0* 33 2* 33 7* 34 5* 31 8*   PLATELETS Thousands/uL 274 270 232 246 172   NEUTROS PCT %  --  63  --  72 67 Results from last 7 days   Lab Units 07/24/19  0150 07/23/19  0307 07/22/19  0631  07/20/19  0430 07/19/19  0518  07/17/19  1905   SODIUM mmol/L 139 142 142   < > 144 144   < > 138   POTASSIUM mmol/L 4 1 3 7 3 7   < > 3 4* 3 7   < > 4 7   CHLORIDE mmol/L 107 108 110*   < > 110* 111*   < > 104   CO2 mmol/L 23 24 22   < > 23 18*   < > 24   BUN mg/dL 16 19 20   < > 17 19   < > 17   CREATININE mg/dL 0 96 0 95 0 96   < > 1 07 1 21   < > 1 55*   CALCIUM mg/dL 8 6 8 2* 8 7   < > 7 9* 7 7*   < > 8 0*   TOTAL BILIRUBIN mg/dL  --   --   --   --  0 90 0 80  --  0 40   ALK PHOS U/L  --   --   --   --  76 68  --  92   ALT U/L  --   --   --   --  35 24  --  20   AST U/L  --   --   --   --  74* 72*  --  78*    < > = values in this interval not displayed  Results from last 7 days   Lab Units 07/17/19  1905   INR  1 05         Lab Results   Component Value Date/Time    HGBA1C 6 0 07/18/2019 05:06 AM     Results from last 7 days   Lab Units 07/21/19  1806 07/21/19  1804 07/17/19  1833   POC GLUCOSE mg/dl 122 37* 135     Results from last 7 days   Lab Units 07/21/19  1757 07/18/19  0506 07/18/19  0142 07/17/19  2219 07/17/19  1905   LACTIC ACID mmol/L  --   --  1 6 2 8* 3 3*   PROCALCITONIN ng/ml 0 21 0 21  --   --   --        * I Have Reviewed All Lab Data Listed Above  * Additional Pertinent Lab Tests Reviewed: All Labs Within Last 24 Hours Reviewed    Imaging:     XR chest 1 view   Final Result by Radha Potter MD (07/24 1002)      No acute cardiopulmonary disease  Workstation performed: [id number]         FL barium swallow video w speech   Final Result by LISANDRO MARTINEZ (07/23 1058)      XR chest portable   Final Result by Radha Potter MD (07/22 1158)      Diminished left basilar opacity likely representing atelectasis or small effusion              Workstation performed: [id number]         XR chest portable   Final Result by Herman Sanderson MD (07/21 8517)      Small left-sided pleural effusion  Workstation performed: [id number]         CT head wo contrast   Final Result by Alison Dunbar DO (07/20 1123)   1  Evolving right MCA territory infarction with petechial hemorrhage in the right basal ganglia, stable  2   Cerebral atrophy with chronic small vessel ischemic change  Workstation performed: [id number]         MRI pelvis bony wo and w contrast   Final Result by Justo Stoo MD (07/19 1947)      1  Asymmetric hypertrophy and edema within the right gluteus jarrod, consider rhabdomyolysis   2  No fracture               Workstation performed: [id number]         XR chest portable   Final Result by Can Hitchcock DO (07/19 0940)      No acute cardiopulmonary disease  See comments  Workstation performed: [id number]         XR hips bilateral 3-4 vw w pelvis if performed   Final Result by Stacey Weldon DO (07/18 1547)      Possible nondisplaced right superior pubic ramus fracture  Correlate for point tenderness  Workstation performed: [id number]         MRI brain wo contrast   Final Result by Lauren Aguirre MD (07/18 2537)   Large acute right MCA territory infarction primarily involving the temporal lobe with lesser degree of involvement of the inferolateral frontal lobe and anterior parietal lobe  Consistent with CT/CTA      Development of acute microhemorrhage within the right lenticular nucleus and adjacent temporal lobe      Findings personally discussed by phone with Dr Jose Tatum at 3:10 PM, 7/18/2019            Workstation performed: [id number]         XR chest 1 view portable   Final Result by Nan Mahoney MD (07/18 9489)      No acute cardiopulmonary disease  Workstation performed: [id number]         CTA stroke alert (head/neck)   Final Result by Gracie Young MD (07/17 1922)         1  Severe (70-90%) stenosis at the origin of the right internal carotid artery    Remainder of the vessel is diminutive likely secondary to proximal stenosis  2   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  Very minimal opacification of distal M3 branches suggesting poor collateral flow  I personally discussed this study with Loy Guadalupe on 7/17/2019 at 7:14 PM                            Workstation performed: [id number]         CT stroke alert brain   Final Result by Tiburcio Luna MD (07/17 1922)         1  Acute to early subacute right MCA territory infarct involving greater than one third of the vascular territory  No evidence of hemorrhagic conversion or significant mass effect  2   Hyperdense right M1 and M2 branches suggesting acute thrombus  Findings were directly discussed with Loy Guadalupe on 7/17/2019 7:16 PM       Workstation performed: [id number]         VAS carotid complete study    (Results Pending)     Imaging Reports Reviewed by myself    Cultures:   Blood Culture:   Lab Results   Component Value Date    BLOODCX No Growth After 5 Days  07/17/2019    BLOODCX No Growth After 5 Days   07/17/2019     Urine Culture: No results found for: URINECX  Sputum Culture: No components found for: SPUTUMCX  Wound Culture: No results found for: WOUNDCULT    Last 24 Hours Medication List:     Current Facility-Administered Medications:  acetaminophen 325 mg Rectal Q4H PRN Best Murrell MD    aspirin 325 mg Oral Daily Best Murrell MD    atorvastatin 40 mg Oral QPM Best Murrell MD    bisacodyl 10 mg Rectal Daily PRN Best Murrell MD    dextrose 5 % and sodium chloride 0 9 % with KCl 20 mEq/L 75 mL/hr Intravenous Continuous Best Murrell MD Last Rate: 75 mL/hr (07/24/19 0710)   digoxin 125 mcg Intravenous Daily Best Murrell MD    heparin (porcine) 300-2,000 Units/hr Intravenous Titrated Best Murrell MD Last Rate: 1,350 Units/hr (07/24/19 1025)   HYDROmorphone 0 2 mg Intravenous Q4H PRN Best Murrell MD    ipratropium 0 5 mg Nebulization Q6H Best Murrell MD    levalbuterol 1 25 mg Nebulization Q6H Best Murrell MD And        sodium chloride 3 mL Nebulization Q6H Minnie Simmons MD    metoprolol 2 5 mg Intravenous Q6H Minnie Simmons MD    pneumococcal 13-valent conjugate vaccine 0 5 mL Intramuscular Prior to discharge Minnie Simmons MD         Today, Patient Was Seen By: Minnie Simmons MD    ** Please Note: Dragon 360 Dictation voice to text software may have been used in the creation of this document   **

## 2019-07-25 PROBLEM — M25.551 RIGHT HIP PAIN: Status: RESOLVED | Noted: 2019-01-01 | Resolved: 2019-01-01

## 2019-07-25 PROBLEM — A41.9 SEPSIS DUE TO PNEUMONIA (HCC): Status: ACTIVE | Noted: 2019-01-01

## 2019-07-25 PROBLEM — J96.01 ACUTE RESPIRATORY FAILURE WITH HYPOXIA (HCC): Status: ACTIVE | Noted: 2019-01-01

## 2019-07-25 PROBLEM — J18.9 SEPSIS DUE TO PNEUMONIA (HCC): Status: ACTIVE | Noted: 2019-01-01

## 2019-07-25 PROBLEM — R13.12 OROPHARYNGEAL DYSPHAGIA: Status: ACTIVE | Noted: 2019-01-01

## 2019-07-25 NOTE — PROGRESS NOTES
Progress Note - Yusef Murcia 1935, 81 y o  male MRN: [id number]    Unit/Bed#: [facility] [facility] Encounter: [id number]    Primary Care Provider: Paty Pro MD   Date and time admitted to hospital: 7/17/2019  6:48 PM        Sepsis due to pneumonia [facility])  Assessment & Plan  Patient met sepsis criteria on 4/24 with leukocytosis and tachycardia  No immediate clear source  CXR was nml, lactate nml  Today WBC continues to rise  · Started cefepime/flagyl for presumptive PNA given patients tenuous respiratory status and high risk of aspiration PNA  · CT chest pending  · ID consulted, recs appreciated  · Monitor WBC, fevers    Acute respiratory failure with hypoxia [facility])  Assessment & Plan  Multifactorial secondary to muscular weakness, difficulty in clearing secretion, atelectasis,  volume overload and now possible PNA  Initial Chest x-ray with small left-sided effusion without any acute findings  Remains with significant secretion requiring frequent suctioning  Repeat CXR NAD, however clinically patient appears to have tenuous respiratory status, with increased secretions, weak cough, and sepsis  Likely PNA  · Percussion vest and chest PT  · Continue BiPAP as needed  · Continue aspiration precaution  · Monitor intake and output  · IV Lasix as needed to maintain euvolemia      Symptomatic stenosis of right carotid artery  Assessment & Plan  Per vascular surgery follow up as OP for elective CEA evaluation once more stable    Atrial fibrillation with RVR ([address] )  Assessment & Plan  Noted to be in new onset rapid AFib with associated tachypnea on 07/19  unclear if contributary or subsequent to CVA  Heart rate improved with Cardizem drip (7/19-7/22)  Status post digoxin loading on 07/19  · Cardiology consult appreciated  · Started on IV heparin, change to PO AC once can take pills safely  · Continue metoprolol, increase to 5mg q6hr for better HR control  · Continue IV digoxin until can take pills safely   We dont have liquid digoxin on formulary  If cannot take digoxin via NG/PEG then will need another medication options    Oropharyngeal dysphagia  Assessment & Plan  2/2 acute CVA  VBSS showed aspiration- attempted to start puree with honey thick liquids however patient did not do well, with coughing, choking and not able to take in enough PO for calorie need  · GI consulted for PEG tube, patient agreeable to it for short term, does not want it lifelong  · PEG cannot be placed now given sepsis  · Dobhoff placed by GI service until PEG can be placed  · Start TFs per nutrition recs- 1 2 Jevity, goal 60/hr with 120cc water flushes q4hr    Urinary retention  Assessment & Plan  Bladder scan noted to have more than 800 cc of urinary retention  Setting of CVA, ELOISE, rhabdomyolysis, pelvic fracture  Cohen catheter was placed  Voiding trial when patient more mobile    Depression with anxiety  Assessment & Plan  Holding home medication mirtazapine and seroquel    Traumatic rhabdomyolysis [facility])  Assessment & Plan  Patient was found lying in the floor for prolonged preop  CPK 5381 on admission, peaked at 6896 and now downtrending  MRI revealed asymmetric hypertrophy and edema within the right gluteus jarrod, likely cause of rhabdomyolysis  · Continue to monitor  · Stopped IVFs, now on water flushes with TFs  · Once CK nml can increase lipitor dose    Insomnia  Assessment & Plan  Hold home medication mirtazapine and seroquel    * Cerebrovascular accident (CVA) due to thrombosis of right middle cerebral artery [facility])  Assessment & Plan  Patient found down on the bathroom floor with left-sided weakness and altered mentation 7/17, was a stroke alert  NIH scale 13 on admission  Patient displayed right sided gaze preference, left sided weakness, left sided facial droop  Speech incoherent     CT head, CTA head neck revealed "Severe (70-90%) stenosis at the origin of the right internal carotid artery   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"  MRI of the brain - Large acute right MCA territory infarction   Development of acute microhemorrhage within the right lenticular nucleus and adjacent temporal lobe  Endovascular Neurology was contacted by ED, who declined intervention given age and unknown onset   Dr May Her with Neurology was contacted, patient not a tPA candidate  Recommended 300mg rectal ASA and stroke pathway with close neurologic monitoring overnight given large infarct with possibility of swelling as timing of infarct is unknown    2D echo-EF 02%, grade 1 diastolic dysfunction  No shunt noted  CVA most likely secondary to symptomatic high-grade right carotid stenosis, but cannot rule out contribution from AFib  , hemoglobin A1c 6  Repeat CT head - Evolving right MCA territory infarction with petechial hemorrhage in the right basal ganglia, stable  · Continue full dose aspirin daily  · Lipitor 40 mg daily for now but increase to 80 mg once rhabdomyolysis is improved, follow-up CPK  · Discussed with Cardiology and Neurology, patient was started on IV heparin  · PT/OT/ST  · Vascular surgery consultation noted, recommended follow-up as outpatient for carotid endarterectomy  Follow-up baseline carotid ultrasound  · Treatment of dysphagia as noted  · STR placement once medically stable    Right hip painresolved as of 7/25/2019  Assessment & Plan  Patient reported pain on the right groin    Related to patient's fall  X-ray revealed possibility of nondisplaced right superior pubic ramus fracture  Orthopedic input appreciated, recommended MRI of the right pelvis/hip  MRI-showed no evidence of fracture but revealed Asymmetric hypertrophy and edema within the right gluteus jarrod  PT OT as tolerated  Pain seems to have improved      VTE Pharmacologic Prophylaxis:   Pharmacologic: Heparin Drip  Mechanical VTE Prophylaxis in Place: Yes    Patient Centered Rounds: I have performed bedside rounds with nursing staff today   Discussions with Specialists or Other Care Team Provider: Yes  Education and Discussions with Family / Patient:Yes  Time Spent for Care: 45 minutes  More than 50% of total time spent on counseling and coordination of care as described above  Current Length of Stay: 8 day(s)  Current Patient Status: Inpatient     Discharge Plan: pending    Code Status: Level 3 - DNAR and DNI      Subjective:   Patient denies complaints  More lethargic than yesterday  Family at bedside  Objective:     Vitals:   Temp (24hrs), Av 3 °F (36 8 °C), Min:97 5 °F (36 4 °C), Max:99 1 °F (37 3 °C)    Temp:  [97 5 °F (36 4 °C)-99 1 °F (37 3 °C)] 99 1 °F (37 3 °C)  HR:  [] 86  Resp:  [18-38] 18  BP: (126-175)/() 147/88  SpO2:  [93 %-100 %] 97 %  Body mass index is 24 84 kg/m²  Input and Output Summary (last 24 hours): Intake/Output Summary (Last 24 hours) at 2019 1610  Last data filed at 2019 0424  Gross per 24 hour   Intake 999 ml   Output 1350 ml   Net -351 ml        Physical Exam:     Physical Exam   Constitutional: He appears well-developed  He appears listless  No distress  Chronically ill appearing   HENT:   Head: Normocephalic and atraumatic  Cardiovascular: Regular rhythm  Murmur heard  irregular   Pulmonary/Chest: Effort normal and breath sounds normal  No respiratory distress  He has no wheezes  He has no rales  Scattered rhonchi bilaterally, increased work of breathing, tachypnea, increased secretions, weak cough   Abdominal: Soft  Bowel sounds are normal  He exhibits no distension  There is no tenderness  There is no rebound  Neurological: He appears listless  Skin: Skin is warm and dry  Psychiatric: He has a normal mood and affect  His behavior is normal    Nursing note and vitals reviewed        Additional Data:     Labs:    Results from last 7 days   Lab Units 19  0402 19  1042 19  0150 19  0307  19  0624   WBC Thousand/uL 16 58* 14 63* 15 51* 9 65   < > 9 80   HEMOGLOBIN g/dL 11 1* 11 3* 10 4* 10 8*   < > 11 2*   HEMATOCRIT % 33 6* 35 4* 33 0* 33 2*   < > 34 5*   PLATELETS Thousands/uL 310 305 274 270   < > 246   NEUTROS PCT %  --  75  --  63  --  72    < > = values in this interval not displayed  Results from last 7 days   Lab Units 07/25/19  0402 07/24/19  0150 07/23/19  0307  07/20/19  0430 07/19/19  0518   SODIUM mmol/L 136 139 142   < > 144 144   POTASSIUM mmol/L 3 8 4 1 3 7   < > 3 4* 3 7   CHLORIDE mmol/L 105 107 108   < > 110* 111*   CO2 mmol/L 23 23 24   < > 23 18*   BUN mg/dL 16 16 19   < > 17 19   CREATININE mg/dL 0 98 0 96 0 95   < > 1 07 1 21   CALCIUM mg/dL 8 5 8 6 8 2*   < > 7 9* 7 7*   TOTAL BILIRUBIN mg/dL  --   --   --   --  0 90 0 80   ALK PHOS U/L  --   --   --   --  76 68   ALT U/L  --   --   --   --  35 24   AST U/L  --   --   --   --  74* 72*    < > = values in this interval not displayed  Lab Results   Component Value Date/Time    HGBA1C 6 0 07/18/2019 05:06 AM     Results from last 7 days   Lab Units 07/21/19  1806 07/21/19  1804   POC GLUCOSE mg/dl 122 37*     Results from last 7 days   Lab Units 07/24/19  1917 07/21/19  1757   LACTIC ACID mmol/L 1 2  --    PROCALCITONIN ng/ml  --  0 21       * I Have Reviewed All Lab Data Listed Above  * Additional Pertinent Lab Tests Reviewed: All Labs Within Last 24 Hours Reviewed    Imaging:     XR chest 1 view   Final Result by Cliff Ruiz MD (07/25 4452)      No acute cardiopulmonary disease  Interval resolution of kinked feeding tube course  Tip overlying midline abdomen  Workstation performed: [id number]         XR abdomen 1 vw portable   Final Result by Brenda Tatum MD (07/25 3636)      Feeding tube position as described  Workstation performed: [id number]         XR abdomen 1 view kub   Final Result by Georgina Kang MD (07/25 3258)      1   Tip of the feeding tube is in the upper portion of the stomach   2  Curvilinear calcification of the distal aorta  Consider abdominal aortic ultrasound or abdominal CT to exclude the possibility of infrarenal abdominal aortic aneurysm               Workstation performed: [id number]         XR chest 1 view   Final Result by Janice Clarke MD (07/25 1153)      As clinically suspected, curling of the feeding tube within the esophagus before distally extending below the left hemidiaphragm  Patchy airspace disease at left lung base as better evaluated on chest CT  The examination demonstrates a significant  finding and was documented as such in [facility] for liaison and referring practitioner notification  Workstation performed: [id number]         CT chest wo contrast   Final Result by Janice Clarke MD (07/25 1151)      1  Motion limited examination but demonstrating patchy airspace disease within the left lower lobe and posterior portion of the left upper lobe most suggestive of pneumonia  Follow-up until resolution  2  Small left effusion   3  Curling of the feeding tube within the upper and mid esophagus prior to the tube extending below left hemidiaphragm  The examination demonstrates a significant  finding and was documented as such in [facility] for liaison and referring practitioner notification  Workstation performed: [id number]         XR abdomen 1 view kub   Final Result by Marija Leal MD (07/25 2014)      Feeding tube just beyond the EG junction in the proximal stomach, this can be advanced further into the stomach  Workstation performed: [id number]         XR chest 1 view   Final Result by Best Alex MD (07/24 1002)      No acute cardiopulmonary disease              Workstation performed: [id number]         FL barium swallow video w speech   Final Result by LISANDRO MARTINEZ (07/23 1058)      XR chest portable   Final Result by Best Alex MD (07/22 1158)      Diminished left basilar opacity likely representing atelectasis or small effusion  Workstation performed: [id number]         VAS carotid complete study   Final Result by Yessi Ya MD (07/24 1707)      XR chest portable   Final Result by Lisa Castro MD (07/21 2355)      Small left-sided pleural effusion  Workstation performed: [id number]         CT head wo contrast   Final Result by Olive Crespo DO (07/20 1123)   1  Evolving right MCA territory infarction with petechial hemorrhage in the right basal ganglia, stable  2   Cerebral atrophy with chronic small vessel ischemic change  Workstation performed: [id number]         MRI pelvis bony wo and w contrast   Final Result by Quincy Huang MD (07/19 1947)      1  Asymmetric hypertrophy and edema within the right gluteus jarrod, consider rhabdomyolysis   2  No fracture               Workstation performed: [id number]         XR chest portable   Final Result by Veronica Woods DO (07/19 0940)      No acute cardiopulmonary disease  See comments  Workstation performed: [id number]         XR hips bilateral 3-4 vw w pelvis if performed   Final Result by Joby Love DO (07/18 2047)      Possible nondisplaced right superior pubic ramus fracture  Correlate for point tenderness  Workstation performed: [id number]         MRI brain wo contrast   Final Result by Jarrell Vasquez MD (07/18 6837)   Large acute right MCA territory infarction primarily involving the temporal lobe with lesser degree of involvement of the inferolateral frontal lobe and anterior parietal lobe  Consistent with CT/CTA      Development of acute microhemorrhage within the right lenticular nucleus and adjacent temporal lobe      Findings personally discussed by phone with Dr Selvin Hoff at 3:10 PM, 7/18/2019            Workstation performed: [id number]         XR chest 1 view portable   Final Result by Lisa Castro MD (07/18 8547)      No acute cardiopulmonary disease  Workstation performed: [id number]         CTA stroke alert (head/neck)   Final Result by Demian Bautista MD (07/17 1922)         1  Severe (70-90%) stenosis at the origin of the right internal carotid artery  Remainder of the vessel is diminutive likely secondary to proximal stenosis  2   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  Very minimal opacification of distal M3 branches suggesting poor collateral flow  I personally discussed this study with Peter Crowley on 7/17/2019 at 7:14 PM                            Workstation performed: [id number]         CT stroke alert brain   Final Result by Demian aButista MD (07/17 1922)         1  Acute to early subacute right MCA territory infarct involving greater than one third of the vascular territory  No evidence of hemorrhagic conversion or significant mass effect  2   Hyperdense right M1 and M2 branches suggesting acute thrombus  Findings were directly discussed with Peter Crowley on 7/17/2019 7:16 PM       Workstation performed: [id number]           Imaging Reports Reviewed by myself    Cultures:   Blood Culture:   Lab Results   Component Value Date    BLOODCX No Growth After 5 Days  07/17/2019    BLOODCX No Growth After 5 Days   07/17/2019     Urine Culture: No results found for: URINECX  Sputum Culture: No components found for: SPUTUMCX  Wound Culture: No results found for: WOUNDCULT    Last 24 Hours Medication List:     Current Facility-Administered Medications:  acetaminophen 325 mg Rectal Q4H PRN Kristian Irene MD    [START ON 7/26/2019] aspirin 325 mg Per NG Tube Daily Kristian Irene MD    atorvastatin 40 mg Per NG Tube QPM Kristian Irene MD    bisacodyl 10 mg Rectal Daily PRN Kristian Irene MD    cefepime 2,000 mg Intravenous Q12H Kristian Irene MD Last Rate: 2,000 mg (07/25/19 1457)   [START ON 7/26/2019] digoxin 125 mcg Intravenous Every Other Day Kristian Irene MD    heparin (porcine) 300-2,000 Units/hr Intravenous Ellis Macias MD Last Rate: 1,500 Units/hr (07/25/19 0834)   HYDROmorphone 0 2 mg Intravenous Q4H PRN Kristian Irene MD    ipratropium 0 5 mg Nebulization Q6H Kristian Irene MD    levalbuterol 1 25 mg Nebulization Q6H Kristian Irene MD    And        sodium chloride 3 mL Nebulization Q6H Kristian Irene MD    metoprolol 5 mg Intravenous Q6H Dieselma Perkins DO    metroNIDAZOLE 500 mg Intravenous Julianna Kolb MD Last Rate: 500 mg (07/25/19 1332)   pneumococcal 13-valent conjugate vaccine 0 5 mL Intramuscular Prior to discharge Kristian Irene MD         Today, Patient Was Seen By: Kristian Irene MD    ** Please Note: Dragon 360 Dictation voice to text software may have been used in the creation of this document   **

## 2019-07-25 NOTE — ASSESSMENT & PLAN NOTE
Patient met sepsis criteria on 4/24 with leukocytosis and tachycardia   No immediate clear source  CXR was nml, lactate nml  Today WBC continues to rise  · Started cefepime/flagyl for presumptive PNA given patients tenuous respiratory status and high risk of aspiration PNA  · CT chest pending  · ID consulted, recs appreciated  · Monitor WBC, fevers

## 2019-07-25 NOTE — PHYSICAL THERAPY NOTE
Attempted PT treatment today, however pt very sleepy  Will re-attempt later today if time allows    Vanessa Montanez PT  [phone]

## 2019-07-25 NOTE — OCCUPATIONAL THERAPY NOTE
OT TREATMENT       07/25/19 1205   Restrictions/Precautions   Other Precautions Chair Alarm; Bed Alarm; Fall Risk;Cognitive;Limb alert;Visual impairment  (left hemiparesis, left neglect)   General   Family/Caregiver Present daughter-in-law present   Pain Assessment   Pain Assessment No/denies pain   ADL   Where Assessed Supine, bed   Grooming Assistance 2  Maximal Assistance   Grooming Deficit Wash/dry face   Grooming Comments pt very fatigued today per dtr-in-law   UB Bathing Assistance 1  Total Assistance   ROM - Left Upper Extremities    L Shoulder PROM; Flexion;ABduction; Extension; External rotation; Internal rotation   L Elbow PROM;Elbow flexion;Elbow extension   L Wrist PROM; Wrist flexion;Wrist extension   L Hand PROM; Thumb; Index finger; Long finger;Ring finger;Little finger   L Position Supine   L Weight/Reps/Sets 10 times each   LUE ROM Comment Pt supine in bed with head raised  Sleeping intermittently throughout exercises despite verbal cues from therapist to open eyes and attempt muscle activation  Minimal to no active contraction noted throughout session, however, may have been limited due to pt lethargy  Neuromuscular Education   Head/Neck Control P/AAROM head and neck rotation primarily to left and lateral flexion to left x 10 reps to prevent pain, stiffness, and muscle shortening as pt looks to the right at all times with neck rotated at least 30 degrees  Vision   Vision Comments Scanning/ tracking exercises to left side to improve visual awareness to left  Education provided to pt and daughter-in-law to sit on pt's left side or speak to him from left side to facilitate increased left side attention  Pt encouraged to look towards the room door more  which is on his left side  Pt with decreased ability to track targets into L visual field past midline with both eyes, even with max cues  Perception   Left Attention  Max verbal cues for pt to look to therapist or objects on L side of pt      Activity Tolerance   Activity Tolerance Patient limited by fatigue   Medical Staff Made Aware RN   Assessment   Assessment Pt very fatigued and difficulty maintaining alertness for more than a few seconds at a time  Deferred planned sitting balance activity due to poor alertness today  Pt still cooperative and pleasant when alert  Plan   Treatment Interventions ADL retraining;Visual perceptual retraining;Functional transfer training;UE strengthening/ROM; Endurance training;Cognitive reorientation;Patient/family training;Equipment evaluation/education; Activityengagement; Fine motor coordination activities; Compensatory technique education;Continued evaluation   OT Frequency 5x/wk   Recommendation   OT Discharge Recommendation Short Term Rehab   Licensure   [place] License Number  Francis Joyce [address], OTR/L, [place] Lic# [id number]

## 2019-07-25 NOTE — PROGRESS NOTES
Progress Note - Cardiology     Fredia Hatchet Sr 81 y o  male MRN: [id number]  : 1935  Unit/Bed#: [address] [place] [address] Encounter: [id number]    Assessment and Plan:     1  Atrial fibrillation with rapid ventricular rate     · Likely new onset  Possible history of paroxysmal atrial fibrillation  (Infrequent medical care )  · Rate controlled w/ Digoxin 125 mcg qd & Lopressor increased to 5 mg q6h from 2 5 mg q6h IV (hold if HR<60 BPM)  · Goal heart MAUH <468 keep systolic -086  · Cardizem gtt D/C-ed (-) 2/2 hypotension (113/81) and permissive htn recommendations      2   Sepsis likely 2/2 pneumonia  · Persistent leukocytosis noted this morning 15 51->14 63->16 58  · Afebrile, RR 26, tachycardia (Afib)  · Suspected pneumonia (possibly aspiration)  · Day #1 broad spectrum antibiotics initiated  Cefepime 2g q12h IV & Flagyl 500mg q8h   · Lactic acid 1 2 on   · Procal negative on  & , repeat procal ordered   · C-Xray showed no acute cardiopulmonary disease  · UA on  neg nitrate and leukocytes, WBC 1-2, large blood and moderate bacteria  Repeat UA ordered  · Blood culture: no growth after 5d  · Monitor closely     3  CVA  · 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     · Anticipated outpatient CEA per vascular surgery  · Right carotid stenosis, complete vascular study ordered  · Continue w/ Heparin gtt and aspirin 300mg qd rectally  · Neurology consulted, see recommendation  · FL barium video study done on : Speech and swallow therapy recommended advancing diet to pureed, but patient did not tolerate well per chart review  The C remains NPO  · PEG tube indicated, patient agrees but had to be deferred in light of SIRS on   · S/p NG tube placement on   · Continue PT/OT, anticipated D/C to STR  · Deferred feeding tube and tracheostomy      4  Dyslipidemia      · Currently on Lipitor 40 mg, consider increasing to Lipitor 80 mg per Neuro recommendation      5  Right hip pain     · Seen by Ortho  No obvious pelvis fracture   Continue current pain management  · MRI shows asymmetric hypertrophy and edema within the right gluteus jarrod, consider rhabdomyolysis      6  Traumatic rhabdomyolysis     · Patient was lying on the floor for prolonged period of time     · Total CK 3,324->850   Monitor electrolytes  Continue gentle IV hydration  EF around 50%        7  Respiratory distress   · Persistent tachpnea, s/p chest therapy and Lasix 20mg IV x1 7/19-22  · Continue w/ O2 via NC  · Continue BiPAP 12/5 q h s  · ABG ordered by primary team   · Currently NPO per speech therapy  · Xopenex PRN     8   History of acute kidney injury     · Serum creatinine now improving  Cr 1 21->0 95->0 98    Subjective / Objective:     Patient seen examined at bedside, daughter is present in the room  No acute overnight events reported  Patient is resting comfortably  He does not have any complaints, although he has a dry cough  He denies shortness of breath chest pain and palpitation  NG tube was placed this morning  Vitals: Blood pressure 168/87, pulse 77, temperature 97 5 °F (36 4 °C), temperature source Tympanic, resp  rate (!) 26, height 5' 6" (1 676 m), weight 78 5 kg (173 lb 1 6 oz), SpO2 97 %  Vitals:    07/23/19 0551 07/25/19 0540   Weight: 80 5 kg (177 lb 7 5 oz) 78 5 kg (173 lb 1 6 oz)     Body mass index is 24 84 kg/m²    BP Readings from Last 3 Encounters:   07/25/19 168/87   12/18/18 120/70   02/13/18 130/80     Orthostatic Blood Pressures      Most Recent Value   Blood Pressure  168/87 filed at 07/25/2019 0540   Patient Position - Orthostatic VS  Lying filed at 07/25/2019 0540        I/O       07/23 0701 - 07/24 0700 07/24 0701 - 07/25 0700 07/25 0701 - 07/26 0700    I V  (mL/kg) 250 (3 1) 999 (12 7)     Total Intake(mL/kg) 250 (3 1) 999 (12 7)     Urine (mL/kg/hr) 1500 (0 8) 1650 (0 9)     Total Output 1500 1650     Net -1250 -651                Invasive Devices     Peripheral Intravenous Line            Peripheral IV 07/21/19 Right;Dorsal (posterior) Forearm 4 days          Drain            Urethral Catheter Non-latex 16 Fr  6 days                  Intake/Output Summary (Last 24 hours) at 7/25/2019 0943  Last data filed at 7/25/2019 0424  Gross per 24 hour   Intake 999 ml   Output 1350 ml   Net -351 ml         Physical Exam:      Neurologic:  AAO x 3, right sided gaze, left sided weakness  Constitutional:  Well developed, well nourished, NAD  HENT:  AT/NC  Respiratory:  Intermittent dry cough   Decreased coarse breath sounds  Cardiovascular: S1-S2 regular, irregularly irregular, no murmur  GI:  Soft, nondistended, hypoactive bowel sounds, nontender, surgical hernia noted  Musculoskeletal:  Atraumatic, no edema, normal dorsalis pedis pulse b/l  Psychiatric:  Speech and behavior appropriate     Medications/ Allergies:     Current Facility-Administered Medications:  acetaminophen 325 mg Rectal Q4H PRN Kristian Irene MD    aspirin 325 mg Oral Daily Kristian Irene MD    atorvastatin 40 mg Oral QPM Kristian Irene MD    bisacodyl 10 mg Rectal Daily PRN Kristian Irene MD    dextrose 5 % and sodium chloride 0 9 % with KCl 20 mEq/L 75 mL/hr Intravenous Continuous Kristian Irene MD Last Rate: 75 mL/hr (07/24/19 2210)   digoxin 125 mcg Oral Q48H Kristian Irene MD    heparin (porcine) 300-2,000 Units/hr Intravenous Titrated Kristian Irene MD Last Rate: 1,500 Units/hr (07/25/19 0535)   HYDROmorphone 0 2 mg Intravenous Q4H PRN Kristian Irene MD    ipratropium 0 5 mg Nebulization Q6H Kristian Irene MD    levalbuterol 1 25 mg Nebulization Q6H Kristian Irene MD    And        sodium chloride 3 mL Nebulization Q6H Kristian Irene MD    metoprolol 2 5 mg Intravenous Q6H WYATT Go    pneumococcal 13-valent conjugate vaccine 0 5 mL Intramuscular Prior to discharge Kristian Irene MD        acetaminophen 325 mg Q4H PRN   bisacodyl 10 mg Daily PRN   HYDROmorphone 0 2 mg Q4H PRN   pneumococcal 13-valent conjugate vaccine 0 5 mL Prior to discharge     Allergies   Allergen Reactions    Hydrocodone        VTE Pharmacologic Prophylaxis:   Heparin    Labs:   Troponins:  Results from last 7 days   Lab Units 07/24/19  0150 07/23/19  0307 07/22/19  0631   CK TOTAL U/L 678* 850* 1,153*   CK MB INDEX % <1 0 <1 0 <1 0       CBC with diff:  Results from last 7 days   Lab Units 07/25/19  0402 07/24/19  1042 07/24/19  0150 07/23/19  0307 07/22/19  0631 07/21/19  0624 07/20/19  0430 07/19/19  0518   WBC Thousand/uL 16 58* 14 63* 15 51* 9 65 8 31 9 80 8 20 10 38*   HEMOGLOBIN g/dL 11 1* 11 3* 10 4* 10 8* 10 8* 11 2* 10 3* 10 2*   HEMATOCRIT % 33 6* 35 4* 33 0* 33 2* 33 7* 34 5* 31 8* 31 6*   MCV fL 94 95 97 95 96 95 95 95   PLATELETS Thousands/uL 310 305 274 270 232 246 172 164   MCH pg 31 2 30 5 30 6 30 9 30 6 30 7 30 7 30 7   MCHC g/dL 33 0 31 9 31 5 32 5 32 0 32 5 32 4 32 3   RDW % 15 9* 15 9* 16 1* 16 1* 16 3* 15 8* 16 2* 16 7*   MPV fL 9 2 9 7 11 8 9 2 9 6 9 6 9 6 10 1   NRBC AUTO /100 WBCs 0 0  --  0 0 0 0 0       CMP:  Results from last 7 days   Lab Units 07/25/19  0402 07/24/19  0150 07/23/19  0307 07/22/19  0631 07/21/19  0624 07/20/19  0430 07/19/19  0518   SODIUM mmol/L 136 139 142 142 139 144 144   POTASSIUM mmol/L 3 8 4 1 3 7 3 7 3 8 3 4* 3 7   CHLORIDE mmol/L 105 107 108 110* 107 110* 111*   CO2 mmol/L 23 23 24 22 19* 23 18*   ANION GAP mmol/L 8 9 10 10 13 11 15*   BUN mg/dL 16 16 19 20 19 17 19   CREATININE mg/dL 0 98 0 96 0 95 0 96 1 02 1 07 1 21   CALCIUM mg/dL 8 5 8 6 8 2* 8 7 8 9 7 9* 7 7*   AST U/L  --   --   --   --   --  74* 72*   ALT U/L  --   --   --   --   --  35 24   ALK PHOS U/L  --   --   --   --   --  76 68   TOTAL PROTEIN g/dL  --   --   --   --   --  6 1* 6 4   ALBUMIN g/dL  --   --   --   --   --  2 7* 3 2*   TOTAL BILIRUBIN mg/dL  --   --   --   --   --  0 90 0 80   EGFR ml/min/1 73sq m 72 73 74 73 68 64 55       Magnesium:  Results from last 7 days   Lab Units 07/25/19  0402 07/24/19  0150 07/23/19  0307 07/22/19  0631 07/21/19  0624 07/20/19  0430 07/19/19  0518   MAGNESIUM mg/dL 1 8 2 0 2 0 2 4 2 3 2 0 2 0     Coags:  Results from last 7 days   Lab Units 07/25/19  0402 07/24/19  1917 07/24/19  1042 07/24/19  0149 07/23/19  1811 07/23/19  1049 07/23/19  0307   PTT seconds 70* 55* 30 36* 30 37* 40*     TSH:    Lipid Profile:    Hgb A1c:    NT-proBNP: No results for input(s): NTBNP in the last 72 hours  Imaging & Testing   I have personally reviewed pertinent reports  Xr Chest Portable    Result Date: 7/22/2019  Narrative: CHEST INDICATION:   Follow-up, shortness of breath cough  COMPARISON:  7/20/2019  EXAM PERFORMED/VIEWS:  XR CHEST PORTABLE FINDINGS: Heart shadow appears unremarkable  Atherosclerotic vascular calcifications are noted  Diminished left basilar opacity compatible with atelectasis or small effusion  No pneumothorax  Osseous structures appear within normal limits for patient age  Impression: Diminished left basilar opacity likely representing atelectasis or small effusion  Workstation performed: [id number]     Xr Chest Portable    Result Date: 7/21/2019  Narrative: CHEST INDICATION:   Tachypnea  COMPARISON:  07/19/2019 EXAM PERFORMED/VIEWS:  XR CHEST PORTABLE 1 image FINDINGS: Cardiomediastinal silhouette appears enlarged  Pulmonary vessels are normal   The lungs are clear  No pneumothorax  Small left-sided pleural effusion  Osseous structures appear within normal limits for patient age  Impression: Small left-sided pleural effusion  Workstation performed: [id number]     Xr Chest Portable    Result Date: 7/19/2019  Narrative: CHEST INDICATION:   Shortness of breath  COMPARISON:  7/17/2019 EXAM PERFORMED/VIEWS:  XR CHEST PORTABLE Images: 2 (duplicate image re-windowed with line enhancement technique) FINDINGS: Cardiomediastinal silhouette appears stable  Calcified uncoiled thoracic aorta  Mild prominence of the ascending aortic contour which could represent tortuosity    CT imaging would be required to evaluate aortic size if clinically relevant  The lungs are grossly clear  Slight elevation of the right diaphragm  No pneumothorax or pleural effusion  Osseous structures appear within normal limits for patient age  Surgical clips right upper quadrant  Impression: No acute cardiopulmonary disease  See comments  Workstation performed: [id number]     Xr Chest 1 View Portable    Result Date: 7/18/2019  Narrative: CHEST INDICATION:   htn  Stroke COMPARISON:  None EXAM PERFORMED/VIEWS:  XR CHEST PORTABLE 1 image FINDINGS: Cardiomediastinal silhouette appears unremarkable  The lungs are clear  No pneumothorax or pleural effusion  Osseous structures appear within normal limits for patient age  Impression: No acute cardiopulmonary disease  Workstation performed: [id number]     Xr Abdomen 1 View Kub    Result Date: 7/25/2019  Narrative: ABDOMEN INDICATION:   check location of Dobbhoff tube  COMPARISON:  None VIEWS:  AP supine Images: 2 FINDINGS: There is a nonobstructive bowel gas pattern  There is residual barium within the colon  A feeding tube is present just beyond the EG junction in the proximal stomach  This can be advanced into the stomach  No discernible free air on this supine study  Upright or left lateral decubitus imaging is more sensitive to detect subtle free air in the appropriate setting  No pathologic calcifications or soft tissue masses  Visualized lung bases are clear  Visualized osseous structures are unremarkable for the patient's age  Impression: Feeding tube just beyond the EG junction in the proximal stomach, this can be advanced further into the stomach  Workstation performed: [id number]     Ct Head Wo Contrast    Result Date: 7/20/2019  Narrative: CT BRAIN - WITHOUT CONTRAST INDICATION:   Intracranial hemorrhage; Stroke; Follow-up prior to starting anticoagulation  COMPARISON:  MR brain July 18, 2019, CTA stroke alert July 17, 2019 and CT brain July 17, 2019   TECHNIQUE:  CT examination of the brain was performed  In addition to axial images, coronal 2D reformatted images were created and submitted for interpretation  Radiation dose length product (DLP) for this visit:  997 84 mGy-cm   This examination, like all CT scans performed in the [facility], was performed utilizing techniques to minimize radiation dose exposure, including the use of iterative  reconstruction and automated exposure control  IMAGE QUALITY:  Diagnostic  FINDINGS: PARENCHYMA:  Again identified is a hyperdense right middle cerebral artery  There is an associated right MCA territory infarction  Increased density in the right basal ganglia, most compatible with petechial hemorrhage  There is mild mass effect with sulcal effacement  Midline shift of approximately 3 mm to the left  This has not significantly changed  Compression of the right lateral ventricle  Decreased attenuation in the periventricular regions and centrum semiovale bilaterally, consistent with chronic small vessel ischemic change  Prominence of the extra-axial space adjacent to the anterior left temporal lobe, most compatible with arachnoid cyst  VENTRICLES AND EXTRA-AXIAL SPACES:  Stable mild mass effect from right MCA territory infarction with sulcal effacement and compression of the right lateral ventricle  Midline shift of approximately 3 mm to the left, stable  Otherwise, the ventricular system is moderately dilated  VISUALIZED ORBITS AND PARANASAL SINUSES:  No acute abnormality involving the orbits  Mild scattered sinus mucosal thickening is noted  No fluid levels are seen  CALVARIUM AND EXTRACRANIAL SOFT TISSUES:  Calvarium intact  Incidental note is made of ununited posterior arch of C1  Impression: 1  Evolving right MCA territory infarction with petechial hemorrhage in the right basal ganglia, stable  2   Cerebral atrophy with chronic small vessel ischemic change   Workstation performed: [id number]     Mri Brain Wo Contrast    Result Date: 7/18/2019  Narrative: MRI BRAIN WITHOUT CONTRAST INDICATION: right MCA infarct  COMPARISON:   7/17/2019 CT/CTA TECHNIQUE:  Sagittal T1, axial T2, axial FLAIR, axial T1, axial [place] and axial diffusion imaging  IMAGE QUALITY:  Diagnostic  FINDINGS: BRAIN PARENCHYMA: A large region of acute to subacute infarction involves the principally the perisylvian portion of the right temporal lobe with less pronounced involvement of the adjacent inferior right frontal and anterior right parietal lobes  There is contiguous acute involvement of the head of the right caudate nucleus and corona radiata  Mild edema and mass effect with mild right lateral ventricular compression  Findings are consistent with CT  No additional acute ischemia identified  Mild microangiopathic  ischemic changes involve deep white matter elsewhere  Development of region of acute hemorrhage measuring approximately 1 cm x 1 5 cm acute microhemorrhage within the right lenticular nucleus and to lesser degree adjacent temporal lobe  There is no discrete mass, or midline shift  VENTRICLES:  The ventricles are normal in size and contour  SELLA AND PITUITARY GLAND:  Normal  ORBITS:  Normal  PARANASAL SINUSES:  Normal  VASCULATURE: Poorly visualized right middle cerebral artery CALVARIUM AND SKULL BASE:  Normal  EXTRACRANIAL SOFT TISSUES:  Normal      Impression: Large acute right MCA territory infarction primarily involving the temporal lobe with lesser degree of involvement of the inferolateral frontal lobe and anterior parietal lobe    Consistent with CT/CTA Development of acute microhemorrhage within the right lenticular nucleus and adjacent temporal lobe Findings personally discussed by phone with Dr Janice Trejo at 3:10 PM, 7/18/2019 Workstation performed: [id number]     Fl Barium Swallow Video W Speech    Result Date: 7/23/2019  Narrative: A video barium swallow study was performed by the Department of Speech Pathology  Please refer to the report for the official interpretation  The images are stored for archival purposes only  Study images were not formally reviewed by the Radiology Department  Xr Chest 1 View    Result Date: 7/24/2019  Narrative: CHEST INDICATION:   PNA  COMPARISON:  7/22/2019  EXAM PERFORMED/VIEWS:  XR CHEST 1 VIEW FINDINGS: Heart shadow appears unremarkable  Atherosclerotic vascular calcifications are noted  The lungs are clear  No pneumothorax or pleural effusion  Osseous structures appear within normal limits for patient age  Impression: No acute cardiopulmonary disease  Workstation performed: [id number]     Ct Stroke Alert Brain    Result Date: 7/17/2019  Narrative: CT BRAIN - STROKE ALERT PROTOCOL INDICATION:   Acute stroke  COMPARISON:  None  TECHNIQUE:  CT examination of the brain was performed  In addition to axial images, coronal reformatted images were created and submitted for interpretation  Radiation dose length product (DLP) for this visit:  1017 2 mGy-cm   This examination, like all CT scans performed in the [facility], was performed utilizing techniques to minimize radiation dose exposure, including the use of iterative  reconstruction and automated exposure control  IMAGE QUALITY:  Diagnostic  FINDINGS:  PARENCHYMA:  Cortical and subcortical hypoattenuation with sulcal effacement in the right frontoparietal temporal region, also involving the posterior insula, right basal ganglia and external capsule  No evidence of intracranial hemorrhage  No midline shift  Periventricular and subcortical hypoattenuating foci consistent with microangiopathic disease  Increased density in right M1 and proximal M2 branches likely represent thrombus  VENTRICLES AND EXTRA-AXIAL SPACES:  No hydrocephalus or extra-axial collection  VISUALIZED ORBITS AND PARANASAL SINUSES:  Intact globes and orbits  Chronic paranasal sinus disease   CALVARIUM AND EXTRACRANIAL SOFT TISSUES:  No lytic or blastic lesion or soft tissue mass  Impression: 1  Acute to early subacute right MCA territory infarct involving greater than one third of the vascular territory  No evidence of hemorrhagic conversion or significant mass effect  2   Hyperdense right M1 and M2 branches suggesting acute thrombus  Findings were directly discussed with Lindsay Peralta on 7/17/2019 7:16 PM  Workstation performed: [id number]     Vas Carotid Complete Study    Result Date: 7/24/2019  Narrative:  [facility] REPORT CLINICAL: Indications: Patient presents with to evaluate for carotid artery stenosis s/p recent CVA  The patient was found unresponsive  This study was performed in ICU  Risk Factors: The patient has a history of colon cancer and former smoker  Clinical Right Pressure:  163/77 mm Hg, Left Pressure:  / mm Hg  FINDINGS:  Right        Impression  PSV  EDV (cm/s)  Ratio  Dist  ICA                 42          13   0 88  Mid  ICA                  70          20   1 46  Prox  ICA    70 - 99%    303         144   6 31  Dist CCA                  52          13         Mid CCA                   48           8   0 86  Prox CCA                  56           9         Ext Carotid              135          22   2 81  Prox Vert                 49          14         Subclavian                81           2          Left         Impression  PSV  EDV (cm/s)  Ratio  Dist  ICA                 45          13   0 49  Mid  ICA                  60          24   0 66  Prox  ICA    50 - 69%    173          64   1 90  Dist CCA                  84          31         Mid CCA                   91          24   1 02  Prox CCA                  89          25         Ext Carotid              115          31   1 26  Prox Vert                 45          14         Subclavian                76           9            CONCLUSION:  Impression RIGHT: There is 70-99% stenosis noted in the internal carotid artery   Plaque is a mixture of heterogenous/homogenous and irregular/smooth  Vertebral artery flow is antegrade  There is no significant subclavian artery disease  LEFT: There is 50-69% stenosis noted in the internal carotid artery  Plaque is heterogenous and irregular  Vertebral artery flow is antegrade  There is no significant subclavian artery disease  No prior exam for comparison  Internal carotid artery stenosis determination by consensus criteria from: Aly Mayers et al  Carotid Artery Stenosis: Gray-Scale and Doppler US Diagnosis - Society of Radiologists in [address] [place] [address] [facility], Radiology 2003; [phone]  SIGNATURE: Electronically Signed by: Gwendolyn Crump MD on 2019-07-24 05:07:17 PM    Mri Pelvis Bony Wo And W Contrast    Result Date: 7/19/2019  Narrative: MRI BONY PELVIS WITH AND WITHOUT CONTRAST INDICATION:   Rigth hip pain, r/o non discplaced fracture  Stroke COMPARISON: X-ray from prior day TECHNIQUE:  MR sequences were obtained of the pelvis including: Precontrast: Localizer, Coronal STIR or coronal T2 fat sat, coronal T1, axial T1, axial T2 fat sat, sagittal T2 fat sat, Sagittal T1, axial LAVA T1 fat sat  Post contrast: axial LAVA T1 fat sat, coronal LAVA T1 fat sat  IV Contrast:  8 mL of Gadobutrol injection (SINGLE-DOSE) FINDINGS: RIGHT HIP: -JOINT EFFUSION: None  -BONE MARROW SIGNAL AND ALIGNMENT: Normal marrow signal demonstrated without hip fracture or AVN  -TROCHANTERIC BURSA: Contains fluid -MUSCLES AND TENDONS:  There is diffuse asymmetric hypertrophy of the right gluteus jarrod, with T2 bright signal   Relative hypoperfusion is seen near the origin  No enhancing mass or collection LEFT HIP: -JOINT EFFUSION: None  -BONE MARROW SIGNAL AND ALIGNMENT: Normal marrow signal demonstrated without hip fracture or AVN   -TROCHANTERIC BURSA: Normal  -MUSCLES AND TENDONS:  Intramuscular edema is seen within the proximal aspect of the vastus musculature proximally, and within the gluteus minimus BONY PELVIS: -SI JOINTS AND SYMPHYSIS PUBIS:   Intact  -VISUALIZED LUMBAR SPINE:  Unremarkable  -OVERALL MARROW SIGNAL:  Normal, without suspicious focal lesions  -MUSCULATURE:  As above -PELVIC SOFT TISSUES:   Additionally there is subcutaneous edema within the soft tissues surrounding the right hip SUBCUTANEOUS TISSUES: Normal     Impression: 1  Asymmetric hypertrophy and edema within the right gluteus jarrod, consider rhabdomyolysis 2  No fracture Workstation performed: [id number]     Xr Hips Bilateral 3-4 Vw W Pelvis If Performed    Result Date: 7/18/2019  Narrative: BILATERAL HIPS AND PELVIS INDICATION:   fall  COMPARISON:  None VIEWS:  XR HIPS BILATERAL 3-4 VW W PELVIS IF PERFORMED  FINDINGS: Possible nondisplaced right superior pubic ramus fracture  Correlate for point tenderness  Visualized lower lumbar spine is unremarkable  LEFT HIP: No significant hip degenerative changes  Joint space alignment is maintained  Soft tissues are unremarkable  RIGHT HIP: No significant hip degenerative changes  Joint space alignment is maintained  Soft tissues are unremarkable  Impression: Possible nondisplaced right superior pubic ramus fracture  Correlate for point tenderness  Workstation performed: [id number]     Cta Stroke Alert (head/neck)    Result Date: 7/17/2019  Narrative: CTA NECK AND BRAIN WITH AND WITHOUT CONTRAST INDICATION: Acute stroke  COMPARISON:   7/17/2019  TECHNIQUE:  Post contrast imaging was performed after administration of iodinated contrast through the neck and brain  Post contrast axial 0 625 mm images timed to opacify the arterial system  3D rendering was performed on an independent workstation  MIP reconstructions performed  Coronal reconstructions were performed of the noncontrast portion of the brain  Radiation dose length product (DLP) for this visit:  353 18 mGy-cm     This examination, like all CT scans performed in the [facility], was performed utilizing techniques to minimize radiation dose exposure, including the use of iterative  reconstruction and automated exposure control  IV Contrast:  85 mL of iohexol (OMNIPAQUE)  IMAGE QUALITY:   Motion artifact results in suboptimal evaluation FINDINGS: CERVICAL VASCULATURE AORTIC ARCH AND GREAT VESSELS:  Three-vessel configuration aortic arch  Great vessel origins not well evaluated due to motion artifact  No significant stenosis in the subclavian arteries  RIGHT VERTEBRAL ARTERY CERVICAL SEGMENT:  Normal origin  The vessel is normal in caliber throughout the neck  LEFT VERTEBRAL ARTERY CERVICAL SEGMENT:  Normal origin  The vessel is normal in caliber throughout the neck  RIGHT EXTRACRANIAL CAROTID SEGMENT:  Normal caliber common carotid artery  Calcified plaque at the bifurcation and ICA origin  Severe (70-90%) stenosis at the ICA origin  Decreased caliber of the vessel throughout the cervical segment with intimal thickening  LEFT EXTRACRANIAL CAROTID SEGMENT:  Normal caliber common carotid artery  Calcified plaque the bifurcation and ICA origin resulting in mild to moderate (50-60%) stenosis  NASCET criteria was used to determine the degree of internal carotid artery diameter stenosis  INTRACRANIAL VASCULATURE INTERNAL CAROTID ARTERIES:  Diminutive right carotid siphon likely secondary to severe proximal stenosis  Calcified plaque throughout the left carotid siphon without significant stenosis  Patent ophthalmic arteries  ANTERIOR CIRCULATION:  Absent or severely hypoplastic right A1 segment  Anterior to indicating artery visualized  No stenosis in the anterior cerebral arteries  MIDDLE CEREBRAL ARTERY CIRCULATION: Absence of opacification of the proximal right middle cerebral artery  Minimal enhancement of distal M3 branches suggesting poor collateral flow  No stenosis in the proximal right middle cerebral artery  DISTAL VERTEBRAL ARTERIES:  Normal distal vertebral arteries    Posterior inferior cerebellar artery origins are normal  Normal vertebral basilar junction  BASILAR ARTERY:  Basilar artery is normal in caliber  Normal superior cerebellar arteries  POSTERIOR CEREBRAL ARTERIES: Both posterior cerebral arteries arises from the basilar tip  Both arteries demonstrate normal enhancement  Tiny bilateral posterior communicating arteries  DURAL VENOUS SINUSES:  Limited evaluation without evidence of or thrombosis  NON VASCULAR ANATOMY BONY STRUCTURES:  No acute osseous abnormality  SOFT TISSUES OF THE NECK:  Normal  THORACIC INLET:  Unremarkable  Impression: 1  Severe (70-90%) stenosis at the origin of the right internal carotid artery  Remainder of the vessel is diminutive likely secondary to proximal stenosis  2   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  Very minimal opacification of distal M3 branches suggesting poor collateral flow  I personally discussed this study with Yashira Sprague on 2019 at 7:14 PM  Workstation performed: [id number]        EKG / Monitor: Personally reviewed      AFib    Cardiac testing:   Results for orders placed during the hospital encounter of 19   Echo complete with contrast if indicated    Narrative [address]  [address] [address]  [phone]    Transthoracic Echocardiogram  2D, M-mode, Doppler, and Color Doppler    Study date:  2019    Patient: Trey Burnette  MR number: [id number]  Account number: [de-identified]  : 1935  Age: 81 years  Gender: Male  Status: Inpatient  Location: Bedside  Height: 66 in 66 in  Weight: 177 5 lb 177 5 lb  BP: 92/ 54 mmHg    Indications: Cerebral Thrombosis    Diagnoses: I67 9 - Cerebrovascular disease, unspecified    Sonographer:  ABI Whyte  Primary Physician:  Estefanía Beach MD  Referring Physician:  WYATT Lala  Group:  [facility]  Interpreting Physician:  Steven Huddleston MD    SUMMARY    PROCEDURE INFORMATION:  This was a technically difficult study  Echocardiographic views were limited due to restricted patient mobility, poor patient compliance, poor acoustic window availability, and lung interference  LEFT VENTRICLE:  Ejection fraction was estimated to be around 50 %  Although no diagnostic regional wall motion abnormality was identified, this possibility cannot be completely excluded on the basis of this study  Wall thickness was mildly increased  There was mild concentric hypertrophy  Doppler parameters were consistent with abnormal left ventricular relaxation (grade 1 diastolic dysfunction)  ATRIAL SEPTUM:  No Large ASD or large PFO identified by buuble study  Buuble study over all limited  MITRAL VALVE:  There was mild regurgitation  AORTIC VALVE:  Transaortic velocity was increased due to valvular stenosis  There was mild to moderate stenosis  peak velocity 2 33 m/se, Peak gradient 23, mean 11 mm of Hg and DENZEL around 1 35 to 1 4 cm2  TRICUSPID VALVE:  There was mild regurgitation  Estimated peak PA pressure was 35 mmHg  HISTORY: PRIOR HISTORY: Neuralgia, Colon Cancer    PROCEDURE: The procedure was performed at the bedside  This was a routine study  The transthoracic approach was used  The study included complete 2D imaging, M-mode, complete spectral Doppler, and color Doppler  The heart rate was 64 bpm,  at the start of the study  Intravenous contrast (agitated saline, 10 ml) was administered to evaluate shunting  Intravenous contrast ( 10 ml) was administered  Echocardiographic views were limited due to restricted patient mobility, poor  patient compliance, poor acoustic window availability, and lung interference  This was a technically difficult study  LEFT VENTRICLE: Size was normal  Ejection fraction was estimated to be around 50 %  Although no diagnostic regional wall motion abnormality was identified, this possibility cannot be completely excluded on the basis of this study   Wall  thickness was mildly increased  There was mild concentric hypertrophy  DOPPLER: Doppler parameters were consistent with abnormal left ventricular relaxation (grade 1 diastolic dysfunction)  RIGHT VENTRICLE: The size was normal  Systolic function was normal     LEFT ATRIUM: Size was normal     ATRIAL SEPTUM: No Large ASD or large PFO identified by buuble study  Buuble study over all limited  RIGHT ATRIUM: Size was normal     MITRAL VALVE: There was normal leaflet separation  DOPPLER: The transmitral velocity was within the normal range  There was no evidence for stenosis  There was mild regurgitation  AORTIC VALVE: The valve was probably trileaflet  Leaflets exhibited mildly increased thickness, mild calcification, and normal cuspal separation  DOPPLER: Transaortic velocity was increased due to valvular stenosis  There was mild to  moderate stenosis  peak velocity 2 33 m/se, Peak gradient 23, mean 11 mm of Hg and DENZEL around 1 35 to 1 4 cm2  There was no regurgitation  TRICUSPID VALVE: DOPPLER: There was mild regurgitation  Estimated peak PA pressure was 35 mmHg  PULMONIC VALVE: DOPPLER: There was no significant regurgitation  PERICARDIUM: There was no thickening or calcification  There was no pericardial effusion  AORTA: The root exhibited normal size  SYSTEMIC VEINS: IVC: The inferior vena cava was not well visualized      SYSTEM MEASUREMENT TABLES    2D mode  AoR Diam 2D: 3 6 cm  LA Diam (2D): 4 3 cm  LA/Ao (2D): 1 19  FS (2D Teich): 22 4 %  IVSd (2D): 1 24 cm  LVDEV: 124 cmï¾³  LVESV: 68 3 cmï¾³  LVIDd(2D): 5 1 cm  LVISd (2D): 3 96 cm  LVOT Area 2D: 3 14 cmï¾²  LVPWd (2D): 1 19 cm  SV (Teich): 55 7 cmï¾³    Apical four chamber  LVEF A4C: 45 %    Unspecified Scan Mode  DENZEL Cont Eq (Peak Patrick): 1 23 cmï¾²  DENZEL Cont Eq (VTI): 1 35 cmï¾²  LVOT (VTI): 18 8 cm  LVOT Diam : 2 cm  LVOT Vmax: 928 mm/s  LVOT Vmax; Mean: 902 mm/s  Peak Grad ; Mean: 3 mm[Hg]  SV (LVOT): 62 cmï¾³  VTI;Mean: 2 mm[Hg]  MV Peak A Patrick: 607 mm/s  MV Peak E Patrick  Mean: 408 mm/s  MVA (PHT): 3 67 cmï¾²  PHT: 60 ms  Max P mm[Hg]  V Max: 2780 mm/s  Vmax: 2780 mm/s  RA Area: 19 8 cmï¾²  RA Volume: 58 5 cmï¾³  TAPSE: 2 1 cm    [employer] Accredited [facility]    Prepared and electronically signed by    Kailyn Hollins MD  Signed 2019 11:00:17           Antoine Max DO      "This note has been constructed using a voice recognition system  Therefore there may be syntax, spelling, and/or grammatical errors   Please call if you have any questions  "

## 2019-07-25 NOTE — SPEECH THERAPY NOTE
Speech Language/Pathology    Speech/Language Pathology Progress Note    Patient Name: Jefferson Johnston  FLCMR'X Date: 7/25/2019     Subjective:  Patient seen for follow up dysphagia treatment  Family present upon my arrival  PEG not yet placed as patient is not medically stable  Currently patient with dobhoff in place however feeding put on hold as it was noted to be coiled in esophagus  Spoke with Dr Tyler Ames after my visit who reviewed repeat CXR and verbally indicated that patient may begin feeding  Objective:  Patient asleep upon my arrival but awoke given verbal stimuli  Patient agreeable to po  Patient accepted x1 1/2 tsp of puree with mildly delayed swallow with lingual pumping and reduced HLE  Patient with delayed coughing and refused further po  Possible retention given the presence of dobhoff? Education was provided on results of VBSS, recommendations and POC  Reciprocal comprehension was verbally expressed  Assessment:  Patients oropharyngeal swallow function likely unchanged however limited assessment today and given presence of dobhoff likely increased risk of aspiration       Plan/Recommendations:  Continue NPO with tube feeding  Meds via NG    Aspiration precautions  Will continue to follow    ELENA Faye , Pranav  Pathologist   [id number]

## 2019-07-25 NOTE — PROGRESS NOTES
Progress Note - Nicole Coon 81 y o  male MRN: [id number]    Unit/Bed#: [address] [place] [address] Encounter: [id number]    Assessment and Plan:   Principal Problem:    Cerebrovascular accident (CVA) due to thrombosis of right middle cerebral artery ([facility] [address] )  Active Problems:    Insomnia    Traumatic rhabdomyolysis ([facility] [address] )    Depression with anxiety    Right hip pain    Urinary retention    Impaired swallowing    Atrial fibrillation with RVR (HCC)    Symptomatic stenosis of right carotid artery    Respiratory distress    #1  Oropharyngeal dysphagia secondary to CVA with aspiration and protein calorie malnutrition: On heparin drip for a fib  -Will eventually need PEG tube placed when more stable  WBC increased again today   -Nutrition consult for tube feed recs  -Will need heparin held at least 4 hours prior to PEG placement  -Keofed placed at beside yesterday  Appears to be in stomach but is likely coiled in esophagus, repeat CXR and KUB to assess, may need to be repositioned prior to tube feeding      ----------------------------------------------------------------------------------------------------------------    Subjective:     Patient has no complaints today    Objective:     Vitals: Blood pressure 168/87, pulse 77, temperature 97 5 °F (36 4 °C), temperature source Tympanic, resp  rate (!) 26, height 5' 6" (1 676 m), weight 78 5 kg (173 lb 1 6 oz), SpO2 97 %  ,Body mass index is 24 84 kg/m²        Intake/Output Summary (Last 24 hours) at 7/25/2019 8294  Last data filed at 7/25/2019 0424  Gross per 24 hour   Intake 999 ml   Output 1350 ml   Net -351 ml       Physical Exam:     General Appearance: Alert, appears stated age and cooperative; chronically ill appearing  Lungs: decreased breath sounds bilaterally, no rales or rhonchi, no labored breathing/accessory muscle use  Heart: Regular rate, irregularly irregular rhythm S1, S2 normal, no murmur, click, rub or gallop  Abdomen: Soft, non-tender, non-distended; bowel sounds normal; no masses or no organomegaly; large left sided abdominal wall hernia above umbilicus  Extremities: No cyanosis, clubbing, or edema    Invasive Devices     Peripheral Intravenous Line            Peripheral IV 07/21/19 Right;Dorsal (posterior) Forearm 4 days          Drain            Urethral Catheter Non-latex 16 Fr  6 days                Lab Results:  Results from last 7 days   Lab Units 07/25/19  0402 07/24/19  1042   WBC Thousand/uL 16 58* 14 63*   HEMOGLOBIN g/dL 11 1* 11 3*   HEMATOCRIT % 33 6* 35 4*   PLATELETS Thousands/uL 310 305   NEUTROS PCT %  --  75   LYMPHS PCT %  --  9*   LYMPHO PCT % 11*  --    MONOS PCT %  --  8   MONO PCT % 12  --    EOS PCT % 1 1     Results from last 7 days   Lab Units 07/25/19  0402  07/20/19  0430   POTASSIUM mmol/L 3 8   < > 3 4*   CHLORIDE mmol/L 105   < > 110*   CO2 mmol/L 23   < > 23   BUN mg/dL 16   < > 17   CREATININE mg/dL 0 98   < > 1 07   CALCIUM mg/dL 8 5   < > 7 9*   ALK PHOS U/L  --   --  76   ALT U/L  --   --  35   AST U/L  --   --  74*    < > = values in this interval not displayed  Imaging Studies: I have personally reviewed pertinent imaging studies  Xr Chest Portable    Result Date: 7/22/2019  Impression: Diminished left basilar opacity likely representing atelectasis or small effusion  Workstation performed: [id number]     Xr Chest Portable    Result Date: 7/21/2019  Impression: Small left-sided pleural effusion  Workstation performed: [id number]     Xr Chest Portable    Result Date: 7/19/2019  Impression: No acute cardiopulmonary disease  See comments  Workstation performed: [id number]     Xr Chest 1 View Portable    Result Date: 7/18/2019  Impression: No acute cardiopulmonary disease  Workstation performed: [id number]     Ct Head Wo Contrast    Result Date: 7/20/2019  Impression: 1  Evolving right MCA territory infarction with petechial hemorrhage in the right basal ganglia, stable   2   Cerebral atrophy with chronic small vessel ischemic change  Workstation performed: [id number]     Mri Brain Wo Contrast    Result Date: 7/18/2019  Impression: Large acute right MCA territory infarction primarily involving the temporal lobe with lesser degree of involvement of the inferolateral frontal lobe and anterior parietal lobe  Consistent with CT/CTA Development of acute microhemorrhage within the right lenticular nucleus and adjacent temporal lobe Findings personally discussed by phone with Dr Dwain Mcclure at 3:10 PM, 7/18/2019 Workstation performed: [id number]     Ct Stroke Alert Brain    Result Date: 7/17/2019  Impression: 1  Acute to early subacute right MCA territory infarct involving greater than one third of the vascular territory  No evidence of hemorrhagic conversion or significant mass effect  2   Hyperdense right M1 and M2 branches suggesting acute thrombus  Findings were directly discussed with [place] Torres on 7/17/2019 7:16 PM  Workstation performed: [id number]     Mri Pelvis Bony Wo And W Contrast    Result Date: 7/19/2019  Impression: 1  Asymmetric hypertrophy and edema within the right gluteus jarrod, consider rhabdomyolysis 2  No fracture Workstation performed: [id number]     Xr Hips Bilateral 3-4 Vw W Pelvis If Performed    Result Date: 7/18/2019  Impression: Possible nondisplaced right superior pubic ramus fracture  Correlate for point tenderness  Workstation performed: [id number]     Cta Stroke Alert (head/neck)    Result Date: 7/17/2019  Impression: 1  Severe (70-90%) stenosis at the origin of the right internal carotid artery  Remainder of the vessel is diminutive likely secondary to proximal stenosis  2   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  Very minimal opacification of distal M3 branches suggesting poor collateral flow   I personally discussed this study with [place]ia Torres on 7/17/2019 at 7:14 PM  Workstation performed: [id number]

## 2019-07-25 NOTE — PROGRESS NOTES
Dobhoff coiled in esophagus on imaging, was repositioned at 65 cm at the nares, will repeat CXR/KUB at bedside to confirm positioning

## 2019-07-25 NOTE — CONSULTS
Consultation - Infectious Disease   Tito Servant 81 y o  male MRN: [id number]  Unit/Bed#: [address] [place] [address] Encounter: [id number]      IMPRESSION & RECOMMENDATIONS:   Impression/Recommendations: This is a 81 y o  male, with history of colon cancer, admitted on 07/17 with acute right-sided CVA secondary to right MCA occlusion  Patient developed respiratory distress over last few days, and now with fever and leukocytosis  CXR chest CT with left-sided pneumonia  1  Sepsis, developing over last 24-48 hours, with fever and leukocytosis  Source of sepsis is most likely pneumonia  Despite sepsis, patient remains systemically hemodynamically well, without toxicity or hypotension  Admission blood cultures have no growth  Antibiotic plan as in below  Monitor temperature/WBC  Monitor hemodynamics  2  Left-sided pneumonia  Given recent CVA and very debilitated bed-bound state at present, aspiration is likely  Given hospitalization over last 7 days, patient does have risk for resistant nosocomial pathogens, especially nosocomial GNR  Patient was started on cefepime/Flagyl earlier today  This is a reasonable regimen  Respiratory culture unobtainable secondary to patient's very weak nonproductive cough  Continue cefepime/Flagyl for now  Monitor temperature/WBC  Monitor respiratory symptoms  3  Acute hypoxic respiratory failure, multifactorial, secondary to generalized weakness/debility, poor secretion control and aspiration pneumonia above  Patient is currently on O2 support  O2 support per primary service  4  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  5  ELOISE on admission, secondary to rhabdomyolysis  Creatinine has normalized  Antibiotics at full dose  Monitor creatinine  Hospitalization records reviewed in detail  Discussed patient in detail regarding the above plan  Thank you for this consultation  We will follow along with you      HISTORY OF PRESENT ILLNESS:  Reason for Consult:  Sepsis  Pneumonia  HPI: Mariana Mercedes is a 81 y o  male, with history of colon cancer, was brought to the ER on 07/17 by EMS when he was found to be confused with left-sided weakness at home  Head and neck CT revealed right MCA occlusion with acute right-sided CVA  Patient was found not to be a candidate for invasive intervention  He was admitted and started on anticoagulation  On presentation, patient also had ELOISE and rhabdomyolysis  On admission, patient had leukocytosis but no fever  Leukocytosis resolved spontaneously  Hospitalization was complicated atrial fibrillation with RVR  A few days ago, patient developed respiratory distress with tachypnea  CXR that time only showed a small left pleural effusion but no infiltrates  For the last few days, patient started developing leukocytosis  Yesterday, patient developed low-grade fever  For these reasons, we are asked to evaluate the patient  At present, patient is sleepy but arousable  He answers simple question regarding his symptoms but remains disoriented  He does complain of dyspnea and mild weak cough  No abdominal pain  Patient repeat CXR today and chest CT with new left basilar infiltrate  Cefepime/Flagyl were started  REVIEW OF SYSTEMS:  A complete 12 point system-based review was done  Except for what is noted in HPI above, ROS of systems is otherwise negative  PAST MEDICAL HISTORY:  Past Medical History:   Diagnosis Date    Malignant neoplasm of descending colon ([facility] [address] ) 05/12/2006    Neuralgia     Last Assessed:6/25/13     No past surgical history on file  Problem list reviewed      FAMILY HISTORY:  Non-contributory    SOCIAL HISTORY:  Social History     Substance and Sexual Activity   Alcohol Use Not Currently    Frequency: Never    Binge frequency: Never     Social History     Substance and Sexual Activity   Drug Use No     Social History     Tobacco Use   Smoking Status Former Smoker   Smokeless Tobacco Never Used       ALLERGIES:  Allergies   Allergen Reactions    Hydrocodone        MEDICATIONS:  All current active medications have been reviewed  Patient is currently on cefepime/Flagyl  PHYSICAL EXAM:  Vitals:  Temp:  [97 5 °F (36 4 °C)-98 6 °F (37 °C)] 98 4 °F (36 9 °C)  HR:  [] 97  Resp:  [20-38] 38  BP: (126-175)/() 126/62  SpO2:  [93 %-100 %] 94 %  Temp (24hrs), Av 1 °F (36 7 °C), Min:97 5 °F (36 4 °C), Max:98 6 °F (37 °C)  Current: Temperature: 98 4 °F (36 9 °C)     Physical Exam:  General:  Acute and chronically ill-appearing comfortable, in no acute distress  Sleepy but arousable  When aroused, patient is disoriented but able to answer simple questions regarding his symptoms  Eyes:  Conjunctive clear with no hemorrhages or effusions  Oropharynx:  No ulcers, no lesions, pharynx benign, no tonsillitis  Neck:  Supple, no lymphadenopathy, no mass, nontender  Lungs:  Decreased breath sounds, no rales, no wheezing, no accessory muscle use  Cardiac:  Tachycardic with regular rhythm, normal S1, normal S2, no murmurs  Abdomen:  Soft, nondistended, non-tender, no HSM  Extremities:  Trace edema, no erythema, nontender  No ulcers  Skin:  No rashes, no ulcers  Neurological:  Not assessable    LABS, IMAGING, & OTHER STUDIES:  Lab Results:  I have personally reviewed pertinent labs  Results from last 7 days   Lab Units 19  0402 19  0150 19  0307  19  0430 19  0518   POTASSIUM mmol/L 3 8 4 1 3 7   < > 3 4* 3 7   CHLORIDE mmol/L 105 107 108   < > 110* 111*   CO2 mmol/L 23 23 24   < > 23 18*   BUN mg/dL 16 16 19   < > 17 19   CREATININE mg/dL 0 98 0 96 0 95   < > 1 07 1 21   EGFR ml/min/1 73sq m 72 73 74   < > 64 55   CALCIUM mg/dL 8 5 8 6 8 2*   < > 7 9* 7 7*   AST U/L  --   --   --   --  74* 72*   ALT U/L  --   --   --   --  35 24   ALK PHOS U/L  --   --   --   --  76 68    < > = values in this interval not displayed       Results from last 7 days   Lab Units 07/25/19  0402 07/24/19  1042 07/24/19  0150   WBC Thousand/uL 16 58* 14 63* 15 51*   HEMOGLOBIN g/dL 11 1* 11 3* 10 4*   PLATELETS Thousands/uL 310 305 274           Imaging Studies:   I have personally reviewed pertinent imaging study reports and images in PACS  CXR reviewed personally  LLL infiltrate  Chest CT reviewed personally  Small LLL infiltrate  Small left effusion  EKG, Pathology, and Other Studies:   I have personally reviewed pertinent reports

## 2019-07-25 NOTE — CONSULTS
Recommend Jevity 1 2 at goal of 60 mL/hour for a total volume of 1440 mL  This will provide 1728 kcals, 80 grams protein and 1162 mL  If IVF are d/c'd, recommend flushes of 120 mL q 4 hours for a total fluid volume of 1882 mL

## 2019-07-26 PROBLEM — Z51.5 HOSPICE CARE PATIENT: Status: ACTIVE | Noted: 2019-01-01

## 2019-07-26 NOTE — SOCIAL WORK
[facility]family  Cadence Magana to make her aware

## 2019-07-26 NOTE — SOCIAL WORK
[facility] Liaison informed CM & Dr Tc Temple she saw pt and family, pt meets IP Hospice level of care, pts spouse signed pt onto [address]

## 2019-07-26 NOTE — PLAN OF CARE
Problem: Potential for Falls  Goal: Patient will remain free of falls  Description  INTERVENTIONS:  - Assess patient frequently for physical needs  -  Identify cognitive and physical deficits and behaviors that affect risk of falls    -  [place] fall precautions as indicated by assessment   - Educate patient/family on patient safety including physical limitations  - Instruct patient to call for assistance with activity based on assessment  - Modify environment to reduce risk of injury  - Consider OT/PT consult to assist with strengthening/mobility  Outcome: Progressing     Problem: Prexisting or High Potential for Compromised Skin Integrity  Goal: Skin integrity is maintained or improved  Description  INTERVENTIONS:  - Identify patients at risk for skin breakdown  - Assess and monitor skin integrity  - Assess and monitor nutrition and hydration status  - Monitor labs (i e  albumin)  - Assess for incontinence   - Turn and reposition patient  - Assist with mobility/ambulation  - Relieve pressure over bony prominences  - Avoid friction and shearing  - Provide appropriate hygiene as needed including keeping skin clean and dry  - Evaluate need for skin moisturizer/barrier cream  - Collaborate with interdisciplinary team (i e  Nutrition, Rehabilitation, etc )   - Patient/family teaching  Outcome: Progressing

## 2019-07-26 NOTE — CONSULTS
Consultation - Pulmonary Medicine  Dylon Hatfield  81 y o  male MRN: [id number]  Unit/Bed#: [address] [place] [address] Encounter: [id number]    Assessment/Plan:    Acute hypoxemic respiratory failure secondary to aspiration pneumonitis/and left lobar pneumonia:  -patient is currently a DNR DNI  -currently being treated with antibiotic therapy with cefepime, vancomycin  -continue high-flow or high-flow nasal cannula  -titrate for oxygen saturations 90 94%  -in the setting of comfort care can provide oxygen for comfort  -scopolamine patch v  Glycopyrrolate for secretions    Dysphagia secondary to large right MCA stroke  -with ongoing aspiration  -likely for significant secondary illness  -risk for likelihood for recurrent pneumonitis and pneumonia    -this patient overall has a poor prognosis and we discussed this  -we discussed hospice and comfort care and the patient like to pursue this after discussing with his son for total familial conversational closure  -these discussions were had in the presence of the medicine team and in conjunction with Dr Devyn Borjas          Thank you for this consultation; we will be happy to follow with you     ______________________________________________________________________      History of Present Illness   Physician Requesting Consult: Jazzmine Garzon MD  Reason for Consult / Principal Problem:  Hypoxemia  HX and PE limited by:  Patient dysarthria    HPI:  Nicole Coon is a 81 y o  male  who presents with hypoxemic respiratory failure  He initially presented with a right-sided MCA stroke and was out of the tPA window and non candidate for reperfusion technique  He initially was admitted in the intensive care unit 1  He subsequently developed a rapid atrial fibrillation  Throughout his hospital stay he has also progressively dysphagic and having significant difficulty swallowing both secretions and liquids and solids      He has been evaluated by throughout this hospital stay by speech therapy and is found to be dysphagia requiring a puree solids and nectar thick liquids  He still continues to aspirate throughout his hospital stay  He also has become hypoxemic and dyspneic  Over the last day or so the patient has had 2 NG tube placement placed for attempted enteral feeding in anticipation of percutaneous gastrostomy, however, has become Recurrently hypoxemic with inability to come off BiPAP  Pulmonology was consulted regarding his progressive hypoxemic respiratory failure  At the bedside I discussed with the patient that he has significant aspiration is complication secondary to his acute stroke  We also discussed at length the likelihood of his clinical trajectory with both rehab, neurologic rehab, speech rehab, and the likelihood of complications secondary to his stroke and aspiration  At this time the patient is a DNR DNI and does not wish to have any heroic measures  In fact, he stated that he only wanted to have percutaneous gastrostomy in the event that it would clinically improve his clinical symptoms  We discussed that gastrostomy would only alleviate any type of aspiration that came as a result of swallowing food  This would not likely eliminate aspiration of the patient's normal oral salivary contents  This is all discussed in the presence of his daughter-in-law Anne Moore  The family as well as the patient requests that they discussed with his son Fan Florian  The patient indicates that he would like to move forward with comfort care with the full understanding that the meaning of this is abandonment of medical therapy for the sake of treatment with comfort medications such as anxiolytics and opiates with the anticipation of death in the nearly imminent future  He demonstrates full understanding of this  We discussed that BiPAP and high-flow nasal cannula are unable to clear the patient secretions and that he would likely need frequent oral suctioning      We discussed that the patient will have significant residual deficits secondary to his stroke  He indicates that he does not want to live like this  HPI    Smoking history: Former smoker quit many years  Occupational history:  No significant occupational exposures  Travel history:  No recent travel  Lives at home by himself      Review of Systems   Constitutional: Positive for fatigue  Negative for activity change, appetite change and fever  HENT: Negative for congestion, dental problem, drooling, postnasal drip, rhinorrhea, sinus pressure, sinus pain and sneezing  Eyes: Negative for visual disturbance  Respiratory: Positive for cough, shortness of breath and wheezing  Negative for apnea, choking, chest tightness and stridor  Cardiovascular: Negative for chest pain, palpitations and leg swelling  Gastrointestinal: Negative for abdominal pain, diarrhea, nausea and vomiting  Endocrine: Negative for cold intolerance and heat intolerance  Genitourinary: Negative for dysuria, flank pain and scrotal swelling  Musculoskeletal: Negative for arthralgias, joint swelling and myalgias  Skin: Negative for color change and rash  Allergic/Immunologic: Negative for environmental allergies  Neurological: Positive for weakness  Negative for dizziness, syncope and light-headedness  Hematological: Does not bruise/bleed easily  Psychiatric/Behavioral: Negative for confusion  Past Medical/Surgical History  Past Medical History:   Diagnosis Date    Malignant neoplasm of descending colon ([facility] [address] ) 05/12/2006    Neuralgia     Last Assessed:6/25/13     No past surgical history on file      Social History  Social History     Substance and Sexual Activity   Alcohol Use Not Currently    Frequency: Never    Binge frequency: Never     Social History     Substance and Sexual Activity   Drug Use No     Social History     Tobacco Use   Smoking Status Former Smoker   Smokeless Tobacco Never Used       Family History  No family history on file      Allergies  Allergies   Allergen Reactions    Hydrocodone        Home Meds:   Medications Prior to Admission   Medication Sig Dispense Refill Last Dose    mirtazapine (REMERON) 30 mg tablet Take 1 tablet (30 mg total) by mouth daily at bedtime 30 tablet 6 Past Week at Unknown time    QUEtiapine (SEROquel) 100 mg tablet Take 1 tablet (100 mg total) by mouth daily at bedtime 30 tablet 6 Past Week at Unknown time     Current Meds:   Scheduled Meds:  Current Facility-Administered Medications:  acetaminophen 325 mg Rectal Q4H PRN Davida Jean MD    aspirin 300 mg Rectal Daily Davida Jean MD    atorvastatin 40 mg Oral QPM Davida Jean MD    bisacodyl 10 mg Rectal Daily PRN Davida Jean MD    cefepime 2,000 mg Intravenous Q12H Davida Jean MD Last Rate: 2,000 mg (07/26/19 0129)   dextrose 5% lactated ringer's 75 mL/hr Intravenous Continuous Davida Jean MD    digoxin 125 mcg Intravenous Every Other Neta Kanner, MD    heparin (porcine) 300-2,000 Units/hr Intravenous Titrated Davida Jean MD Last Rate: 1,500 Units/hr (07/25/19 3426)   HYDROmorphone 0 2 mg Intravenous Q4H PRN Davida Jean MD    ipratropium 0 5 mg Nebulization Q6H Davida Jean MD    levalbuterol 1 25 mg Nebulization Tristan Marie MD    And        sodium chloride 3 mL Nebulization Tristan Marie MD    metoprolol 5 mg Intravenous Q6H Dieselma Perkins DO    metroNIDAZOLE 500 mg Intravenous Tristan Marie MD Last Rate: 500 mg (07/26/19 0529)   pneumococcal 13-valent conjugate vaccine 0 5 mL Intramuscular Prior to discharge Davida Jean MD      PRN Meds:  acetaminophen 325 mg Q4H PRN   bisacodyl 10 mg Daily PRN   HYDROmorphone 0 2 mg Q4H PRN   pneumococcal 13-valent conjugate vaccine 0 5 mL Prior to discharge       ____________________________________________________________________    Objective   Vitals:   Temp:  [98 1 °F (36 7 °C)-99 1 °F (37 3 °C)] 98 1 °F (36 7 °C)  HR:  [] 81  Resp:  [18-38] 24  BP: (111-158)/(71-64) 158/83  FiO2 (%): [40] 40  Weight (last 2 days)     Date/Time   Weight    07/26/19 0600   79 4 (175)    07/25/19 0540   78 5 (173 1)            Oxygen Therapy  SpO2: 97 %  O2 Flow Rate (L/min): 3 L/min    IV Infusions:     dextrose 5% lactated ringer's 75 mL/hr    heparin (porcine) 300-2,000 Units/hr Last Rate: 1,500 Units/hr (07/25/19 2336)       Nutrition:        Diet Orders   (From admission, onward)             Start     Ordered    07/18/19 0737  Room Service  Once     Question:  Type of Service  Answer:  Room Service - Appropriate with Assistance    07/18/19 0736                  Ins/Outs:   I/O       07/24 0701 - 07/25 0700 07/25 0701 - 07/26 0700 07/26 0701 - 07/27 0700    I V  (mL/kg) 999 (12 7)      NG/GT  120     Total Intake(mL/kg) 999 (12 7) 120 (1 5)     Urine (mL/kg/hr) 1650 (0 9) 1000 (0 5)     Total Output 1650 1000     Net -651 -880                    Lines/Drains:  Invasive Devices     Peripheral Intravenous Line            Peripheral IV 07/21/19 Right;Dorsal (posterior) Forearm 5 days    Peripheral IV 07/25/19 Left Antecubital less than 1 day          Drain            Urethral Catheter Non-latex 16 Fr  7 days                ____________________________________________________________________      Physical Exam   Constitutional: He is oriented to person, place, and time  He appears well-developed and well-nourished  No distress  HENT:   Head: Normocephalic and atraumatic  Eyes: Pupils are equal, round, and reactive to light  No scleral icterus  Neck: Normal range of motion  No tracheal deviation present  Cardiovascular: Normal rate, regular rhythm, normal heart sounds and intact distal pulses  Exam reveals no gallop and no friction rub  No murmur heard  Pulmonary/Chest: No accessory muscle usage or stridor  Tachypnea noted  He is in respiratory distress  He has decreased breath sounds in the right lower field and the left lower field   He has rhonchi in the right middle field, the right lower field, the left middle field and the left lower field  He has no rales  He exhibits no tenderness  Patient's rhonchorous lung sounds middle lower lung fields bilaterally    Presently patient was on BiPAP 12/5  40%   Abdominal: Soft  He exhibits no distension  There is no tenderness  There is no rebound and no guarding  Musculoskeletal: Normal range of motion  He exhibits no edema  Neurological: He is alert and oriented to person, place, and time  Skin: Skin is warm and dry  Psychiatric: He has a normal mood and affect        ____________________________________________________________________    Labs:   CBC: Results from last 7 days   Lab Units 07/26/19  0543 07/25/19  0402 07/24/19  1042   WBC Thousand/uL 16 30* 16 58* 14 63*   HEMOGLOBIN g/dL 10 3* 11 1* 11 3*   HEMATOCRIT % 31 1* 33 6* 35 4*   MCV fL 94 94 95   PLATELETS Thousands/uL 305 310 305     CMP: Results from last 7 days   Lab Units 07/26/19  0543 07/25/19  0402 07/24/19  0150  07/20/19  0430   POTASSIUM mmol/L 4 2 3 8 4 1   < > 3 4*   CHLORIDE mmol/L 104 105 107   < > 110*   CO2 mmol/L 21 23 23   < > 23   BUN mg/dL 21 16 16   < > 17   CREATININE mg/dL 1 12 0 98 0 96   < > 1 07   CALCIUM mg/dL 8 3 8 5 8 6   < > 7 9*   AST U/L 49*  --   --   --  74*   ALT U/L 38  --   --   --  35   ALK PHOS U/L 88  --   --   --  76   EGFR ml/min/1 73sq m 61 72 73   < > 64    < > = values in this interval not displayed  Magnesium:   Results from last 7 days   Lab Units 07/26/19  0543   MAGNESIUM mg/dL 1 9     Phosphorous:     Troponin:     PT/INR:   Results from last 7 days   Lab Units 07/26/19  0548   PTT seconds 79*     Lactic Acid:   Results from last 7 days   Lab Units 07/24/19  1917   LACTIC ACID mmol/L 1 2     BNP:     ABG:      Procalcitonin: Invalid input(s): PROCALCITONIN    Imaging:   XR abdomen 1 view kub   Final Result by Angelita Solis MD (07/26 4053)      Feeding tube present with its tip overlying the mid cervical region    This should be advanced through the esophagus into the stomach  Workstation performed: [id number]         XR chest 1 view   Final Result by Galilea Pang MD (07/25 1450)      No acute cardiopulmonary disease  Interval resolution of kinked feeding tube course  Tip overlying midline abdomen  Workstation performed: [id number]         XR abdomen 1 vw portable   Final Result by Christa Garcia MD (07/25 1503)      Feeding tube position as described  Workstation performed: [id number]         XR abdomen 1 view kub   Final Result by Roxann Bronson MD (07/25 2686)      1  Tip of the feeding tube is in the upper portion of the stomach   2  Curvilinear calcification of the distal aorta  Consider abdominal aortic ultrasound or abdominal CT to exclude the possibility of infrarenal abdominal aortic aneurysm               Workstation performed: [id number]         XR chest 1 view   Final Result by Roxann Bronson MD (07/25 0032)      As clinically suspected, curling of the feeding tube within the esophagus before distally extending below the left hemidiaphragm  Patchy airspace disease at left lung base as better evaluated on chest CT  The examination demonstrates a significant  finding and was documented as such in [facility] for liaison and referring practitioner notification  Workstation performed: [id number]         CT chest wo contrast   Final Result by Roxann Bronson MD (07/25 7696)      1  Motion limited examination but demonstrating patchy airspace disease within the left lower lobe and posterior portion of the left upper lobe most suggestive of pneumonia  Follow-up until resolution  2  Small left effusion   3  Curling of the feeding tube within the upper and mid esophagus prior to the tube extending below left hemidiaphragm  The examination demonstrates a significant  finding and was documented as such in [facility] for liaison and referring practitioner notification        Workstation performed: [id number]         XR abdomen 1 view kub   Final Result by Nan Mahoney MD (07/25 0513)      Feeding tube just beyond the EG junction in the proximal stomach, this can be advanced further into the stomach  Workstation performed: [id number]         XR chest 1 view   Final Result by Angelito Macedo MD (07/24 1002)      No acute cardiopulmonary disease  Workstation performed: [id number]         FL barium swallow video w speech   Final Result by LISANDRO MARTINEZ (07/23 1058)      XR chest portable   Final Result by Angelito Macedo MD (07/22 1158)      Diminished left basilar opacity likely representing atelectasis or small effusion  Workstation performed: [id number]         VAS carotid complete study   Final Result by Santi Ya MD (07/24 1707)      XR chest portable   Final Result by Nan Mahoney MD (07/21 3260)      Small left-sided pleural effusion  Workstation performed: [id number]         CT head wo contrast   Final Result by Alison Dunbar DO (07/20 1123)   1  Evolving right MCA territory infarction with petechial hemorrhage in the right basal ganglia, stable  2   Cerebral atrophy with chronic small vessel ischemic change  Workstation performed: [id number]         MRI pelvis bony wo and w contrast   Final Result by Justo Soto MD (07/19 1947)      1  Asymmetric hypertrophy and edema within the right gluteus jarrod, consider rhabdomyolysis   2  No fracture               Workstation performed: [id number]         XR chest portable   Final Result by Can Hitchcock DO (07/19 0940)      No acute cardiopulmonary disease  See comments  Workstation performed: [id number]         XR hips bilateral 3-4 vw w pelvis if performed   Final Result by Juan Mireles DO (07/18 0927)      Possible nondisplaced right superior pubic ramus fracture  Correlate for point tenderness              Workstation performed: [id number]         MRI brain wo contrast   Final Result by Camila Patel MD (07/18 1517)   Large acute right MCA territory infarction primarily involving the temporal lobe with lesser degree of involvement of the inferolateral frontal lobe and anterior parietal lobe  Consistent with CT/CTA      Development of acute microhemorrhage within the right lenticular nucleus and adjacent temporal lobe      Findings personally discussed by phone with Dr Ana Roa at 3:10 PM, 7/18/2019            Workstation performed: [id number]         XR chest 1 view portable   Final Result by Herman Sanderson MD (07/18 9689)      No acute cardiopulmonary disease  Workstation performed: [id number]         CTA stroke alert (head/neck)   Final Result by Janet Villafana MD (07/17 1922)         1  Severe (70-90%) stenosis at the origin of the right internal carotid artery  Remainder of the vessel is diminutive likely secondary to proximal stenosis  2   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  Very minimal opacification of distal M3 branches suggesting poor collateral flow  I personally discussed this study with Vilma Estrella on 7/17/2019 at 7:14 PM                            Workstation performed: [id number]         CT stroke alert brain   Final Result by Janet Villafana MD (07/17 1922)         1  Acute to early subacute right MCA territory infarct involving greater than one third of the vascular territory  No evidence of hemorrhagic conversion or significant mass effect  2   Hyperdense right M1 and M2 branches suggesting acute thrombus  Findings were directly discussed with Vilma Estrella on 7/17/2019 7:16 PM       Workstation performed: [id number]                 Micro: Lab Results   Component Value Date    BLOODCX No Growth After 5 Days  07/17/2019    BLOODCX No Growth After 5 Days   07/17/2019    MRSACULTURE  07/17/2019     No Methicillin Resistant Staphlyococcus aureus (MRSA) isolated        ____________________________________________________________________      Code Status: Level 3 - DNAR and DNI

## 2019-07-26 NOTE — SOCIAL WORK
SW following to monitor needs and assist as needed  Pt was placed on inpatient hospice care with [facility] [address] [facility] today  SW met with pt and stepdaughter to offer support  SW will continue to follow to support and assist as needed

## 2019-07-26 NOTE — ASSESSMENT & PLAN NOTE
Large MCA infarct with microhemorrhages  Patient was treated aggressively but was failing management  Previously was completely independent    Hospice care

## 2019-07-26 NOTE — PHYSICAL THERAPY NOTE
Pt cancelled for PT/OT this PM due to pt not feeling well  Will follow    Mikayla Salvador, [place] [id number]

## 2019-07-26 NOTE — PROGRESS NOTES
Progress Note - Cardiology     Jessy Youngblood Sr 81 y o  male MRN: [id number]  : 1935  Unit/Bed#: [address] [place] [address] Encounter: [id number]    Assessment and Plan:     1  Atrial fibrillation with rapid ventricular rate     · Likely new onset  Possible history of paroxysmal atrial fibrillation  (Infrequent medical care )  · Rate controlled w/ Digoxin 125 mcg qd, continue Lopressor 5 mg q6h IV (hold if HR<60 BPM)  · Goal heart EHSL <354 keep systolic -455  · Cardizem gtt D/C-ed (-) 2/2 hypotension (113/81) and permissive htn recommendations      2   Sepsis likely 2/2 pneumonia with concomitant UTI  · Persistent leukocytosis noted this morning 15 51->14 63->16 58->16 30  · Remains febrile, tachypnic, tachycardia (Afib)  · Chest CT shows patch CS space disease in left lower low and posterior left upper lobe like the secondary to pneumonia  Follow-up for resolution recommended per Radiology  · Day #2 broad spectrum antibiotics initiated  Cefepime 2g q12h IV & Flagyl 500mg q8h   · Lactic acid 1 2 on   · Procal 0 27 on , repeat pending  · C-Xray showed no acute cardiopulmonary disease  · Repeat UA showed positive nitrate, moderate leukocyte, WBC, RBC, innumerable bacteria, + protein  · Consider ordering urine culture, although patient had a Cohen catheter placed this admission  · Blood culture: no growth after 5d  · Monitor closely     3  CVA  · 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     · Anticipated outpatient CEA per vascular surgery  · Right carotid stenosis, complete vascular study ordered  · Continue w/ Heparin gtt and aspirin 300mg qd rectally  · Neurology consulted, see recommendation    · FL barium video study done on : Speech and swallow therapy recommended advancing diet to pureed, but patient did not tolerate well per chart review   The C remains NPO  · PEG tube indicated, patient agrees but had to be deferred in light of sepsis   · S/p NG tube placement and removal likely secondary to improper positioning on 7/24  · Continue PT/OT, anticipated D/C to STR      4  Dyslipidemia      · Currently on Lipitor 40 mg, consider increasing to Lipitor 80 mg per Neuro recommendation      5  Right hip pain     · Seen by Ortho  No obvious pelvis fracture   Continue current pain management  · MRI shows asymmetric hypertrophy and edema within the right gluteus jarrod, consider rhabdomyolysis      6  Traumatic rhabdomyolysis     · Patient was lying on the floor for prolonged period of time     · Total CK 3,324->850   Monitor electrolytes  Continue gentle IV hydration  EF around 50%        7  Respiratory distress   · Persistent tachpnea, s/p chest therapy and Lasix 20mg IV x1 7/19-22  · Continue w/ O2 via NC  · Continue BiPAP 12/5 q h s  · ABG ordered by primary team   · Currently NPO per speech therapy  · Xopenex PRN     8   History of acute kidney injury     · Serum creatinine now improving  Cr 1 21->0 95->0 98-> 1 12    Subjective / Objective:     Patient seen examined at bedside  Patient still on BiPAP, daughter present at bedside  NG tube was place on subsequently removed yesterday  No acute overnight events reported  Patient is tolerating broad-spectrum antibiotics well  Vitals: Blood pressure 158/83, pulse 81, temperature 98 1 °F (36 7 °C), temperature source Tympanic, resp  rate (!) 24, height 5' 6" (1 676 m), weight 79 4 kg (175 lb), SpO2 97 %  Vitals:    07/25/19 0540 07/26/19 0600   Weight: 78 5 kg (173 lb 1 6 oz) 79 4 kg (175 lb)     Body mass index is 25 11 kg/m²    BP Readings from Last 3 Encounters:   07/26/19 158/83   12/18/18 120/70   02/13/18 130/80     Orthostatic Blood Pressures      Most Recent Value   Blood Pressure  158/83 filed at 07/26/2019 0800   Patient Position - Orthostatic VS  Lying filed at 07/26/2019 0800        I/O       07/24 0701 - 07/25 0700 07/25 0701 - 07/26 0700 07/26 0701 - 07/27 0700    I V  (mL/kg) 999 (12 7)      NG/GT  120     Total Intake(mL/kg) 999 (12 7) 120 (1 5)     Urine (mL/kg/hr) 1650 (0 9) 1000 (0 5)     Total Output 1650 1000     Net -651 -880                Invasive Devices     Peripheral Intravenous Line            Peripheral IV 07/21/19 Right;Dorsal (posterior) Forearm 5 days    Peripheral IV 07/25/19 Left Antecubital less than 1 day          Drain            Urethral Catheter Non-latex 16 Fr  7 days                  Intake/Output Summary (Last 24 hours) at 7/26/2019 0947  Last data filed at 7/26/2019 0301  Gross per 24 hour   Intake 120 ml   Output 1000 ml   Net -880 ml         Physical Exam:     Neurologic:  AAO x 3, right sided gaze, left sided weakness  Constitutional:  Well developed, well nourished, NAD  HENT:  AT/NC  Respiratory:  On BiPAP, no respiratory distress   Decreased breath sounds especially left lower quadrant  Cardiovascular: S1-S2 regular, irregularly irregular, no murmur  GI:  Soft, nondistended, hypoactive bowel sounds, nontender, surgical hernia noted  Musculoskeletal:  Atraumatic, no edema, normal dorsalis pedis pulse b/l  Psychiatric:  Speech and behavior appropriate     Medications/ Allergies:     Current Facility-Administered Medications:  acetaminophen 325 mg Rectal Q4H PRN Aury Rodriguez MD    aspirin 325 mg Per NG Tube Daily Aury Rodriguez MD    atorvastatin 40 mg Per NG Tube QPM Aury Rodriguez MD    bisacodyl 10 mg Rectal Daily PRN Aury Rodriguez MD    cefepime 2,000 mg Intravenous Q12H Aury Rodriguez MD Last Rate: 2,000 mg (07/26/19 0129)   digoxin 125 mcg Intravenous Every Other Dey MD Rosie    heparin (porcine) 300-2,000 Units/hr Intravenous Titrated Aury Rodriguez MD Last Rate: 1,500 Units/hr (07/25/19 2336)   HYDROmorphone 0 2 mg Intravenous Q4H PRN Aury Rodriguez MD    ipratropium 0 5 mg Nebulization Q6H Aury Rodriguez MD    levalbuterol 1 25 mg Nebulization Q6H Aury Rodriguez MD    And        sodium chloride 3 mL Nebulization Q6H Aury Rodriguez MD    metoprolol 5 mg Intravenous Q6H Delia Perkins DO    metroNIDAZOLE 500 mg Intravenous Enoc Che MD Last Rate: 500 mg (07/26/19 0534)   pneumococcal 13-valent conjugate vaccine 0 5 mL Intramuscular Prior to discharge Crow Trivedi MD        acetaminophen 325 mg Q4H PRN   bisacodyl 10 mg Daily PRN   HYDROmorphone 0 2 mg Q4H PRN   pneumococcal 13-valent conjugate vaccine 0 5 mL Prior to discharge     Allergies   Allergen Reactions    Hydrocodone        VTE Pharmacologic Prophylaxis:   Heparin    Labs:   Troponins:  Results from last 7 days   Lab Units 07/26/19  0543 07/25/19  1222 07/24/19  0150   CK TOTAL U/L 214 263 678*   CK MB INDEX % <1 0 <1 0 <1 0       CBC with diff:  Results from last 7 days   Lab Units 07/26/19  0543 07/25/19  0402 07/24/19  1042 07/24/19  0150 07/23/19  0307 07/22/19  0631 07/21/19  0624 07/20/19  0430   WBC Thousand/uL 16 30* 16 58* 14 63* 15 51* 9 65 8 31 9 80 8 20   HEMOGLOBIN g/dL 10 3* 11 1* 11 3* 10 4* 10 8* 10 8* 11 2* 10 3*   HEMATOCRIT % 31 1* 33 6* 35 4* 33 0* 33 2* 33 7* 34 5* 31 8*   MCV fL 94 94 95 97 95 96 95 95   PLATELETS Thousands/uL 305 310 305 274 270 232 246 172   MCH pg 31 0 31 2 30 5 30 6 30 9 30 6 30 7 30 7   MCHC g/dL 33 1 33 0 31 9 31 5 32 5 32 0 32 5 32 4   RDW % 16 4* 15 9* 15 9* 16 1* 16 1* 16 3* 15 8* 16 2*   MPV fL 10 2 9 2 9 7 11 8 9 2 9 6 9 6 9 6   NRBC AUTO /100 WBCs  --  0 0  --  0 0 0 0       CMP:  Results from last 7 days   Lab Units 07/26/19  0543 07/25/19  0402 07/24/19  0150 07/23/19  0307 07/22/19  0631 07/21/19  0624 07/20/19  0430   SODIUM mmol/L 136 136 139 142 142 139 144   POTASSIUM mmol/L 4 2 3 8 4 1 3 7 3 7 3 8 3 4*   CHLORIDE mmol/L 104 105 107 108 110* 107 110*   CO2 mmol/L 21 23 23 24 22 19* 23   ANION GAP mmol/L 11 8 9 10 10 13 11   BUN mg/dL 21 16 16 19 20 19 17   CREATININE mg/dL 1 12 0 98 0 96 0 95 0 96 1 02 1 07   CALCIUM mg/dL 8 3 8 5 8 6 8 2* 8 7 8 9 7 9*   AST U/L 49*  --   --   --   --   --  74*   ALT U/L 38  --   --   --   --   --  35   ALK PHOS U/L 88  --   --   --   --   --  76   TOTAL PROTEIN g/dL 6 4  --   --   --   --   --  6 1*   ALBUMIN g/dL 2 2*  --   --   --   --   --  2 7*   TOTAL BILIRUBIN mg/dL 0 70  --   --   --   --   --  0 90   EGFR ml/min/1 73sq m 61 72 73 74 73 68 64       Magnesium:  Results from last 7 days   Lab Units 07/26/19  0543 07/25/19  0402 07/24/19  0150 07/23/19  0307 07/22/19  0631 07/21/19  0624 07/20/19  0430   MAGNESIUM mg/dL 1 9 1 8 2 0 2 0 2 4 2 3 2 0     Coags:  Results from last 7 days   Lab Units 07/26/19  0548 07/25/19  1222 07/25/19  0402 07/24/19  1917 07/24/19  1042 07/24/19  0149 07/23/19  1811   PTT seconds 79* 78* 70* 55* 30 36* 30     TSH:    Lipid Profile:    Hgb A1c:    NT-proBNP: No results for input(s): NTBNP in the last 72 hours  Imaging & Testing   I have personally reviewed pertinent reports  Xr Chest Portable    Result Date: 7/22/2019  Narrative: CHEST INDICATION:   Follow-up, shortness of breath cough  COMPARISON:  7/20/2019  EXAM PERFORMED/VIEWS:  XR CHEST PORTABLE FINDINGS: Heart shadow appears unremarkable  Atherosclerotic vascular calcifications are noted  Diminished left basilar opacity compatible with atelectasis or small effusion  No pneumothorax  Osseous structures appear within normal limits for patient age  Impression: Diminished left basilar opacity likely representing atelectasis or small effusion  Workstation performed: [id number]     Xr Chest Portable    Result Date: 7/21/2019  Narrative: CHEST INDICATION:   Tachypnea  COMPARISON:  07/19/2019 EXAM PERFORMED/VIEWS:  XR CHEST PORTABLE 1 image FINDINGS: Cardiomediastinal silhouette appears enlarged  Pulmonary vessels are normal   The lungs are clear  No pneumothorax  Small left-sided pleural effusion  Osseous structures appear within normal limits for patient age  Impression: Small left-sided pleural effusion  Workstation performed: [id number]     Xr Chest Portable    Result Date: 7/19/2019  Narrative: CHEST INDICATION:   Shortness of breath   COMPARISON:  7/17/2019 EXAM PERFORMED/VIEWS:  XR CHEST PORTABLE Images: 2 (duplicate image re-windowed with line enhancement technique) FINDINGS: Cardiomediastinal silhouette appears stable  Calcified uncoiled thoracic aorta  Mild prominence of the ascending aortic contour which could represent tortuosity  CT imaging would be required to evaluate aortic size if clinically relevant  The lungs are grossly clear  Slight elevation of the right diaphragm  No pneumothorax or pleural effusion  Osseous structures appear within normal limits for patient age  Surgical clips right upper quadrant  Impression: No acute cardiopulmonary disease  See comments  Workstation performed: [id number]     Xr Chest 1 View Portable    Result Date: 7/18/2019  Narrative: CHEST INDICATION:   htn  Stroke COMPARISON:  None EXAM PERFORMED/VIEWS:  XR CHEST PORTABLE 1 image FINDINGS: Cardiomediastinal silhouette appears unremarkable  The lungs are clear  No pneumothorax or pleural effusion  Osseous structures appear within normal limits for patient age  Impression: No acute cardiopulmonary disease  Workstation performed: [id number]     Xr Abdomen 1 View Kub    Result Date: 7/26/2019  Narrative: ABDOMEN INDICATION:   NG tube positioning check  COMPARISON:  Abdominal film performed the previous day  VIEWS:  AP supine Images: 2 FINDINGS: There is a nonobstructive bowel gas pattern  A feeding tube is present with its tip overlying the mid cervical region  No discernible free air on this supine study  Upright or left lateral decubitus imaging is more sensitive to detect subtle free air in the appropriate setting  No pathologic calcifications or soft tissue masses  Visualized lung bases are clear  Visualized osseous structures are unremarkable for the patient's age  Impression: Feeding tube present with its tip overlying the mid cervical region  This should be advanced through the esophagus into the stomach   Workstation performed: [id number]     Xr Abdomen 1 View Kub    Result Date: 7/25/2019  Narrative: ABDOMEN INDICATION:   check for looping of dobhoff, check location  COMPARISON:  7/24/2019 VIEWS:  AP supine FINDINGS: Limited study demonstrates no evidence of intestinal obstruction Tip of the feeding tube is in the upper portion of the stomach Residual contrast agent is seen within nondistended colon  Surgical clips present, right upper quadrant  Curvilinear calcification of the distal aorta noted Visualized osseous structures are unremarkable for the patient's age  Impression: 1  Tip of the feeding tube is in the upper portion of the stomach 2  Curvilinear calcification of the distal aorta  Consider abdominal aortic ultrasound or abdominal CT to exclude the possibility of infrarenal abdominal aortic aneurysm Workstation performed: [id number]     Xr Abdomen 1 View Kub    Result Date: 7/25/2019  Narrative: ABDOMEN INDICATION:   check location of Dobbhoff tube  COMPARISON:  None VIEWS:  AP supine Images: 2 FINDINGS: There is a nonobstructive bowel gas pattern  There is residual barium within the colon  A feeding tube is present just beyond the EG junction in the proximal stomach  This can be advanced into the stomach  No discernible free air on this supine study  Upright or left lateral decubitus imaging is more sensitive to detect subtle free air in the appropriate setting  No pathologic calcifications or soft tissue masses  Visualized lung bases are clear  Visualized osseous structures are unremarkable for the patient's age  Impression: Feeding tube just beyond the EG junction in the proximal stomach, this can be advanced further into the stomach  Workstation performed: [id number]     Ct Head Wo Contrast    Result Date: 7/20/2019  Narrative: CT BRAIN - WITHOUT CONTRAST INDICATION:   Intracranial hemorrhage; Stroke; Follow-up prior to starting anticoagulation   COMPARISON:  MR brain July 18, 2019, CTA stroke alert July 17, 2019 and CT brain July 17, 2019  TECHNIQUE:  CT examination of the brain was performed  In addition to axial images, coronal 2D reformatted images were created and submitted for interpretation  Radiation dose length product (DLP) for this visit:  997 84 mGy-cm   This examination, like all CT scans performed in the [facility], was performed utilizing techniques to minimize radiation dose exposure, including the use of iterative  reconstruction and automated exposure control  IMAGE QUALITY:  Diagnostic  FINDINGS: PARENCHYMA:  Again identified is a hyperdense right middle cerebral artery  There is an associated right MCA territory infarction  Increased density in the right basal ganglia, most compatible with petechial hemorrhage  There is mild mass effect with sulcal effacement  Midline shift of approximately 3 mm to the left  This has not significantly changed  Compression of the right lateral ventricle  Decreased attenuation in the periventricular regions and centrum semiovale bilaterally, consistent with chronic small vessel ischemic change  Prominence of the extra-axial space adjacent to the anterior left temporal lobe, most compatible with arachnoid cyst  VENTRICLES AND EXTRA-AXIAL SPACES:  Stable mild mass effect from right MCA territory infarction with sulcal effacement and compression of the right lateral ventricle  Midline shift of approximately 3 mm to the left, stable  Otherwise, the ventricular system is moderately dilated  VISUALIZED ORBITS AND PARANASAL SINUSES:  No acute abnormality involving the orbits  Mild scattered sinus mucosal thickening is noted  No fluid levels are seen  CALVARIUM AND EXTRACRANIAL SOFT TISSUES:  Calvarium intact  Incidental note is made of ununited posterior arch of C1  Impression: 1  Evolving right MCA territory infarction with petechial hemorrhage in the right basal ganglia, stable  2   Cerebral atrophy with chronic small vessel ischemic change   Workstation performed: [id number]     Ct Chest Wo Contrast    Result Date: 7/25/2019  Narrative: CT CHEST WITHOUT IV CONTRAST INDICATION:   Pneumonia complicated / unresolved  Persistent leukocytosis  Sepsis  Clinical suspicion for pneumonia COMPARISON:  There are no prior CT studies for comparison  There is a chest x-ray performed yesterday TECHNIQUE: CT examination of the chest was performed without intravenous contrast   Axial, sagittal, and coronal 2D reformatted images were created from the source data and submitted for interpretation  Radiation dose length product (DLP) for this visit:  228 24 mGy-cm   This examination, like all CT scans performed in the [facility], was performed utilizing techniques to minimize radiation dose exposure, including the use of iterative  reconstruction and automated exposure control  Study is somewhat limited by unavoidable motion artifact  FINDINGS: LUNGS:  Although limited by motion artifact particularly at the lung bases, there is mild patchy airspace disease within the left lower lobe base  Mild patchy airspace disease also seen in the posterior aspect of the left upper lobe for example image 2/23 PLEURA:  There is a small pleural effusion located in the posteromedial pleura base HEART/GREAT VESSELS:  No pericardial effusion  Coronary artery calcification  MEDIASTINUM AND GIAN:  Note is made that a placed feeding tube makes a curl within the upper to mid mid esophagus, then  the tube continues down distally into the stomach  CHEST WALL AND LOWER NECK:   Unremarkable  VISUALIZED STRUCTURES IN THE UPPER ABDOMEN:  Unremarkable  OSSEOUS STRUCTURES:  No acute fracture or destructive osseous lesion  Impression: 1  Motion limited examination but demonstrating patchy airspace disease within the left lower lobe and posterior portion of the left upper lobe most suggestive of pneumonia  Follow-up until resolution  2  Small left effusion 3   Curling of the feeding tube within the upper and mid esophagus prior to the tube extending below left hemidiaphragm  The examination demonstrates a significant  finding and was documented as such in [facility] for liaison and referring practitioner notification  Workstation performed: [id number]     Mri Brain Wo Contrast    Result Date: 7/18/2019  Narrative: MRI BRAIN WITHOUT CONTRAST INDICATION: right MCA infarct  COMPARISON:   7/17/2019 CT/CTA TECHNIQUE:  Sagittal T1, axial T2, axial FLAIR, axial T1, axial [place] and axial diffusion imaging  IMAGE QUALITY:  Diagnostic  FINDINGS: BRAIN PARENCHYMA: A large region of acute to subacute infarction involves the principally the perisylvian portion of the right temporal lobe with less pronounced involvement of the adjacent inferior right frontal and anterior right parietal lobes  There is contiguous acute involvement of the head of the right caudate nucleus and corona radiata  Mild edema and mass effect with mild right lateral ventricular compression  Findings are consistent with CT  No additional acute ischemia identified  Mild microangiopathic  ischemic changes involve deep white matter elsewhere  Development of region of acute hemorrhage measuring approximately 1 cm x 1 5 cm acute microhemorrhage within the right lenticular nucleus and to lesser degree adjacent temporal lobe  There is no discrete mass, or midline shift  VENTRICLES:  The ventricles are normal in size and contour  SELLA AND PITUITARY GLAND:  Normal  ORBITS:  Normal  PARANASAL SINUSES:  Normal  VASCULATURE: Poorly visualized right middle cerebral artery CALVARIUM AND SKULL BASE:  Normal  EXTRACRANIAL SOFT TISSUES:  Normal      Impression: Large acute right MCA territory infarction primarily involving the temporal lobe with lesser degree of involvement of the inferolateral frontal lobe and anterior parietal lobe    Consistent with CT/CTA Development of acute microhemorrhage within the right lenticular nucleus and adjacent temporal lobe Findings personally discussed by phone with Dr Jasmyn Arroyo at 3:10 PM, 7/18/2019 Workstation performed: [id number]     Fl Barium Swallow Video W Speech    Result Date: 7/23/2019  Narrative: A video barium swallow study was performed by the Department of Speech Pathology  Please refer to the report for the official interpretation  The images are stored for archival purposes only  Study images were not formally reviewed by the Radiology Department  Xr Abdomen 1 Vw Portable    Result Date: 7/25/2019  Narrative: ABDOMEN INDICATION: Feeding tube placement  COMPARISON:  7/25/2019 VIEWS:  AP frontal IMAGES:  1 FINDINGS: Feeding tube courses below the diaphragms, tip terminating in the region of the gastric body  There is a nonobstructive bowel gas pattern  No discernible free air  Visualized lung bases are clear  Osseous structures stable  Impression: Feeding tube position as described  Workstation performed: [id number]     Xr Chest 1 View    Result Date: 7/25/2019  Narrative: CHEST INDICATION:   reposition dobhoff, reassess, at bedside  COMPARISON:  July 25, 2019 EXAM PERFORMED/VIEWS:  XR CHEST 1 VIEW  The frontal view was performed utilizing dual energy radiographic technique  FINDINGS:  Keofeed tube courses normally to the level of the stomach, the previous KK is resolved  The tip resides in the midline abdomen, probably gastric antral region  Cardiomediastinal silhouette appears unremarkable  The lungs are clear  No pneumothorax or pleural effusion  Osseous structures appear within normal limits for patient age  Impression: No acute cardiopulmonary disease  Interval resolution of kinked feeding tube course  Tip overlying midline abdomen  Workstation performed: [id number]     Xr Chest 1 View    Result Date: 7/25/2019  Narrative: CHEST INDICATION:   check for looping of dobhoff in esophagus   COMPARISON:  7/24/2019 EXAM PERFORMED/VIEWS:  XR CHEST 1 VIEW FINDINGS:  As clinically suspected, the feeding tube curls within the esophagus, within the upper to midportion  Then, further distally, the tip of the tube extends below the left hemidiaphragm  Cardiomediastinal silhouette appears unremarkable  There is mild patchy airspace disease at the left lung base  Osseous structures appear within normal limits for patient age  Impression: As clinically suspected, curling of the feeding tube within the esophagus before distally extending below the left hemidiaphragm  Patchy airspace disease at left lung base as better evaluated on chest CT  The examination demonstrates a significant  finding and was documented as such in [facility] for liaison and referring practitioner notification  Workstation performed: [id number]     Xr Chest 1 View    Result Date: 7/24/2019  Narrative: CHEST INDICATION:   PNA  COMPARISON:  7/22/2019  EXAM PERFORMED/VIEWS:  XR CHEST 1 VIEW FINDINGS: Heart shadow appears unremarkable  Atherosclerotic vascular calcifications are noted  The lungs are clear  No pneumothorax or pleural effusion  Osseous structures appear within normal limits for patient age  Impression: No acute cardiopulmonary disease  Workstation performed: [id number]     Ct Stroke Alert Brain    Result Date: 7/17/2019  Narrative: CT BRAIN - STROKE ALERT PROTOCOL INDICATION:   Acute stroke  COMPARISON:  None  TECHNIQUE:  CT examination of the brain was performed  In addition to axial images, coronal reformatted images were created and submitted for interpretation  Radiation dose length product (DLP) for this visit:  1017 2 mGy-cm   This examination, like all CT scans performed in the [facility], was performed utilizing techniques to minimize radiation dose exposure, including the use of iterative  reconstruction and automated exposure control  IMAGE QUALITY:  Diagnostic   FINDINGS:  PARENCHYMA:  Cortical and subcortical hypoattenuation with sulcal effacement in the right frontoparietal temporal region, also involving the posterior insula, right basal ganglia and external capsule  No evidence of intracranial hemorrhage  No midline shift  Periventricular and subcortical hypoattenuating foci consistent with microangiopathic disease  Increased density in right M1 and proximal M2 branches likely represent thrombus  VENTRICLES AND EXTRA-AXIAL SPACES:  No hydrocephalus or extra-axial collection  VISUALIZED ORBITS AND PARANASAL SINUSES:  Intact globes and orbits  Chronic paranasal sinus disease  CALVARIUM AND EXTRACRANIAL SOFT TISSUES:  No lytic or blastic lesion or soft tissue mass  Impression: 1  Acute to early subacute right MCA territory infarct involving greater than one third of the vascular territory  No evidence of hemorrhagic conversion or significant mass effect  2   Hyperdense right M1 and M2 branches suggesting acute thrombus  Findings were directly discussed with Argelia Ibarra on 7/17/2019 7:16 PM  Workstation performed: [id number]     Vas Carotid Complete Study    Result Date: 7/24/2019  Narrative:  [facility] REPORT CLINICAL: Indications: Patient presents with to evaluate for carotid artery stenosis s/p recent CVA  The patient was found unresponsive  This study was performed in ICU  Risk Factors: The patient has a history of colon cancer and former smoker  Clinical Right Pressure:  163/77 mm Hg, Left Pressure:  / mm Hg  FINDINGS:  Right        Impression  PSV  EDV (cm/s)  Ratio  Dist  ICA                 42          13   0 88  Mid  ICA                  70          20   1 46  Prox   ICA    70 - 99%    303         144   6 31  Dist CCA                  52          13         Mid CCA                   48           8   0 86  Prox CCA                  56           9         Ext Carotid              135          22   2 81  Prox Vert                 49          14         Subclavian                81           2          Left         Impression  PSV  EDV (cm/s)  Ratio  Dist  ICA                 45 13   0 49  Mid  ICA                  60          24   0 66  Prox  ICA    50 - 69%    173          64   1 90  Dist CCA                  84          31         Mid CCA                   91          24   1 02  Prox CCA                  89          25         Ext Carotid              115          31   1 26  Prox Vert                 45          14         Subclavian                76           9            CONCLUSION:  Impression RIGHT: There is 70-99% stenosis noted in the internal carotid artery  Plaque is a mixture of heterogenous/homogenous and irregular/smooth  Vertebral artery flow is antegrade  There is no significant subclavian artery disease  LEFT: There is 50-69% stenosis noted in the internal carotid artery  Plaque is heterogenous and irregular  Vertebral artery flow is antegrade  There is no significant subclavian artery disease  No prior exam for comparison  Internal carotid artery stenosis determination by consensus criteria from: Landen Tobar et al  Carotid Artery Stenosis: Gray-Scale and Doppler US Diagnosis - [employer] of Radiologists in [address] [place] [address] [facility], Radiology 2003; [phone]  SIGNATURE: Electronically Signed by: Isaías Langley MD on 2019-07-24 05:07:17 PM    Mri Pelvis Bony Wo And W Contrast    Result Date: 7/19/2019  Narrative: MRI BONY PELVIS WITH AND WITHOUT CONTRAST INDICATION:   Rigth hip pain, r/o non discplaced fracture  Stroke COMPARISON: X-ray from prior day TECHNIQUE:  MR sequences were obtained of the pelvis including: Precontrast: Localizer, Coronal STIR or coronal T2 fat sat, coronal T1, axial T1, axial T2 fat sat, sagittal T2 fat sat, Sagittal T1, axial LAVA T1 fat sat  Post contrast: axial LAVA T1 fat sat, coronal LAVA T1 fat sat  IV Contrast:  8 mL of Gadobutrol injection (SINGLE-DOSE) FINDINGS: RIGHT HIP: -JOINT EFFUSION: None  -BONE MARROW SIGNAL AND ALIGNMENT: Normal marrow signal demonstrated without hip fracture or AVN   -TROCHANTERIC BURSA: Contains fluid -MUSCLES AND TENDONS:  There is diffuse asymmetric hypertrophy of the right gluteus jarrod, with T2 bright signal   Relative hypoperfusion is seen near the origin  No enhancing mass or collection LEFT HIP: -JOINT EFFUSION: None  -BONE MARROW SIGNAL AND ALIGNMENT: Normal marrow signal demonstrated without hip fracture or AVN  -TROCHANTERIC BURSA: Normal  -MUSCLES AND TENDONS:  Intramuscular edema is seen within the proximal aspect of the vastus musculature proximally, and within the gluteus minimus BONY PELVIS: -SI JOINTS AND SYMPHYSIS PUBIS:   Intact  -VISUALIZED LUMBAR SPINE:  Unremarkable  -OVERALL MARROW SIGNAL:  Normal, without suspicious focal lesions  -MUSCULATURE:  As above -PELVIC SOFT TISSUES:   Additionally there is subcutaneous edema within the soft tissues surrounding the right hip SUBCUTANEOUS TISSUES: Normal     Impression: 1  Asymmetric hypertrophy and edema within the right gluteus jarrod, consider rhabdomyolysis 2  No fracture Workstation performed: [id number]     Xr Hips Bilateral 3-4 Vw W Pelvis If Performed    Result Date: 7/18/2019  Narrative: BILATERAL HIPS AND PELVIS INDICATION:   fall  COMPARISON:  None VIEWS:  XR HIPS BILATERAL 3-4 VW W PELVIS IF PERFORMED  FINDINGS: Possible nondisplaced right superior pubic ramus fracture  Correlate for point tenderness  Visualized lower lumbar spine is unremarkable  LEFT HIP: No significant hip degenerative changes  Joint space alignment is maintained  Soft tissues are unremarkable  RIGHT HIP: No significant hip degenerative changes  Joint space alignment is maintained  Soft tissues are unremarkable  Impression: Possible nondisplaced right superior pubic ramus fracture  Correlate for point tenderness  Workstation performed: [id number]     Cta Stroke Alert (head/neck)    Result Date: 7/17/2019  Narrative: CTA NECK AND BRAIN WITH AND WITHOUT CONTRAST INDICATION: Acute stroke  COMPARISON:   7/17/2019   TECHNIQUE:  Post contrast imaging was performed after administration of iodinated contrast through the neck and brain  Post contrast axial 0 625 mm images timed to opacify the arterial system  3D rendering was performed on an independent workstation  MIP reconstructions performed  Coronal reconstructions were performed of the noncontrast portion of the brain  Radiation dose length product (DLP) for this visit:  353 18 mGy-cm   This examination, like all CT scans performed in the [facility], was performed utilizing techniques to minimize radiation dose exposure, including the use of iterative  reconstruction and automated exposure control  IV Contrast:  85 mL of iohexol (OMNIPAQUE)  IMAGE QUALITY:   Motion artifact results in suboptimal evaluation FINDINGS: CERVICAL VASCULATURE AORTIC ARCH AND GREAT VESSELS:  Three-vessel configuration aortic arch  Great vessel origins not well evaluated due to motion artifact  No significant stenosis in the subclavian arteries  RIGHT VERTEBRAL ARTERY CERVICAL SEGMENT:  Normal origin  The vessel is normal in caliber throughout the neck  LEFT VERTEBRAL ARTERY CERVICAL SEGMENT:  Normal origin  The vessel is normal in caliber throughout the neck  RIGHT EXTRACRANIAL CAROTID SEGMENT:  Normal caliber common carotid artery  Calcified plaque at the bifurcation and ICA origin  Severe (70-90%) stenosis at the ICA origin  Decreased caliber of the vessel throughout the cervical segment with intimal thickening  LEFT EXTRACRANIAL CAROTID SEGMENT:  Normal caliber common carotid artery  Calcified plaque the bifurcation and ICA origin resulting in mild to moderate (50-60%) stenosis  NASCET criteria was used to determine the degree of internal carotid artery diameter stenosis  INTRACRANIAL VASCULATURE INTERNAL CAROTID ARTERIES:  Diminutive right carotid siphon likely secondary to severe proximal stenosis  Calcified plaque throughout the left carotid siphon without significant stenosis    Patent ophthalmic arteries  ANTERIOR CIRCULATION:  Absent or severely hypoplastic right A1 segment  Anterior to indicating artery visualized  No stenosis in the anterior cerebral arteries  MIDDLE CEREBRAL ARTERY CIRCULATION: Absence of opacification of the proximal right middle cerebral artery  Minimal enhancement of distal M3 branches suggesting poor collateral flow  No stenosis in the proximal right middle cerebral artery  DISTAL VERTEBRAL ARTERIES:  Normal distal vertebral arteries  Posterior inferior cerebellar artery origins are normal  Normal vertebral basilar junction  BASILAR ARTERY:  Basilar artery is normal in caliber  Normal superior cerebellar arteries  POSTERIOR CEREBRAL ARTERIES: Both posterior cerebral arteries arises from the basilar tip  Both arteries demonstrate normal enhancement  Tiny bilateral posterior communicating arteries  DURAL VENOUS SINUSES:  Limited evaluation without evidence of or thrombosis  NON VASCULAR ANATOMY BONY STRUCTURES:  No acute osseous abnormality  SOFT TISSUES OF THE NECK:  Normal  THORACIC INLET:  Unremarkable  Impression: 1  Severe (70-90%) stenosis at the origin of the right internal carotid artery  Remainder of the vessel is diminutive likely secondary to proximal stenosis  2   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  Very minimal opacification of distal M3 branches suggesting poor collateral flow  I personally discussed this study with Indira Elizalde on 7/17/2019 at 7:14 PM  Workstation performed: [id number]        EKG / Monitor: Personally reviewed      Afib    Cardiac testing:   Results for orders placed during the hospital encounter of 07/17/19   Echo complete with contrast if indicated    Narrative [address]  [address] [facility]   [phone]    Transthoracic Echocardiogram  2D, M-mode, Doppler, and Color Doppler    Study date:  18-Jul-2019    Patient: Nayeli Daniel  MR number: [id number]  Account number: [id number]  : 1935  Age: 81 years  Gender: Male  Status: Inpatient  Location: Bedside  Height: 66 in 66 in  Weight: 177 5 lb 177 5 lb  BP: 92/ 54 mmHg    Indications: Cerebral Thrombosis    Diagnoses: I67 9 - Cerebrovascular disease, unspecified    Sonographer:  ABI Bañuelos  Primary Physician:  Anastasia Kelly MD  Referring Physician:  WYATT Rhodes  Group:  [facility]  Interpreting Physician:  Cassandra Stokes MD    SUMMARY    PROCEDURE INFORMATION:  This was a technically difficult study  Echocardiographic views were limited due to restricted patient mobility, poor patient compliance, poor acoustic window availability, and lung interference  LEFT VENTRICLE:  Ejection fraction was estimated to be around 50 %  Although no diagnostic regional wall motion abnormality was identified, this possibility cannot be completely excluded on the basis of this study  Wall thickness was mildly increased  There was mild concentric hypertrophy  Doppler parameters were consistent with abnormal left ventricular relaxation (grade 1 diastolic dysfunction)  ATRIAL SEPTUM:  No Large ASD or large PFO identified by buuble study  Buuble study over all limited  MITRAL VALVE:  There was mild regurgitation  AORTIC VALVE:  Transaortic velocity was increased due to valvular stenosis  There was mild to moderate stenosis  peak velocity 2 33 m/se, Peak gradient 23, mean 11 mm of Hg and DENZEL around 1 35 to 1 4 cm2  TRICUSPID VALVE:  There was mild regurgitation  Estimated peak PA pressure was 35 mmHg  HISTORY: PRIOR HISTORY: Neuralgia, Colon Cancer    PROCEDURE: The procedure was performed at the bedside  This was a routine study  The transthoracic approach was used  The study included complete 2D imaging, M-mode, complete spectral Doppler, and color Doppler  The heart rate was 64 bpm,  at the start of the study   Intravenous contrast (agitated saline, 10 ml) was administered to evaluate shunting  Intravenous contrast ( 10 ml) was administered  Echocardiographic views were limited due to restricted patient mobility, poor  patient compliance, poor acoustic window availability, and lung interference  This was a technically difficult study  LEFT VENTRICLE: Size was normal  Ejection fraction was estimated to be around 50 %  Although no diagnostic regional wall motion abnormality was identified, this possibility cannot be completely excluded on the basis of this study  Wall  thickness was mildly increased  There was mild concentric hypertrophy  DOPPLER: Doppler parameters were consistent with abnormal left ventricular relaxation (grade 1 diastolic dysfunction)  RIGHT VENTRICLE: The size was normal  Systolic function was normal     LEFT ATRIUM: Size was normal     ATRIAL SEPTUM: No Large ASD or large PFO identified by buuble study  Buuble study over all limited  RIGHT ATRIUM: Size was normal     MITRAL VALVE: There was normal leaflet separation  DOPPLER: The transmitral velocity was within the normal range  There was no evidence for stenosis  There was mild regurgitation  AORTIC VALVE: The valve was probably trileaflet  Leaflets exhibited mildly increased thickness, mild calcification, and normal cuspal separation  DOPPLER: Transaortic velocity was increased due to valvular stenosis  There was mild to  moderate stenosis  peak velocity 2 33 m/se, Peak gradient 23, mean 11 mm of Hg and DENZEL around 1 35 to 1 4 cm2  There was no regurgitation  TRICUSPID VALVE: DOPPLER: There was mild regurgitation  Estimated peak PA pressure was 35 mmHg  PULMONIC VALVE: DOPPLER: There was no significant regurgitation  PERICARDIUM: There was no thickening or calcification  There was no pericardial effusion  AORTA: The root exhibited normal size  SYSTEMIC VEINS: IVC: The inferior vena cava was not well visualized      SYSTEM MEASUREMENT TABLES    2D mode  AoR Diam 2D: 3 6 cm  LA Diam (2D): 4 3 cm  LA/Ao (2D): 1 19  FS (2D Teich): 22 4 %  IVSd (2D): 1 24 cm  LVDEV: 124 cmï¾³  LVESV: 68 3 cmï¾³  LVIDd(2D): 5 1 cm  LVISd (2D): 3 96 cm  LVOT Area 2D: 3 14 cmï¾²  LVPWd (2D): 1 19 cm  SV (Teich): 55 7 cmï¾³    Apical four chamber  LVEF A4C: 45 %    Unspecified Scan Mode  DENZEL Cont Eq (Peak Patrick): 1 23 cmï¾²  DENZEL Cont Eq (VTI): 1 35 cmï¾²  LVOT (VTI): 18 8 cm  LVOT Diam : 2 cm  LVOT Vmax: 928 mm/s  LVOT Vmax; Mean: 902 mm/s  Peak Grad ; Mean: 3 mm[Hg]  SV (LVOT): 62 cmï¾³  VTI;Mean: 2 mm[Hg]  MV Peak A Patrick: 607 mm/s  MV Peak E Patrick  Mean: 408 mm/s  MVA (PHT): 3 67 cmï¾²  PHT: 60 ms  Max P mm[Hg]  V Max: 2780 mm/s  Vmax: 2780 mm/s  RA Area: 19 8 cmï¾²  RA Volume: 58 5 cmï¾³  TAPSE: 2 1 cm    [employer] Accredited Echocardiography Laboratory    Prepared and electronically signed by    Lyn Corea MD  Signed 2019 11:00:17             Brooks Lowery DO      "This note has been constructed using a voice recognition system  Therefore there may be syntax, spelling, and/or grammatical errors   Please call if you have any questions  "

## 2019-07-26 NOTE — H&P
H&P- Sarah Chávez [birth year], 81 y o  male MRN: [id number]    Unit/Bed#: [address] [place] [address] Encounter: [id number]    Primary Care Provider: Rachel Cantrell MD   Date and time admitted to hospital: 7/26/2019  3:04 PM        * Acute respiratory failure with hypoxia [facility])  Assessment & Plan  Multifactorial secondary to muscular weakness, difficulty in clearing secretion, atelectasis,  volume overload and then aspiration pneumonia  Despite aggressive conservative treatment patient Remains with significant secretions, and progressively worsening respiratory status  He decided to forego any further medical therapy and is admitted for inpatient hospice  So panel patch for copious secretion  IV morphine for respiratory distress p r n  Hospice care patient  Assessment & Plan  Profound CVA with severe sequela      Sepsis due to pneumonia [facility])  Assessment & Plan  Tylenol p r n  Fevers  Hospice care    Atrial fibrillation with RVR [facility])  Assessment & Plan  Initially treated however patient now on hospice care  Cerebrovascular accident (CVA) due to thrombosis of right middle cerebral artery [facility])  Assessment & Plan  Large MCA infarct with microhemorrhages  Patient was treated aggressively but was failing management  Previously was completely independent  Hospice care      VTE Prophylaxis: none  / none  Code Status: Level 4 - Comfort Care    Anticipated Length of Stay:  Patient will be admitted on an Hospice basis with an anticipated length of stay of  > 2 midnights  Justification for Hospital Stay: inpatient hospice care    Total Time for Visit, including Counseling / Coordination of Care: 45 minutes  Greater than 50% of this total time spent on direct patient counseling and coordination of care  Chief Complaint:   No chief complaint on file        History of Present Illness:    Sarah Chávez is a 81 y o  male with a PMH of of colon cancer, insomnia, who was previously completely independent but had limited medical care originally presented to the hospital on 7/17/2019 due to left-sided weakness and altered mental status  In the ED he was a stroke alert and he was diagnosed with an extensive right MCA CVA with micro hemorrhage and severe carotid stenosis  He was not tPA candidate and was admitted to the ICU for closer monitoring  Was treated in the normal stroke pathway fashion with ASA, permissive hypertension, statin  On admission he was also noted to be in AFib with RVR which was new onset  He was started on Cardizem drip and Cardiology was consulted  He was transitioned from Cardizem drip to IV digoxin  He was started on IV heparin for anticoagulation  After his stroke he had severe oropharyngeal dysphagia  VBSS showed aspiration and he was started on a modified diet however with that he was still having coughing and choking spells and was not taking in adequate calories  Discussion was had with the family regarding his need for nutrition  Patient and family agreed to a short-term PEG to allow him some nutrition while he weeks to see if dysphagia improved  Peg was supposed to be placed however patient became septic with pneumonia concerning for aspiration  His respiratory status worsened with hypoxic respiratory failure  He was placed on BiPAP and was not improving  Discussion was had with the patient and the family regarding his guarded prognosis and multiple medical issues  Patient decided against any further treatment and wanted to start hospice care  His family was in agreement with his decision  He is admitted for inpatient hospice  Review of Systems:    Review of Systems   Reason unable to perform ROS: limited ROS due to patients acuity  Constitutional: Negative for activity change  Respiratory: Positive for cough and shortness of breath  Cardiovascular: Negative for chest pain  Gastrointestinal: Negative for abdominal pain  Musculoskeletal: Negative for back pain         Past Medical and Surgical History:     Past Medical History:   Diagnosis Date    Malignant neoplasm of descending colon ([facility] [address] ) 05/12/2006    Neuralgia     Last Assessed:6/25/13       No past surgical history on file  Meds/Allergies:    Prior to Admission medications    Medication Sig Start Date End Date Taking? Authorizing Provider   mirtazapine (REMERON) 30 mg tablet Take 1 tablet (30 mg total) by mouth daily at bedtime 7/8/19   Rachel Cantrell MD   QUEtiapine (SEROquel) 100 mg tablet Take 1 tablet (100 mg total) by mouth daily at bedtime 7/8/19   Rachel Cantrell MD       Allergies: Allergies   Allergen Reactions    Hydrocodone        Social History:     Marital Status:    Substance Use History:   Social History     Substance and Sexual Activity   Alcohol Use Not Currently    Frequency: Never    Binge frequency: Never     Social History     Tobacco Use   Smoking Status Former Smoker   Smokeless Tobacco Never Used     Social History     Substance and Sexual Activity   Drug Use No       Family History:    non-contributory    Physical Exam:     Vitals:        Physical Exam   Constitutional: He is oriented to person, place, and time  He appears well-developed  He appears distressed  HENT:   Head: Normocephalic and atraumatic  Cardiovascular: Normal rate, regular rhythm and normal heart sounds  No murmur heard  Pulmonary/Chest: He is in respiratory distress  He has no wheezes  He has no rales  Labored breathing, shallow breathing,   Abdominal: Soft  Bowel sounds are normal  He exhibits no distension  There is no tenderness  There is no rebound  Genitourinary:   Genitourinary Comments: Cohen in place   Musculoskeletal: He exhibits edema (Trace)  He exhibits no tenderness or deformity  Neurological: He is oriented to person, place, and time  Listless   Skin: Skin is warm  He is diaphoretic  Psychiatric: He has a normal mood and affect   His behavior is normal    Nursing note and vitals reviewed  Additional Data:     Lab Results: NA    Results from last 7 days   Lab Units 07/26/19  0543  07/24/19  1042   WBC Thousand/uL 16 30*   < > 14 63*   HEMOGLOBIN g/dL 10 3*   < > 11 3*   HEMATOCRIT % 31 1*   < > 35 4*   PLATELETS Thousands/uL 305   < > 305   NEUTROS PCT %  --   --  75    < > = values in this interval not displayed  Results from last 7 days   Lab Units 07/26/19  0543   SODIUM mmol/L 136   POTASSIUM mmol/L 4 2   CHLORIDE mmol/L 104   CO2 mmol/L 21   BUN mg/dL 21   CREATININE mg/dL 1 12   CALCIUM mg/dL 8 3   TOTAL BILIRUBIN mg/dL 0 70   ALK PHOS U/L 88   ALT U/L 38   AST U/L 49*             Results from last 7 days   Lab Units 07/21/19  1806   POC GLUCOSE mg/dl 122     Results from last 7 days   Lab Units 07/24/19  1917   LACTIC ACID mmol/L 1 2       Imaging: NA    No orders to display       No orders to display       EKG, Pathology, and Other Studies Reviewed on Admission:   · EKG: NA    [facility] Review[facility] Yes     ** Please Note: This note has been constructed using a voice recognition system   **

## 2019-07-26 NOTE — PROGRESS NOTES
2000 Enteral/Parental tube feeding with Jevity 1 2 continuous at 10 ml/hr with 120 ml of water flush every 4 hours  NGT checked for placement, intact with 5 ml of residual tube feeding contain  Feeding increased to 20 ml/hr  Abdomen soft non distended, No s/s of nausea or vomiting noted  Now feeding is running at 20 ml/hr  Next evaluation will be in 6 hours or at 0200

## 2019-07-26 NOTE — PROGRESS NOTES
0145 patient seen awake at alert with a moist productive cough  NGT appear displaced  Tube feeding stopped and MD called  Dr Yuli East seen patient and ordered start CXR  CXR showed NGT at the level of patient's throat  MD ordered to D/C NTG  Procedure for NGT discontinuance explained  Patient agreeable and verbalized understanding  NGT D/C per hospital protocol  Patient tolerated well

## 2019-07-26 NOTE — [place] UNIVERSAL TRANSFER FORM
[place] UN[place] FORM  (ALL ITEMS MUST BE COMPLETED)    1  TRANSFER FROM: [address]      TRANSFER TO: [address]     2  DATE OF TRANSFER: 7/26/2019                        TIME OF TRANSFER: 1600    3  PATIENT NAME: HOLLY Alanis      YOB: 1935                             GENDER: male    4  LANGUAGE:   English    5  PHYSICIAN NAME:  Nikole Hallman MD                   PHONE: [phone]    6  CODE STATUS: Level 4 - [facility] DNR Attached: Yes    7  :                                      :  Extended Emergency Contact Information  Primary Emergency Contact: Linda Cardozo   [facility] [address] Phone: [phone]  Relation: Daughter  Secondary Emergency Contact: Monica Haji  [facility] Phone: [phone]  Relation: Son   needed? No           Health Care Representative/Proxy:  Yes           Legal Guardian:  Yes             NAME OF:           HEALTH CARE REPRESENTATIVE/PROXY:                                         OR           LEGAL GUARDIAN, IF NOT :                                               PHONE:  (Day)           (Night)                        (Cell)    8  REASON FOR TRANSFER: (Must include brief medical history and recent changes in physical function or cognition ) Hospice, end of life care            V/S: There were no vitals taken for this visit  PAIN: None    9  PRIMARY DIAGNOSIS: <principal problem not specified>      Secondary Diagnosis:         Pacemaker: No      Internal Defib: No          Mental Health Diagnosis (if Applicable):    10  RESTRAINTS: No     11  RESPIRATORY NEEDS: BPAP    12  ISOLATION/PRECAUTION: None    13  ALLERGY: Hydrocodone    14  SENSORY:       Vision Good, Hearing Good  and Speech Difficult    15  SKIN CONDITION: Yes:  Sitesacrum    16  DIET: Special (describe)NPO    17  IV ACCESS: OtherMidline    18   PERSONAL ITEMS SENT WITH PATIENT: None    19  ATTACHED DOCUMENTS: MUST ATTACH CURRENT MEDICATION INFORMATION Face Sheet, MAR, Medication Reconciliation, Respiratory Care, Advanced Directive, Code Status, Discharge Summary, PT Note, OT Note and ST Note    20  AT RISK ALERTS:Falls, Pressure Ulcer and Aspiration        HARM TO: N/A    21  WEIGHT BEARING STATUS:         Left Leg: None        Right Leg: None    22  MENTAL STATUS:Alert    23  FUNCTION:        Walk: Not Able        Transfer: Not Able        Toilet: Not Able        Feed: Not Able    24  IMMUNIZATIONS/SCREENING:     Immunization History   Administered Date(s) Administered    Pneumococcal Polysaccharide PPV23 06/25/2013       25  BOWEL: Date Last BM7/26    32  BLADDER: Cohen Catheter    27   SENDING FACILITY CONTACT: Joel Ribeiro                  Title: RN        Unit: [address] [place] [address]        Phone: [phone] [address] (if known):        Title:        Unit:         Phone:         FORM PREFILLED BY (if applicable)       Title:       Unit:        Phone:         FORM COMPLETED BY Peter Brown RN      Title: RN      Phone: [phone]

## 2019-07-26 NOTE — ASSESSMENT & PLAN NOTE
Multifactorial secondary to muscular weakness, difficulty in clearing secretion, atelectasis,  volume overload and then aspiration pneumonia  Despite aggressive conservative treatment patient Remains with significant secretions, and progressively worsening respiratory status  He decided to forego any further medical therapy and is admitted for inpatient hospice  So panel patch for copious secretion  IV morphine for respiratory distress p r n

## 2019-07-26 NOTE — DISCHARGE SUMMARY
Discharge- Radha Magana 1935, 81 y o  male MRN: [id number]    Unit/Bed#: [address] [address] Encounter: [id number]    Primary Care Provider: Grayson Runner, MD   Date and time admitted to hospital: 7/17/2019  6:48 PM        Sepsis due to pneumonia [facility])  Assessment & Plan  Patient met sepsis criteria on 4/24 with leukocytosis and tachycardia  No immediate clear source  CXR was nml, lactate nml  Today WBC continues to rise  · Started cefepime/flagyl for presumptive PNA given patients tenuous respiratory status and high risk of aspiration PNA  · CT chest pending  · ID consulted, recs appreciated  · Monitor WBC, fevers    Acute respiratory failure with hypoxia [facility])  Assessment & Plan  Multifactorial secondary to muscular weakness, difficulty in clearing secretion, atelectasis,  volume overload and now possible PNA  Initial Chest x-ray with small left-sided effusion without any acute findings  Remains with significant secretion requiring frequent suctioning  Repeat CXR NAD, however clinically patient appears to have tenuous respiratory status, with increased secretions, weak cough, and sepsis  Likely PNA  · Percussion vest and chest PT  · Continue BiPAP as needed  · Continue aspiration precaution  · Monitor intake and output  · IV Lasix as needed to maintain euvolemia      Symptomatic stenosis of right carotid artery  Assessment & Plan  Per vascular surgery follow up as OP for elective CEA evaluation once more stable    Atrial fibrillation with RVR ([facility] [address] )  Assessment & Plan  Noted to be in new onset rapid AFib with associated tachypnea on 07/19  unclear if contributary or subsequent to CVA  Heart rate improved with Cardizem drip (7/19-7/22)  Status post digoxin loading on 07/19  · Cardiology consult appreciated  · Started on IV heparin, change to PO AC once can take pills safely  · Continue metoprolol, increase to 5mg q6hr for better HR control  · Continue IV digoxin until can take pills safely   We dont have liquid digoxin on formulary  If cannot take digoxin via NG/PEG then will need another medication options    Oropharyngeal dysphagia  Assessment & Plan  2/2 acute CVA  VBSS showed aspiration- attempted to start puree with honey thick liquids however patient did not do well, with coughing, choking and not able to take in enough PO for calorie need  · GI consulted for PEG tube, patient agreeable to it for short term, does not want it lifelong  · PEG cannot be placed now given sepsis  · Dobhoff placed by GI service until PEG can be placed  · Start TFs per nutrition recs- 1 2 Jevity, goal 60/hr with 120cc water flushes q4hr    Urinary retention  Assessment & Plan  Bladder scan noted to have more than 800 cc of urinary retention  Setting of CVA, ELOISE, rhabdomyolysis, pelvic fracture  Cohen catheter was placed  Voiding trial when patient more mobile    Depression with anxiety  Assessment & Plan  Holding home medication mirtazapine and seroquel    Traumatic rhabdomyolysis [facility])  Assessment & Plan  Patient was found lying in the floor for prolonged preop  CPK 5381 on admission, peaked at 6896 and now downtrending  MRI revealed asymmetric hypertrophy and edema within the right gluteus jarrod, likely cause of rhabdomyolysis  · Continue to monitor  · Stopped IVFs, now on water flushes with TFs  · Once CK nml can increase lipitor dose    Insomnia  Assessment & Plan  Hold home medication mirtazapine and seroquel    * Cerebrovascular accident (CVA) due to thrombosis of right middle cerebral artery [facility])  Assessment & Plan  Patient found down on the bathroom floor with left-sided weakness and altered mentation 7/17, was a stroke alert  NIH scale 13 on admission  Patient displayed right sided gaze preference, left sided weakness, left sided facial droop  Speech incoherent     CT head, CTA head neck revealed "Severe (70-90%) stenosis at the origin of the right internal carotid artery   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"  MRI of the brain - Large acute right MCA territory infarction   Development of acute microhemorrhage within the right lenticular nucleus and adjacent temporal lobe  Endovascular Neurology was contacted by ED, who declined intervention given age and unknown onset   Dr Amarjit Peña with Neurology was contacted, patient not a tPA candidate  Recommended 300mg rectal ASA and stroke pathway with close neurologic monitoring overnight given large infarct with possibility of swelling as timing of infarct is unknown    2D echo-EF 25%, grade 1 diastolic dysfunction  No shunt noted  CVA most likely secondary to symptomatic high-grade right carotid stenosis, but cannot rule out contribution from AFib  , hemoglobin A1c 6  Repeat CT head - Evolving right MCA territory infarction with petechial hemorrhage in the right basal ganglia, stable  · Continue full dose aspirin daily  · Lipitor 40 mg daily for now but increase to 80 mg once rhabdomyolysis is improved, follow-up CPK  · Discussed with Cardiology and Neurology, patient was started on IV heparin  · PT/OT/ST  · Vascular surgery consultation noted, recommended follow-up as outpatient for carotid endarterectomy  Follow-up baseline carotid ultrasound  · Treatment of dysphagia as noted  · STR placement once medically stable    Right hip painresolved as of 7/25/2019  Assessment & Plan  Patient reported pain on the right groin    Related to patient's fall  X-ray revealed possibility of nondisplaced right superior pubic ramus fracture  Orthopedic input appreciated, recommended MRI of the right pelvis/hip  MRI-showed no evidence of fracture but revealed Asymmetric hypertrophy and edema within the right gluteus jarrod  PT OT as tolerated  Pain seems to have improved          Discharging Physician / Practitioner: Antione López MD  PCP: Arpit Guzmán MD  Admission Date: 7/17/2019  Discharge Date: 07/26/19    Reason for Admission: STROKE Alert (Pt found down on bathroom floor, unkown down, unkown normal   Vomitted prior to arrival )        Resolved Problems  Date Reviewed: 7/18/2019          Resolved    Elevated lactic acid level 7/20/2019     Resolved by  Lizbeth Torres MD    Leukocytosis 7/20/2019     Resolved by  Lizbeth Torres MD    Right hip pain 7/25/2019     Resolved by  Homer Cano MD    Acute kidney injury ([address] ) 7/20/2019     Resolved by  Lizbeth Torres MD          Consultations During Hospital Stay:  IP CONSULT TO NEUROLOGY  IP CONSULT TO CASE MANAGEMENT  IP CONSULT TO NUTRITION SERVICES  IP CONSULT TO CASE MANAGEMENT  IP CONSULT TO VASCULAR SURGERY  IP CONSULT TO CARDIOLOGY  IP CONSULT TO ORTHOPEDIC SURGERY  IP CONSULT TO GASTROENTEROLOGY  IP CONSULT TO INFECTIOUS DISEASES  IP CONSULT TO NUTRITION SERVICES  IP CONSULT TO PULMONOLOGY    Procedures Performed:     · 2D echo 7/18/19:  EF 50%  G1 DD  No shunt on bubble study  Moderate aortic stenosis  Significant Findings / Test Results:     ·   Results from last 7 days   Lab Units 07/26/19  0543 07/25/19  0402 07/24/19  1042   WBC Thousand/uL 16 30* 16 58* 14 63*   HEMOGLOBIN g/dL 10 3* 11 1* 11 3*   PLATELETS Thousands/uL 305 310 305     Results from last 7 days   Lab Units 07/26/19  0543 07/25/19  0402 07/24/19  0150  07/20/19  0430   SODIUM mmol/L 136 136 139   < > 144   POTASSIUM mmol/L 4 2 3 8 4 1   < > 3 4*   CHLORIDE mmol/L 104 105 107   < > 110*   CO2 mmol/L 21 23 23   < > 23   BUN mg/dL 21 16 16   < > 17   CREATININE mg/dL 1 12 0 98 0 96   < > 1 07   CALCIUM mg/dL 8 3 8 5 8 6   < > 7 9*   TOTAL BILIRUBIN mg/dL 0 70  --   --   --  0 90   ALK PHOS U/L 88  --   --   --  76   ALT U/L 38  --   --   --  35   AST U/L 49*  --   --   --  74*    < > = values in this interval not displayed               Lab Results   Component Value Date/Time    HGBA1C 6 0 07/18/2019 05:06 AM     Results from last 7 days   Lab Units 07/21/19  1806 07/21/19  1804   POC GLUCOSE mg/dl 122 37*     Results from last 7 days Lab Units 07/25/19  1222 07/24/19  1917 07/21/19  1757   LACTIC ACID mmol/L  --  1 2  --    PROCALCITONIN ng/ml 0 27*  --  0 21     Blood Culture:   Lab Results   Component Value Date    BLOODCX No Growth After 5 Days  07/17/2019    BLOODCX No Growth After 5 Days  07/17/2019     Urine Culture: No results found for: URINECX  Sputum Culture: No components found for: SPUTUMCX  Wound Culture: No results found for: WOUNDCULT     XR abdomen 1 view kub   Final Result by Francisca Puente MD (07/26 7248)      Feeding tube present with its tip overlying the mid cervical region  This should be advanced through the esophagus into the stomach  Workstation performed: [id number]         XR chest 1 view   Final Result by Mirella Woodward MD (07/25 0730)      No acute cardiopulmonary disease  Interval resolution of kinked feeding tube course  Tip overlying midline abdomen  Workstation performed: [id number]         XR abdomen 1 vw portable   Final Result by Adriane Alves MD (07/25 1633)      Feeding tube position as described  Workstation performed: [id number]         XR abdomen 1 view kub   Final Result by Meche White MD (07/25 1156)      1  Tip of the feeding tube is in the upper portion of the stomach   2  Curvilinear calcification of the distal aorta  Consider abdominal aortic ultrasound or abdominal CT to exclude the possibility of infrarenal abdominal aortic aneurysm               Workstation performed: [id number]         XR chest 1 view   Final Result by Meche White MD (07/25 4423)      As clinically suspected, curling of the feeding tube within the esophagus before distally extending below the left hemidiaphragm  Patchy airspace disease at left lung base as better evaluated on chest CT  The examination demonstrates a significant  finding and was documented as such in [facility] for liaison and referring practitioner notification               Workstation performed: [id number]         CT chest wo contrast   Final Result by Madelyn Antonio MD (07/25 1151)      1  Motion limited examination but demonstrating patchy airspace disease within the left lower lobe and posterior portion of the left upper lobe most suggestive of pneumonia  Follow-up until resolution  2  Small left effusion   3  Curling of the feeding tube within the upper and mid esophagus prior to the tube extending below left hemidiaphragm  The examination demonstrates a significant  finding and was documented as such in [facility] for liaison and referring practitioner notification  Workstation performed: [id number]         XR abdomen 1 view kub   Final Result by Nan Mahoney MD (07/25 0580)      Feeding tube just beyond the EG junction in the proximal stomach, this can be advanced further into the stomach  Workstation performed: [id number]         XR chest 1 view   Final Result by Angelito Macedo MD (07/24 1002)      No acute cardiopulmonary disease  Workstation performed: [id number]         FL barium swallow video w speech   Final Result by LISANDRO MARTINEZ (07/23 1058)      XR chest portable   Final Result by Angelito Macedo MD (07/22 1158)      Diminished left basilar opacity likely representing atelectasis or small effusion  Workstation performed: [id number]         VAS carotid complete study   Final Result by Santi Ya MD (07/24 1707)      XR chest portable   Final Result by Nan Mahoney MD (07/21 8667)      Small left-sided pleural effusion  Workstation performed: [id number]         CT head wo contrast   Final Result by Alison Dunbar DO (07/20 1123)   1  Evolving right MCA territory infarction with petechial hemorrhage in the right basal ganglia, stable  2   Cerebral atrophy with chronic small vessel ischemic change              Workstation performed: [id number]         MRI pelvis bony wo and w contrast   Final Result by Kimberley Mcwilliams MD (07/19 1947)      1  Asymmetric hypertrophy and edema within the right gluteus jarrod, consider rhabdomyolysis   2  No fracture               Workstation performed: [id number]         XR chest portable   Final Result by Nikita Donald DO (07/19 0940)      No acute cardiopulmonary disease  See comments  Workstation performed: [id number]         XR hips bilateral 3-4 vw w pelvis if performed   Final Result by Juan Mireles DO (07/18 5477)      Possible nondisplaced right superior pubic ramus fracture  Correlate for point tenderness  Workstation performed: [id number]         MRI brain wo contrast   Final Result by Wilbert Brunner MD (07/18 6397)   Large acute right MCA territory infarction primarily involving the temporal lobe with lesser degree of involvement of the inferolateral frontal lobe and anterior parietal lobe  Consistent with CT/CTA      Development of acute microhemorrhage within the right lenticular nucleus and adjacent temporal lobe      Findings personally discussed by phone with Dr Nels Seip at 3:10 PM, 7/18/2019            Workstation performed: [id number]         XR chest 1 view portable   Final Result by Rudy Velazquez MD (07/18 2380)      No acute cardiopulmonary disease  Workstation performed: [id number]         CTA stroke alert (head/neck)   Final Result by Fadi Saldana MD (07/17 1922)         1  Severe (70-90%) stenosis at the origin of the right internal carotid artery  Remainder of the vessel is diminutive likely secondary to proximal stenosis  2   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  Very minimal opacification of distal M3 branches suggesting poor collateral flow        I personally discussed this study with Deniz Osei on 7/17/2019 at 7:14 PM                            Workstation performed: [id number]         CT stroke alert brain   Final Result by Fadi Saldana MD (07/17 1922)         1  Acute to early subacute right MCA territory infarct involving greater than one third of the vascular territory  No evidence of hemorrhagic conversion or significant mass effect  2   Hyperdense right M1 and M2 branches suggesting acute thrombus  Findings were directly discussed with Lexi Houston on 7/17/2019 7:16 PM       Workstation performed: [id number]                Incidental Findings:   ·      Test Results Pending at Discharge (will require follow up):   ·      Outpatient Tests Requested:  ·     Complications:  none    Reason for Admission:   Chief Complaint   Patient presents with    STROKE Alert     Pt found down on bathroom floor, unkown down, unkown normal   Vomitted prior to arrival        Hospital Course:     Pasquale Latif is a 81 y o  male patient with a PMH of colon cancer, insomnia, who was previously completely independent but had limited medical care originally presented to the hospital on 7/17/2019 due to left-sided weakness and altered mental status     In the ED he was a stroke alert and he was diagnosed with an extensive right MCA CVA with micro hemorrhage and severe carotid stenosis  He was not tPA candidate and was admitted to the ICU for closer monitoring  Was treated in the normal stroke pathway fashion with ASA, permissive hypertension, statin  On admission he was also noted to be in AFib with RVR which was new onset  He was started on Cardizem drip and Cardiology was consulted  He was transitioned from Cardizem drip to IV digoxin  He was started on IV heparin for anticoagulation  After his stroke he had severe oropharyngeal dysphagia  VBSS showed aspiration and he was started on a modified diet however with that he was still having coughing and choking spells and was not taking in adequate calories  Discussion was had with the family regarding his need for nutrition    Patient and family agreed to a short-term PEG to allow him some nutrition while he weeks to see if dysphagia improved  Peg was supposed to be placed however patient became septic with pneumonia concerning for aspiration  His respiratory status worsened with hypoxic respiratory failure  He was placed on BiPAP and was not improving  Discussion was had with the patient and the family regarding his guarded prognosis and multiple medical issues  Patient decided against any further treatment and wanted to start hospice care  His family was in agreement with his decision  He was discharged to inpatient hospice  Please see above list of diagnoses and related plan for additional information  Condition at Discharge: terminal       Discharge Day Visit / Exam:     Subjective:  Patient complaining of shortness of breath  Does not feel well  No longer wants testing  Does not want any more job offs or PEG tube  Family at bedside  Patient and family understand the severity of his condition  Patient wants to be made comfortable and does not want any more treatments  Family in agreement  Vitals: Blood Pressure: 127/81 (07/26/19 1300)  Pulse: 83 (07/26/19 1300)  Temperature: 97 5 °F (36 4 °C) (07/26/19 1300)  Temp Source: Tympanic (07/26/19 1300)  Respirations: 20 (07/26/19 1300)  Height: 5' 6" (167 6 cm) (07/17/19 2149)  Weight - Scale: 79 4 kg (175 lb) (07/26/19 0600)  SpO2: 97 % (07/26/19 1300)  Exam:   Physical Exam   Constitutional: He appears well-developed  He appears distressed  Acutely ill-appearing   HENT:   Head: Normocephalic and atraumatic  Cardiovascular:   Murmur heard  Tachycardic   Pulmonary/Chest: He is in respiratory distress  He has no wheezes  He has no rales  Shallow breaths, using accessory muscles, appears labored  Decreased breath sounds  Wet weak cough   Abdominal: Soft  Bowel sounds are normal  He exhibits no distension  There is no tenderness  There is no rebound     Genitourinary:   Genitourinary Comments: Cohen in place with clear yellow urine Musculoskeletal: He exhibits edema  Neurological:   Listless   Skin: Skin is warm and dry  Psychiatric: His behavior is normal    Nursing note and vitals reviewed  Discharge instructions/Information to patient and family:   See after visit summary for information provided to patient and family  Provisions for Follow-Up Care:  See after visit summary for information related to follow-up care and any pertinent home health orders  Disposition:     Other: Inpatient hospice    Planned Readmission: no     Discharge Statement:  I spent >30 minutes discharging the patient  This time was spent on the day of discharge  I had direct contact with the patient on the day of discharge  Greater than 50% of the total time was spent examining patient, answering all patient questions, arranging and discussing plan of care with patient as well as directly providing post-discharge instructions  Additional time then spent on discharge activities  Discharge Medications:  See after visit summary for reconciled discharge medications provided to patient and family        ** Please Note: This note has been constructed using a voice recognition system **

## 2019-07-26 NOTE — SOCIAL WORK
Dr Renetta Aldana spoke with pts family about Hospice care then spoke with Yessica Lopez from [facility] who was onsite about evaluating pt for IP Hospice

## 2019-07-26 NOTE — PROGRESS NOTES
Progress Note - Infectious Disease   Cyndee Hollins 81 y o  male MRN: [id number]  Unit/Bed#: [address] [place] [address] Encounter: [id number]      Impression/Recommendations:  1  Sepsis  Source of sepsis is most likely pneumonia  Patient is clinically improved  Leukocytosis persists but temperature is down  Despite sepsis, patient remains systemically hemodynamically well, without toxicity or hypotension  Admission blood cultures have no growth  Antibiotic plan as in below  Monitor temperature/WBC  Monitor hemodynamics      2  Left-sided pneumonia  Given recent CVA and very debilitated bed-bound state at present, aspiration is likely  Given hospitalization over last 7 days, patient does have risk for resistant nosocomial pathogens, especially nosocomial GNR  Patient is stable on cefepime/Flagyl earlier today  He is at high risk for recurrent aspiration  Continue cefepime/Flagyl for now  Monitor temperature/WBC  Monitor respiratory symptoms      3  Acute hypoxic respiratory failure, multifactorial, secondary to generalized weakness/debility, poor secretion control and aspiration pneumonia above  Patient is currently on O2 support  O2 support per primary service      4  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      5  ELOISE on admission, secondary to rhabdomyolysis  Creatinine has normalized  Antibiotics at full dose  Monitor creatinine      Discussed patient in detail regarding the above plan  Discussed with Dr Yessica Khan from [facility] service  Antibiotics:  Cefepime/Flagyl # 2     Subjective:  Patient is stable  O2 saturation good on BiPAP, but patient rapidly desats off it  Persistent week cough  Temperature is down  He is tolerating antibiotic well  No nausea, vomiting or diarrhea      Objective:  Vitals:  Temp:  [98 1 °F (36 7 °C)-99 1 °F (37 3 °C)] 98 1 °F (36 7 °C)  HR:  [] 81  Resp:  [18-38] 24  BP: (111-158)/(62-88) 158/83  SpO2:  [93 %-98 %] 97 %  Temp (24hrs), Av 6 °F (37 °C), Min:98 1 °F (36 7 °C), Max:99 1 °F (37 3 °C)  Current: Temperature: 98 1 °F (36 7 °C)    Physical Exam:     General: Awake, alert, cooperative, no distress  Neck:  Supple  No mass  No lymphadenopathy  Lungs: Decreased breath sounds, sparse basilar rales, no wheezing, respirations unlabored  Heart:  Regular rate and rhythm, S1 and S2 normal, no murmur  Abdomen: Soft, nondistended, non-tender, bowel sounds active all four quadrants,        no masses, no organomegaly  Extremities: Stable leg edema  No erythema/warmth  No ulcer  Nontender to palpation  Skin:  No rash  Neuro: Not assessable  Invasive Devices     Peripheral Intravenous Line            Peripheral IV 07/21/19 Right;Dorsal (posterior) Forearm 5 days    Peripheral IV 07/25/19 Left Antecubital less than 1 day          Drain            Urethral Catheter Non-latex 16 Fr  7 days                Labs studies:   I have personally reviewed pertinent labs  Results from last 7 days   Lab Units 07/26/19  0543 07/25/19  0402 07/24/19  0150  07/20/19  0430   POTASSIUM mmol/L 4 2 3 8 4 1   < > 3 4*   CHLORIDE mmol/L 104 105 107   < > 110*   CO2 mmol/L 21 23 23   < > 23   BUN mg/dL 21 16 16   < > 17   CREATININE mg/dL 1 12 0 98 0 96   < > 1 07   EGFR ml/min/1 73sq m 61 72 73   < > 64   CALCIUM mg/dL 8 3 8 5 8 6   < > 7 9*   AST U/L 49*  --   --   --  74*   ALT U/L 38  --   --   --  35   ALK PHOS U/L 88  --   --   --  76    < > = values in this interval not displayed  Results from last 7 days   Lab Units 07/26/19  0543 07/25/19  0402 07/24/19  1042   WBC Thousand/uL 16 30* 16 58* 14 63*   HEMOGLOBIN g/dL 10 3* 11 1* 11 3*   PLATELETS Thousands/uL 305 310 305           Imaging Studies:   I have personally reviewed pertinent imaging study reports and images in PACS  EKG, Pathology, and Other Studies:   I have personally reviewed pertinent reports

## 2019-07-26 NOTE — PLAN OF CARE
Problem: Potential for Falls  Goal: Patient will remain free of falls  Description  INTERVENTIONS:  - Assess patient frequently for physical needs  -  Identify cognitive and physical deficits and behaviors that affect risk of falls  -  [place] fall precautions as indicated by assessment   - Educate patient/family on patient safety including physical limitations  - Instruct patient to call for assistance with activity based on assessment  - Modify environment to reduce risk of injury  - Consider OT/PT consult to assist with strengthening/mobility  Outcome: Progressing     Problem: Prexisting or High Potential for Compromised Skin Integrity  Goal: Skin integrity is maintained or improved  Description  INTERVENTIONS:  - Identify patients at risk for skin breakdown  - Assess and monitor skin integrity  - Assess and monitor nutrition and hydration status  - Monitor labs (i e  albumin)  - Assess for incontinence   - Turn and reposition patient  - Assist with mobility/ambulation  - Relieve pressure over bony prominences  - Avoid friction and shearing  - Provide appropriate hygiene as needed including keeping skin clean and dry  - Evaluate need for skin moisturizer/barrier cream  - Collaborate with interdisciplinary team (i e  Nutrition, Rehabilitation, etc )   - Patient/family teaching  Outcome: Progressing     Problem: Neurological Deficit  Goal: Neurological status is stable or improving  Description  Interventions:  - Monitor and assess patient's level of consciousness, motor function, sensory function, and level of assistance needed for ADLs  - Monitor and report changes from baseline  Collaborate with interdisciplinary team to initiate plan and implement interventions as ordered  - Provide and maintain a safe environment  - Utilize seizure precautions  - Utilize fall precautions  - Utilize aspiration precautions  - Utilize bleeding precautions  Outcome: Progressing     Problem:  Activity Intolerance/Impaired Mobility  Goal: Mobility/activity is maintained at optimum level for patient  Description  Interventions:  - Assess and monitor patient  barriers to mobility and need for assistive/adaptive devices  - Assess patient's emotional response to limitations  - Collaborate with interdisciplinary team and initiate plans and interventions as ordered  - Encourage independent activity per ability   - Maintain proper body alignment  - Perform active/passive rom as tolerated/ordered  - Plan activities to conserve energy   - Turn patient  Outcome: Progressing     Problem: Communication Impairment  Goal: Ability to express needs and understand communication  Description  Assess patient's communication skills and ability to understand information  Patient will demonstrate use of effective communication techniques, alternative methods of communication and understanding even if not able to speak  - Encourage communication and provide alternate methods of communication as needed  - Collaborate with case management/ for discharge needs  - Include patient/family/caregiver in decisions related to communication  Outcome: Progressing     Problem: Potential for Aspiration  Goal: Non-ventilated patient's risk of aspiration is minimized  Description  Assess and monitor vital signs, respiratory status, and labs (WBC)  Monitor for signs of aspiration (tachypnea, cough, rales, wheezing, cyanosis, fever)  - Assess and monitor patient's ability to swallow  - Place patient up in chair to eat if possible  - HOB up at 90 degrees to eat if unable to get patient up into chair   - Supervise patient during oral intake  - Instruct patient to take small bites  - Instruct patient to take small single sips when taking liquids    - Follow patient-specific strategies generated by speech pathologist   Outcome: Progressing     Problem: Nutrition  Goal: Nutrition/Hydration status is improving  Description  Monitor and assess patient's nutrition/hydration status for malnutrition (ex- brittle hair, bruises, dry skin, pale skin and conjunctiva, muscle wasting, smooth red tongue, and disorientation)  Collaborate with interdisciplinary team and initiate plan and interventions as ordered  Monitor patient's weight and dietary intake as ordered or per policy  Utilize nutrition screening tool and intervene per policy  Determine patient's food preferences and provide high-protein, high-caloric foods as appropriate  - Assist patient with eating   - Allow adequate time for meals   - Encourage patient to take dietary supplement as ordered  - Collaborate with clinical nutritionist   - Include patient/family/caregiver in decisions related to nutrition    Outcome: Progressing     Problem: PAIN - ADULT  Goal: Verbalizes/displays adequate comfort level or baseline comfort level  Description  Interventions:  - Encourage patient to monitor pain and request assistance  - Assess pain using appropriate pain scale  - Administer analgesics based on type and severity of pain and evaluate response  - Implement non-pharmacological measures as appropriate and evaluate response  - Consider cultural and social influences on pain and pain management  - Notify physician/advanced practitioner if interventions unsuccessful or patient reports new pain  Outcome: Progressing     Problem: SAFETY ADULT  Goal: Maintain or return to baseline ADL function  Description  INTERVENTIONS:  -  Assess patient's ability to carry out ADLs; assess patient's baseline for ADL function and identify physical deficits which impact ability to perform ADLs (bathing, care of mouth/teeth, toileting, grooming, dressing, etc )  - Assess/evaluate cause of self-care deficits   - Assess range of motion  - Assess patient's mobility; develop plan if impaired  - Assess patient's need for assistive devices and provide as appropriate  - Encourage maximum independence but intervene and supervise when necessary  ¯ Involve family in performance of ADLs  ¯ Assess for home care needs following discharge   ¯ Request OT consult to assist with ADL evaluation and planning for discharge  ¯ Provide patient education as appropriate  Outcome: Progressing  Goal: Maintain or return mobility status to optimal level  Description  INTERVENTIONS:  - Assess patient's baseline mobility status (ambulation, transfers, stairs, etc )    - Identify cognitive and physical deficits and behaviors that affect mobility  - Identify mobility aids required to assist with transfers and/or ambulation (gait belt, sit-to-stand, lift, walker, cane, etc )  - [place] fall precautions as indicated by assessment  - Record patient progress and toleration of activity level on Mobility SBAR; progress patient to next Phase/Stage  - Instruct patient to call for assistance with activity based on assessment  - Request Rehabilitation consult to assist with strengthening/weightbearing, etc   Outcome: Progressing     Problem: Knowledge Deficit  Goal: Patient/family/caregiver demonstrates understanding of disease process, treatment plan, medications, and discharge instructions  Description  Complete learning assessment and assess knowledge base  Interventions:  - Provide teaching at level of understanding  - Provide teaching via preferred learning methods  Outcome: Progressing     Problem: CARDIOVASCULAR - ADULT  Goal: Maintains optimal cardiac output and hemodynamic stability  Description  INTERVENTIONS:  - Monitor I/O, vital signs and rhythm  - Monitor for S/S and trends of decreased cardiac output i e  bleeding, hypotension  - Administer and titrate ordered vasoactive medications to optimize hemodynamic stability  - Assess quality of pulses, skin color and temperature  - Assess for signs of decreased coronary artery perfusion - ex   Angina  - Instruct patient to report change in severity of symptoms  Outcome: Progressing  Goal: Absence of cardiac dysrhythmias or at baseline rhythm  Description  INTERVENTIONS:  - Continuous cardiac monitoring, monitor vital signs, obtain 12 lead EKG if indicated  - Administer antiarrhythmic and heart rate control medications as ordered  - Monitor electrolytes and administer replacement therapy as ordered  Outcome: Progressing     Problem: METABOLIC, FLUID AND ELECTROLYTES - ADULT  Goal: Electrolytes maintained within normal limits  Description  INTERVENTIONS:  - Monitor labs and assess patient for signs and symptoms of electrolyte imbalances  - Administer electrolyte replacement as ordered  - Monitor response to electrolyte replacements, including repeat lab results as appropriate  - Instruct patient on fluid and nutrition as appropriate  Outcome: Progressing  Goal: Fluid balance maintained  Description  INTERVENTIONS:  - Monitor labs and assess for signs and symptoms of volume excess or deficit  - Monitor I/O and WT  - Instruct patient on fluid and nutrition as appropriate  Outcome: Progressing     Problem: SKIN/TISSUE INTEGRITY - ADULT  Goal: Skin integrity remains intact  Description  INTERVENTIONS  - Identify patients at risk for skin breakdown  - Assess and monitor skin integrity  - Assess and monitor nutrition and hydration status  - Monitor labs (i e  albumin)  - Assess for incontinence   - Turn and reposition patient  - Assist with mobility/ambulation  - Relieve pressure over bony prominences  - Avoid friction and shearing  - Provide appropriate hygiene as needed including keeping skin clean and dry  - Evaluate need for skin moisturizer/barrier cream  - Collaborate with interdisciplinary team (i e  Nutrition, Rehabilitation, etc )   - Patient/family teaching  Outcome: Progressing  Goal: Oral mucous membranes remain intact  Description  INTERVENTIONS  - Assess oral mucosa and hygiene practices  - Implement preventative oral hygiene regimen  - Implement oral medicated treatments as ordered  - Initiate Nutrition services referral as needed  Outcome: Progressing     Problem: MUSCULOSKELETAL - ADULT  Goal: Maintain or return mobility to safest level of function  Description  INTERVENTIONS:  - Assess patient's ability to carry out ADLs; assess patient's baseline for ADL function and identify physical deficits which impact ability to perform ADLs (bathing, care of mouth/teeth, toileting, grooming, dressing, etc )  - Assess/evaluate cause of self-care deficits   - Assess range of motion  - Assess patient's mobility; develop plan if impaired  - Assess patient's need for assistive devices and provide as appropriate  - Encourage maximum independence but intervene and supervise when necessary  - Involve family in performance of ADLs  - Assess for home care needs following discharge   - Request OT consult to assist with ADL evaluation and planning for discharge  - Provide patient education as appropriate  Outcome: Progressing  Goal: Maintain proper alignment of affected body part  Description  INTERVENTIONS:  - Support, maintain and protect limb and body alignment  - Provide pt/fam with appropriate education  Outcome: Progressing     Problem: GENITOURINARY - ADULT  Goal: Maintains or returns to baseline urinary function  Description  INTERVENTIONS:  - Assess urinary function  - Encourage oral fluids to ensure adequate hydration  - Administer IV fluids as ordered to ensure adequate hydration  - Administer ordered medications as needed  - Offer frequent toileting  - Follow urinary retention protocol if ordered  Outcome: Progressing  Goal: Absence of urinary retention  Description  INTERVENTIONS:  - Assess patient's ability to void and empty bladder  - Monitor I/O  - Bladder scan as needed  - Discuss with physician/AP medications to alleviate retention as needed  - Discuss catheterization for long term situations as appropriate   Outcome: Progressing  Goal: Urinary catheter remains patent  Description  INTERVENTIONS:  - Assess patency of urinary catheter  - If patient has a chronic meier, consider changing catheter if non-functioning  - Follow guidelines for intermittent irrigation of non-functioning urinary catheter  Outcome: Progressing     Problem: RESPIRATORY - ADULT  Goal: Achieves optimal ventilation and oxygenation  Description  INTERVENTIONS:  - Assess for changes in respiratory status  - Assess for changes in mentation and behavior  - Position to facilitate oxygenation and minimize respiratory effort  - Oxygen administration by appropriate delivery method based on oxygen saturation (per order) or ABGs  - Encourage broncho-pulmonary hygiene including cough, deep breathe, Incentive Spirometry  - Assess the need for suctioning and aspirate as needed  - Assess and instruct to report SOB or any respiratory difficulty  - Respiratory Therapy support as indicated  -Bipap PRN   Outcome: Progressing     Problem: Nutrition/Hydration-ADULT  Goal: Nutrient/Hydration intake appropriate for improving, restoring or maintaining nutritional needs  Description  Monitor and assess patient's nutrition/hydration status for malnutrition (ex- brittle hair, bruises, dry skin, pale skin and conjunctiva, muscle wasting, smooth red tongue, and disorientation)  Collaborate with interdisciplinary team and initiate plan and interventions as ordered  Monitor patient's weight and dietary intake as ordered or per policy  Utilize nutrition screening tool and intervene per policy  Determine patient's food preferences and provide high-protein, high-caloric foods as appropriate       INTERVENTIONS:  - Monitor oral intake, urinary output, labs, and treatment plans  - Assess nutrition and hydration status and recommend course of action  - Evaluate amount of meals eaten  - Assist patient with eating if necessary   - Allow adequate time for meals  - Recommend/ encourage appropriate diets, oral nutritional supplements, and vitamin/mineral supplements  - Order, calculate, and assess calorie counts as needed  - Recommend, monitor, and adjust tube feedings based on assessed needs  - Assess need for intravenous fluids  - Provide specific nutrition/hydration education as appropriate  - Include patient/family/caregiver in decisions related to nutrition   Outcome: Progressing

## 2019-07-27 NOTE — PLAN OF CARE
Problem: Potential for Falls  Goal: Patient will remain free of falls  Description  INTERVENTIONS:  - Assess patient frequently for physical needs  -  Identify cognitive and physical deficits and behaviors that affect risk of falls    -  [place] fall precautions as indicated by assessment   - Educate patient/family on patient safety including physical limitations  - Instruct patient to call for assistance with activity based on assessment  - Modify environment to reduce risk of injury  - Consider OT/PT consult to assist with strengthening/mobility  Outcome: Progressing     Problem: Prexisting or High Potential for Compromised Skin Integrity  Goal: Skin integrity is maintained or improved  Description  INTERVENTIONS:  - Identify patients at risk for skin breakdown  - Assess and monitor skin integrity  - Assess and monitor nutrition and hydration status  - Monitor labs (i e  albumin)  - Assess for incontinence   - Turn and reposition patient  - Assist with mobility/ambulation  - Relieve pressure over bony prominences  - Avoid friction and shearing  - Provide appropriate hygiene as needed including keeping skin clean and dry  - Evaluate need for skin moisturizer/barrier cream  - Collaborate with interdisciplinary team (i e  Nutrition, Rehabilitation, etc )   - Patient/family teaching  Outcome: Progressing     Problem: COPING  Goal: Pt/Family able to verbalize concerns and demonstrate effective coping strategies  Description  INTERVENTIONS:  - Assist patient/family to identify coping skills, available support systems and cultural and spiritual values  - Provide emotional support, including active listening and acknowledgement of concerns of patient and caregivers  - Reduce environmental stimuli, as able  - Provide patient education  - Assess for spiritual pain/suffering and initiate spiritual care, including notification of Pastoral Care or eul[place] based community as needed  - Assess effectiveness of coping strategies  Outcome: Progressing     Problem: DEATH & DYING  Goal: Pt/Family communicate acceptance of impending death and expresses psychological comfort and peace  Description  INTERVENTIONS:  - Assess patient/family anxiety and grief process related to end of life issues  - Provide emotional, spiritual and psychosocial support  - Provide information about the patients health status with consideration of family and cultural values  - Communicate willingness to discuss death and facilitate grief process  with patient/family as appropriate  - Emphasize sustaining relationships within family system and community, or e[place]/spiritual traditions  - Initiate Spiritual Care, Pastoral care or other ancillary consults as needed  - Refer to community support groups as appropriate  Outcome: Progressing

## 2019-07-27 NOTE — PROGRESS NOTES
Progress Note - Tito Servant 1935, 81 y o  male MRN: [id number]    Unit/Bed#: [address] [place] [address] Encounter: [id number]    Primary Care Provider: Santa Huang MD   Date and time admitted to hospital: 7/26/2019  3:04 PM        * Acute respiratory failure with hypoxia [facility])  Assessment & Plan  Multifactorial secondary to muscular weakness, difficulty in clearing secretion, atelectasis,  volume overload and then aspiration pneumonia  Despite aggressive conservative treatment patient Remains with significant secretions, and progressively worsening respiratory status  He decided to forego any further medical therapy and is admitted for inpatient hospice  Scopolamine patch for copious secretion  IV morphine for respiratory distress p r n  Hospice care patient  Assessment & Plan  Profound CVA with severe sequela      Sepsis due to pneumonia [facility])  Assessment & Plan  Tylenol p r n  Fevers  Hospice care    Atrial fibrillation with RVR [facility])  Assessment & Plan  Initially treated however patient now on hospice care  Cerebrovascular accident (CVA) due to thrombosis of right middle cerebral artery [facility])  Assessment & Plan  Large MCA infarct with microhemorrhages  Patient was treated aggressively but was failing management  Previously was completely independent  Hospice care        VTE Pharmacologic Prophylaxis:   Pharmacologic: none  Mechanical VTE Prophylaxis in Place: Yes    Patient Centered Rounds: I have performed bedside rounds with nursing staff today  Discussions with Specialists or Other Care Team Provider: Yes  Education and Discussions with Family / Patient:Yes  Time Spent for Care: 30 minutes  More than 50% of total time spent on counseling and coordination of care as described above  Current Length of Stay: 0 day(s)  Current Patient Status: Hospice     Discharge Plan: pending    Code Status: Level 4 - Comfort Care      Subjective:   Patient asleep   Opens his eye spontaneously but not talking  Per RN patient asked for his BIPAP overnight  Daughter at bedside, states he seems a lot more comfortable today  Objective:     Vitals:   Temp (24hrs), Av 4 °F (36 3 °C), Min:97 °F (36 1 °C), Max:97 5 °F (36 4 °C)    Temp:  [97 °F (36 1 °C)-97 5 °F (36 4 °C)] 97 5 °F (36 4 °C)  HR:  [83-92] 92  Resp:  [20-22] 20  BP: (116-134)/(54-81) 116/54  SpO2:  [94 %-98 %] 94 %  Body mass index is 25 11 kg/m²  Input and Output Summary (last 24 hours): Intake/Output Summary (Last 24 hours) at 2019 1105  Last data filed at 2019 0547  Gross per 24 hour   Intake 0 ml   Output 825 ml   Net -825 ml        Physical Exam:     Physical Exam   Constitutional: He appears lethargic  No distress  Ill appearing   Pulmonary/Chest: No respiratory distress  Wearing his BIPAP, decreased breath sounds   Musculoskeletal: He exhibits no edema  Neurological: He appears lethargic  Additional Data:     Labs:    Results from last 7 days   Lab Units 19  0543 19  0402 19  1042  19  0307  19  0624   WBC Thousand/uL 16 30* 16 58* 14 63*   < > 9 65   < > 9 80   HEMOGLOBIN g/dL 10 3* 11 1* 11 3*   < > 10 8*   < > 11 2*   HEMATOCRIT % 31 1* 33 6* 35 4*   < > 33 2*   < > 34 5*   PLATELETS Thousands/uL 305 310 305   < > 270   < > 246   NEUTROS PCT %  --   --  75  --  63  --  72    < > = values in this interval not displayed       Results from last 7 days   Lab Units 19  0543 19  0402 19  0150   SODIUM mmol/L 136 136 139   POTASSIUM mmol/L 4 2 3 8 4 1   CHLORIDE mmol/L 104 105 107   CO2 mmol/L 21 23 23   BUN mg/dL 21 16 16   CREATININE mg/dL 1 12 0 98 0 96   CALCIUM mg/dL 8 3 8 5 8 6   TOTAL BILIRUBIN mg/dL 0 70  --   --    ALK PHOS U/L 88  --   --    ALT U/L 38  --   --    AST U/L 49*  --   --              Lab Results   Component Value Date/Time    HGBA1C 6 0 2019 05:06 AM     Results from last 7 days   Lab Units 19  1806 19  1804   POC GLUCOSE mg/dl 122 37*     Results from last 7 days   Lab Units 07/25/19  1222 07/24/19  1917 07/21/19  1757   LACTIC ACID mmol/L  --  1 2  --    PROCALCITONIN ng/ml 0 27*  --  0 21       * I Have Reviewed All Lab Data Listed Above  * Additional Pertinent Lab Tests Reviewed: All Labs Within Last 24 Hours Reviewed    Imaging:     No orders to display     Imaging Reports Reviewed by myself    Cultures:   Blood Culture:   Lab Results   Component Value Date    BLOODCX No Growth After 5 Days  07/17/2019    BLOODCX No Growth After 5 Days  07/17/2019     Urine Culture: No results found for: URINECX  Sputum Culture: No components found for: SPUTUMCX  Wound Culture: No results found for: WOUNDCULT    Last 24 Hours Medication List:     Current Facility-Administered Medications:  acetaminophen 325 mg Rectal Q4H PRN Heber Patterson MD   bisacodyl 10 mg Rectal Daily PRN Heber Patterson MD   ipratropium 0 5 mg Nebulization Q6H Heber Patterson MD   levalbuterol 1 25 mg Nebulization Ashly Shaffer MD   And       sodium chloride 3 mL Nebulization Q6H Sweetie Hernandez MD   LORazepam 0 5 mg Intravenous Q4H PRN Heber Patterson MD   morphine injection 2 mg Intravenous Q4H Heber Patterson MD   morphine injection 2 mg Intravenous Q2H PRN Heber Patterson MD   polyvinyl alcohol 2 drop Both Eyes Q2H PRN Heber Patterson MD   scopolamine 1 patch Transdermal Amelia Posey MD        Today, Patient Was Seen By: Heber Patterson MD    ** Please Note: Dragon 360 Dictation voice to text software may have been used in the creation of this document   **

## 2019-07-27 NOTE — ASSESSMENT & PLAN NOTE
Multifactorial secondary to muscular weakness, difficulty in clearing secretion, atelectasis,  volume overload and then aspiration pneumonia  Despite aggressive conservative treatment patient Remains with significant secretions, and progressively worsening respiratory status  He decided to forego any further medical therapy and is admitted for inpatient hospice  Scopolamine patch for copious secretion  IV morphine for respiratory distress p r n

## 2019-07-27 NOTE — PLAN OF CARE
Problem: Potential for Falls  Goal: Patient will remain free of falls  Description  INTERVENTIONS:  - Assess patient frequently for physical needs  -  Identify cognitive and physical deficits and behaviors that affect risk of falls    -  [place] fall precautions as indicated by assessment   - Educate patient/family on patient safety including physical limitations  - Instruct patient to call for assistance with activity based on assessment  - Modify environment to reduce risk of injury  - Consider OT/PT consult to assist with strengthening/mobility  Outcome: Progressing     Problem: Prexisting or High Potential for Compromised Skin Integrity  Goal: Skin integrity is maintained or improved  Description  INTERVENTIONS:  - Identify patients at risk for skin breakdown  - Assess and monitor skin integrity  - Assess and monitor nutrition and hydration status  - Monitor labs (i e  albumin)  - Assess for incontinence   - Turn and reposition patient  - Assist with mobility/ambulation  - Relieve pressure over bony prominences  - Avoid friction and shearing  - Provide appropriate hygiene as needed including keeping skin clean and dry  - Evaluate need for skin moisturizer/barrier cream  - Collaborate with interdisciplinary team (i e  Nutrition, Rehabilitation, etc )   - Patient/family teaching  Outcome: Progressing     Problem: COPING  Goal: Pt/Family able to verbalize concerns and demonstrate effective coping strategies  Description  INTERVENTIONS:  - Assist patient/family to identify coping skills, available support systems and cultural and spiritual values  - Provide emotional support, including active listening and acknowledgement of concerns of patient and caregivers  - Reduce environmental stimuli, as able  - Provide patient education  - Assess for spiritual pain/suffering and initiate spiritual care, including notification of Pastoral Care or eulalia based community as needed  - Assess effectiveness of coping strategies  Outcome: Progressing     Problem: DEATH & DYING  Goal: Pt/Family communicate acceptance of impending death and expresses psychological comfort and peace  Description  INTERVENTIONS:  - Assess patient/family anxiety and grief process related to end of life issues  - Provide emotional, spiritual and psychosocial support  - Provide information about the patients health status with consideration of family and cultural values  - Communicate willingness to discuss death and facilitate grief process  with patient/family as appropriate  - Emphasize sustaining relationships within family system and community, or eulalia/spiritual traditions  - Initiate Spiritual Care, Pastoral care or other ancillary consults as needed  - Refer to community support groups as appropriate  Outcome: Progressing

## 2019-07-27 NOTE — PLAN OF CARE
Problem: Potential for Falls  Goal: Patient will remain free of falls  Description  INTERVENTIONS:  - Assess patient frequently for physical needs  -  Identify cognitive and physical deficits and behaviors that affect risk of falls    -  [place] fall precautions as indicated by assessment   - Educate patient/family on patient safety including physical limitations  - Instruct patient to call for assistance with activity based on assessment  - Modify environment to reduce risk of injury  - Consider OT/PT consult to assist with strengthening/mobility  Outcome: Progressing     Problem: Prexisting or High Potential for Compromised Skin Integrity  Goal: Skin integrity is maintained or improved  Description  INTERVENTIONS:  - Identify patients at risk for skin breakdown  - Assess and monitor skin integrity  - Assess and monitor nutrition and hydration status  - Monitor labs (i e  albumin)  - Assess for incontinence   - Turn and reposition patient  - Assist with mobility/ambulation  - Relieve pressure over bony prominences  - Avoid friction and shearing  - Provide appropriate hygiene as needed including keeping skin clean and dry  - Evaluate need for skin moisturizer/barrier cream  - Collaborate with interdisciplinary team (i e  Nutrition, Rehabilitation, etc )   - Patient/family teaching  Outcome: Progressing

## 2019-07-28 NOTE — RESPIRATORY THERAPY NOTE
Patient taken off BIPAP this am to check skins assessment patient skin intact red around the ages  Patient this morning did not want to go back on BIPAP  Patient place on HFNC at 10 L  Patient resting comfortable

## 2019-07-28 NOTE — ASSESSMENT & PLAN NOTE
Multifactorial secondary to muscular weakness, difficulty in clearing secretion, atelectasis,  volume overload and then aspiration pneumonia  Despite aggressive conservative treatment patient Remains with significant secretions, and progressively worsening respiratory status  He decided to forego any further medical therapy and is admitted for inpatient hospice  Scopolamine patch for copious secretion  Changed morphine to scheduled drip 0 5mg/hr, increased IV morphine 1mg, q1hr PRN  Patient  on 19 at 1345 due to respiratory arrest

## 2019-07-28 NOTE — RESPIRATORY THERAPY NOTE
Patient taken off BIPAP this am to check skins assessment patient skin intact red around the edge and bridge of nose  Patient this morning did not want to go back on BIPAP  Patient place on HFNC at 10 L  Patient resting comfortable

## 2019-07-28 NOTE — PLAN OF CARE
Problem: Potential for Falls  Goal: Patient will remain free of falls  Description  INTERVENTIONS:  - Assess patient frequently for physical needs  -  Identify cognitive and physical deficits and behaviors that affect risk of falls    -  [place] fall precautions as indicated by assessment   - Educate patient/family on patient safety including physical limitations  - Instruct patient to call for assistance with activity based on assessment  - Modify environment to reduce risk of injury  - Consider OT/PT consult to assist with strengthening/mobility  Outcome: Progressing     Problem: Prexisting or High Potential for Compromised Skin Integrity  Goal: Skin integrity is maintained or improved  Description  INTERVENTIONS:  - Identify patients at risk for skin breakdown  - Assess and monitor skin integrity  - Assess and monitor nutrition and hydration status  - Monitor labs (i e  albumin)  - Assess for incontinence   - Turn and reposition patient  - Assist with mobility/ambulation  - Relieve pressure over bony prominences  - Avoid friction and shearing  - Provide appropriate hygiene as needed including keeping skin clean and dry  - Evaluate need for skin moisturizer/barrier cream  - Collaborate with interdisciplinary team (i e  Nutrition, Rehabilitation, etc )   - Patient/family teaching  Outcome: Progressing     Problem: COPING  Goal: Pt/Family able to verbalize concerns and demonstrate effective coping strategies  Description  INTERVENTIONS:  - Assist patient/family to identify coping skills, available support systems and cultural and spiritual values  - Provide emotional support, including active listening and acknowledgement of concerns of patient and caregivers  - Reduce environmental stimuli, as able  - Provide patient education  - Assess for spiritual pain/suffering and initiate spiritual care, including notification of Pastoral Care or eulalia based community as needed  - Assess effectiveness of coping strategies  Outcome: Progressing     Problem: DEATH & DYING  Goal: Pt/Family communicate acceptance of impending death and expresses psychological comfort and peace  Description  INTERVENTIONS:  - Assess patient/family anxiety and grief process related to end of life issues  - Provide emotional, spiritual and psychosocial support  - Provide information about the patients health status with consideration of family and cultural values  - Communicate willingness to discuss death and facilitate grief process  with patient/family as appropriate  - Emphasize sustaining relationships within family system and community, or eulalia/spiritual traditions  - Initiate Spiritual Care, Pastoral care or other ancillary consults as needed  - Refer to community support groups as appropriate  Outcome: Progressing     Problem: RESPIRATORY - ADULT  Goal: Achieves optimal ventilation and oxygenation  Description  INTERVENTIONS:  - Assess for changes in respiratory status  - Assess for changes in mentation and behavior  - Position to facilitate oxygenation and minimize respiratory effort  - Oxygen administration by appropriate delivery method based on oxygen saturation (per order) or ABGs  - Initiate smoking cessation education as indicated  - Encourage broncho-pulmonary hygiene including cough, deep breathe, Incentive Spirometry  - Assess the need for suctioning and aspirate as needed  - Assess and instruct to report SOB or any respiratory difficulty  - Respiratory Therapy support as indicated  Outcome: Progressing

## 2019-07-28 NOTE — PROGRESS NOTES
Progress Note - Viviana Tello 1935, 81 y o  male MRN: [id number]    Unit/Bed#: [address] [place] [address] Encounter: [id number]    Primary Care Provider: Rianna Baldwin MD   Date and time admitted to hospital: 7/26/2019  3:04 PM        * Acute respiratory failure with hypoxia [facility])  Assessment & Plan  Multifactorial secondary to muscular weakness, difficulty in clearing secretion, atelectasis,  volume overload and then aspiration pneumonia  Despite aggressive conservative treatment patient Remains with significant secretions, and progressively worsening respiratory status  He decided to forego any further medical therapy and is admitted for inpatient hospice  Scopolamine patch for copious secretion  IV morphine for respiratory distress p r n  Hospice care patient  Assessment & Plan  Profound CVA with severe sequela      Sepsis due to pneumonia [facility])  Assessment & Plan  Tylenol p r n  Fevers  Hospice care    Atrial fibrillation with RVR [facility])  Assessment & Plan  Initially treated however patient now on hospice care  Cerebrovascular accident (CVA) due to thrombosis of right middle cerebral artery [facility])  Assessment & Plan  Large MCA infarct with microhemorrhages  Patient was treated aggressively but was failing management  Previously was completely independent  Hospice care        VTE Pharmacologic Prophylaxis:   Pharmacologic: none  Mechanical VTE Prophylaxis in Place: Yes    Patient Centered Rounds: I have performed bedside rounds with nursing staff today  Discussions with Specialists or Other Care Team Provider: Yes  Education and Discussions with Family / Patient:Yes  Time Spent for Care: 30 minutes  More than 50% of total time spent on counseling and coordination of care as described above      Current Length of Stay: 0 day(s)  Current Patient Status: Hospice     Discharge Plan: pending    Code Status: Level 4 - Comfort Care      Subjective:   Patient awake, denies SOB or pain but appears to to be having a hard time breathing  Family at bedside      Objective:     Vitals:   Temp (24hrs), Av 4 °F (36 9 °C), Min:98 4 °F (36 9 °C), Max:98 4 °F (36 9 °C)    Temp:  [98 4 °F (36 9 °C)] 98 4 °F (36 9 °C)  HR:  [98] 98  Resp:  [20] 20  BP: (111)/(72) 111/72  SpO2:  [86 %-94 %] 86 %  Body mass index is 25 11 kg/m²  Input and Output Summary (last 24 hours): Intake/Output Summary (Last 24 hours) at 2019 1018  Last data filed at 2019 0534  Gross per 24 hour   Intake 0 ml   Output 900 ml   Net -900 ml        Physical Exam:     Physical Exam   Constitutional: He appears well-developed  Ill appearing   Pulmonary/Chest:   Shallow breaths, tachypnea   Neurological: He is alert  Answering simple questions   Skin: He is not diaphoretic  Additional Data:     Labs:    Results from last 7 days   Lab Units 19  0543 19  0402 19  1042  19  0307   WBC Thousand/uL 16 30* 16 58* 14 63*   < > 9 65   HEMOGLOBIN g/dL 10 3* 11 1* 11 3*   < > 10 8*   HEMATOCRIT % 31 1* 33 6* 35 4*   < > 33 2*   PLATELETS Thousands/uL 305 310 305   < > 270   NEUTROS PCT %  --   --  75  --  63    < > = values in this interval not displayed       Results from last 7 days   Lab Units 19  0543 19  0402 19  0150   SODIUM mmol/L 136 136 139   POTASSIUM mmol/L 4 2 3 8 4 1   CHLORIDE mmol/L 104 105 107   CO2 mmol/L 21 23 23   BUN mg/dL 21 16 16   CREATININE mg/dL 1 12 0 98 0 96   CALCIUM mg/dL 8 3 8 5 8 6   TOTAL BILIRUBIN mg/dL 0 70  --   --    ALK PHOS U/L 88  --   --    ALT U/L 38  --   --    AST U/L 49*  --   --              Lab Results   Component Value Date/Time    HGBA1C 6 0 2019 05:06 AM     Results from last 7 days   Lab Units 19  1806 07/21/19  1804   POC GLUCOSE mg/dl 122 37*     Results from last 7 days   Lab Units 19  1222 19  1917 19  1757   LACTIC ACID mmol/L  --  1 2  --    PROCALCITONIN ng/ml 0 27*  --  0 21       * I Have Reviewed All Lab Data Listed Above  * Additional Pertinent Lab Tests Reviewed: All Labs Within Last 24 Hours Reviewed    Imaging:     No orders to display     Imaging Reports Reviewed by myself    Cultures:   Blood Culture:   Lab Results   Component Value Date    BLOODCX No Growth After 5 Days  07/17/2019    BLOODCX No Growth After 5 Days  07/17/2019     Urine Culture: No results found for: URINECX  Sputum Culture: No components found for: SPUTUMCX  Wound Culture: No results found for: WOUNDCULT    Last 24 Hours Medication List:     Current Facility-Administered Medications:  acetaminophen 325 mg Rectal Q4H PRN Devang Lomax MD   bisacodyl 10 mg Rectal Daily PRN Devang Lomax MD   ipratropium 0 5 mg Nebulization Q6H Devang Lomax MD   levalbuterol 1 25 mg Nebulization Noemi Waller MD   And       sodium chloride 3 mL Nebulization Q6H Sweetie Hernandez MD   LORazepam 0 5 mg Intravenous Q4H PRN Devang Lomax MD   morphine injection 2 mg Intravenous Q4H Devang Lomax MD   morphine injection 2 mg Intravenous Q2H PRN Devang Lomax MD   polyvinyl alcohol 2 drop Both Eyes Q2H PRN Devang Lomax MD   scopolamine 1 patch Transdermal Darline Dupont MD        Today, Patient Was Seen By: Devang Lomax MD    ** Please Note: Dragon 360 Dictation voice to text software may have been used in the creation of this document   **

## 2019-07-29 NOTE — PROGRESS NOTES
Progress Note - Adrián Patel 1935, 81 y o  male MRN: [id number]    Unit/Bed#: [address] [place] [address] Encounter: [id number]    Primary Care Provider: Dar Allen MD   Date and time admitted to hospital: 7/26/2019  3:04 PM        * Acute respiratory failure with hypoxia [facility])  Assessment & Plan  Multifactorial secondary to muscular weakness, difficulty in clearing secretion, atelectasis,  volume overload and then aspiration pneumonia  Despite aggressive conservative treatment patient Remains with significant secretions, and progressively worsening respiratory status  He decided to forego any further medical therapy and is admitted for inpatient hospice  Scopolamine patch for copious secretion  IV morphine for respiratory distress p r n  Hospice care patient  Assessment & Plan  Profound CVA with severe sequela      Sepsis due to pneumonia [facility])  Assessment & Plan  Tylenol p r n  Fevers  Hospice care    Atrial fibrillation with RVR [facility])  Assessment & Plan  Initially treated however patient now on hospice care  Cerebrovascular accident (CVA) due to thrombosis of right middle cerebral artery [facility])  Assessment & Plan  Large MCA infarct with microhemorrhages  Patient was treated aggressively but was failing management  Previously was completely independent  Hospice care        VTE Pharmacologic Prophylaxis:   Pharmacologic: none  Mechanical VTE Prophylaxis in Place: Yes    Patient Centered Rounds: I have performed bedside rounds with nursing staff today  Discussions with Specialists or Other Care Team Provider: Yes  Education and Discussions with Family / Patient:Yes  Time Spent for Care: 30 minutes  More than 50% of total time spent on counseling and coordination of care as described above  Current Length of Stay: 0 day(s)  Current Patient Status: Hospice     Discharge Plan: pending    Code Status: Level 4 - Comfort Care      Subjective:   Per RN no overnight issues         Objective:     Vitals: Temp (24hrs), Av 3 °F (36 3 °C), Min:95 7 °F (35 4 °C), Max:98 8 °F (37 1 °C)    Temp:  [95 7 °F (35 4 °C)-98 8 °F (37 1 °C)] 98 8 °F (37 1 °C)  HR:  [89-98] 89  Resp:  [20-24] 24  BP: (111-132)/(65-82) 111/65  SpO2:  [89 %-96 %] 89 %  Body mass index is 25 11 kg/m²  Input and Output Summary (last 24 hours): Intake/Output Summary (Last 24 hours) at 2019 1123  Last data filed at 2019 2147  Gross per 24 hour   Intake 0 ml   Output --   Net 0 ml        Physical Exam:     Physical Exam   Constitutional: He appears well-developed  Ill appearing   Pulmonary/Chest:   Shallow breaths, wearing face mask   Neurological:   Lethargic, opens eyes occasionally       Additional Data:     Labs:    Results from last 7 days   Lab Units 19  0543 19  0402 19  1042  19  0307   WBC Thousand/uL 16 30* 16 58* 14 63*   < > 9 65   HEMOGLOBIN g/dL 10 3* 11 1* 11 3*   < > 10 8*   HEMATOCRIT % 31 1* 33 6* 35 4*   < > 33 2*   PLATELETS Thousands/uL 305 310 305   < > 270   NEUTROS PCT %  --   --  75  --  63    < > = values in this interval not displayed  Results from last 7 days   Lab Units 19  0543 19  0402 19  0150   SODIUM mmol/L 136 136 139   POTASSIUM mmol/L 4 2 3 8 4 1   CHLORIDE mmol/L 104 105 107   CO2 mmol/L 21 23 23   BUN mg/dL 21 16 16   CREATININE mg/dL 1 12 0 98 0 96   CALCIUM mg/dL 8 3 8 5 8 6   TOTAL BILIRUBIN mg/dL 0 70  --   --    ALK PHOS U/L 88  --   --    ALT U/L 38  --   --    AST U/L 49*  --   --              Lab Results   Component Value Date/Time    HGBA1C 6 0 2019 05:06 AM         Results from last 7 days   Lab Units 19  1222 07/24/19  1917   LACTIC ACID mmol/L  --  1 2   PROCALCITONIN ng/ml 0 27*  --        * I Have Reviewed All Lab Data Listed Above  * Additional Pertinent Lab Tests Reviewed:  All Labs Within Last 24 Hours Reviewed    Imaging:     No orders to display     Imaging Reports Reviewed by myself    Cultures:   Blood Culture: Lab Results   Component Value Date    BLOODCX No Growth After 5 Days  07/17/2019    BLOODCX No Growth After 5 Days  07/17/2019     Urine Culture: No results found for: URINECX  Sputum Culture: No components found for: SPUTUMCX  Wound Culture: No results found for: WOUNDCULT    Last 24 Hours Medication List:     Current Facility-Administered Medications:  acetaminophen 325 mg Rectal Q4H PRN Ever Fisher MD   bisacodyl 10 mg Rectal Daily PRN Ever Fisher MD   hyoscyamine 0 125 mg Sublingual Q4H PRN Ever Fisher MD   ipratropium 0 5 mg Nebulization Q6H Ever Fisher MD   levalbuterol 1 25 mg Nebulization Kathy Hale MD   And       sodium chloride 3 mL Nebulization Q6H Sweetie Hernandez MD   LORazepam 0 5 mg Intravenous Q4H PRN Ever Fisher MD   morphine injection 2 mg Intravenous Q2H PRN Ever Fisher MD   morphine injection 2 mg Intravenous Q3H Ever Fisher MD   polyvinyl alcohol 2 drop Both Eyes Q2H PRN Ever Fisher MD   scopolamine 1 patch Transdermal Tripp Schneider MD        Today, Patient Was Seen By: Ever Fisher MD    ** Please Note: Dragon 360 Dictation voice to text software may have been used in the creation of this document   **

## 2019-07-29 NOTE — DEATH NOTE
INPATIENT DEATH NOTE  Tito Servant 81 y o  male MRN: [id number]  Unit/Bed#: [address] [place] [address] Encounter: [id number]    Date, Time and Cause of Death    Date of Death:  19  Time of Death:   1:45 PM  Preliminary Cause of Death:  Respiratory arrest          Patient's Information  Pronounced by: Purvi Blue  Did the patient's death occur in the ED?: No  Did the patient's death occur in the OR?: No  Was code status DNR at the time of death?: Yes    PHYSICAL EXAM:  Unresponsive to noxious stimuli, Spontaneous respirations absent, Breath sounds absent, Carotid pulse absent, Heart sounds absent, Pupillary light reflex absent and Corneal blink reflex absent    Medical Examiner notification criteria:  NONE APPLICABLE   Medical Examiner's office notified?:  No, does not meet ME notification criteria   Medical Examiner accepted case?:  No  Name of Medical Examiner: NASIM       Family at bedside, convalescences offered to them  Autopsy Options:  Decision for post-mortem examination not yet made by next of kin      Primary Service Attending Physician notified?:  yes - Attending:  Moreno Tam MD    Physician/Resident responsible for completing Discharge Summary:  Moreno Tam MD

## 2019-07-29 NOTE — PROGRESS NOTES
Patient is now in hospice care  He is off antibiotic  I will sign off  Thank you for the consultation  Please do not hesitate to reconsult us for any questions or issues

## 2019-07-29 NOTE — DISCHARGE SUMMARY
Discharge- Patrick Baron 1935, 81 y o  male MRN: [id number]    Unit/Bed#: [address] [place] [address] Encounter: [id number]    Primary Care Provider: Robert Harkins MD   Date and time admitted to hospital: 2019  3:04 PM        * Acute respiratory failure with hypoxia [facility])  Assessment & Plan  Multifactorial secondary to muscular weakness, difficulty in clearing secretion, atelectasis,  volume overload and then aspiration pneumonia  Despite aggressive conservative treatment patient Remains with significant secretions, and progressively worsening respiratory status  He decided to forego any further medical therapy and is admitted for inpatient hospice  Scopolamine patch for copious secretion  Changed morphine to scheduled drip 0 5mg/hr, increased IV morphine 1mg, q1hr PRN  Patient  on 19 at 5 due to respiratory arrest     Hospice care patient  Assessment & Plan  Profound CVA with severe sequela      Sepsis due to pneumonia [facility])  Assessment & Plan  Tylenol p r n  Fevers  Hospice care    Atrial fibrillation with RVR [facility])  Assessment & Plan  Initially treated however patient now on hospice care  Cerebrovascular accident (CVA) due to thrombosis of right middle cerebral artery [facility])  Assessment & Plan  Large MCA infarct with microhemorrhages  Patient was treated aggressively but was failing management  Previously was completely independent  Hospice care        Discharging Physician / Practitioner: Ana María Drake MD  PCP: Robert Harkins MD  Admission Date: 2019  Discharge Date: 19    Reason for Admission: No chief complaint on file          Resolved Problems  Date Reviewed: 2019    None          Consultations During Hospital Stay:  None    Procedures Performed:     ·     Significant Findings / Test Results:     ·   Results from last 7 days   Lab Units 19  0543 19  0402 19  1042   WBC Thousand/uL 16 30* 16 58* 14 63*   HEMOGLOBIN g/dL 10 3* 11 1* 11 3*   PLATELETS Thousands/uL 305 310 305     Results from last 7 days   Lab Units 07/26/19  0543 07/25/19  0402 07/24/19  0150   SODIUM mmol/L 136 136 139   POTASSIUM mmol/L 4 2 3 8 4 1   CHLORIDE mmol/L 104 105 107   CO2 mmol/L 21 23 23   BUN mg/dL 21 16 16   CREATININE mg/dL 1 12 0 98 0 96   CALCIUM mg/dL 8 3 8 5 8 6   TOTAL BILIRUBIN mg/dL 0 70  --   --    ALK PHOS U/L 88  --   --    ALT U/L 38  --   --    AST U/L 49*  --   --              Lab Results   Component Value Date/Time    HGBA1C 6 0 07/18/2019 05:06 AM         Results from last 7 days   Lab Units 07/25/19  1222 07/24/19  1917   LACTIC ACID mmol/L  --  1 2   PROCALCITONIN ng/ml 0 27*  --      Blood Culture:   Lab Results   Component Value Date    BLOODCX No Growth After 5 Days  07/17/2019    BLOODCX No Growth After 5 Days   07/17/2019     Urine Culture: No results found for: URINECX  Sputum Culture: No components found for: SPUTUMCX  Wound Culture: No results found for: WOUNDCULT     No orders to display          Incidental Findings:   ·      Test Results Pending at Discharge (will require follow up):   ·      Outpatient Tests Requested:  ·     Complications:  none    Reason for Admission: admitted for inpatient hospice care    Hospital Course:     Peter Massey is a 81 y o  male patient with a PMH of colon cancer, insomnia, who was previously completely independent but had limited medical care originally presented to the hospital on 7/17/2019 due to left-sided weakness and altered mental status   In the ED he was a stroke alert and he was diagnosed with an extensive right MCA CVA with micro hemorrhage and severe carotid stenosis   He was not tPA candidate and was admitted to the ICU for closer monitoring   Was treated in the normal stroke pathway fashion with ASA, permissive hypertension, statin   On admission he was also noted to be in AFib with RVR which was new onset  [facility] was started on Cardizem drip and Cardiology was consulted  [facility] was transitioned from Cardizem drip to IV digoxin   He was started on IV heparin for anticoagulation   After his stroke he had severe oropharyngeal dysphagia   VBSS showed aspiration and he was started on a modified diet however with that he was still having coughing and choking spells and was not taking in adequate calories   Discussion was had with the family regarding his need for nutrition   Patient and family agreed to a short-term PEG to allow him some nutrition while he weeks to see if dysphagia improved   Peg was supposed to be placed however patient became septic with pneumonia concerning for aspiration   His respiratory status worsened with hypoxic respiratory failure   He was placed on BiPAP and was not improving   Discussion was had with the patient and the family regarding his guarded prognosis and multiple medical issues   Patient decided against any further treatment and wanted to start hospice care   His family was in agreement with his decision  Aditya Jones is admitted for inpatient hospice    Please see above list of diagnoses and related plan for additional information  Condition at Discharge:        Discharge Day Visit / Exam:     Subjective:    Vitals: Blood Pressure: 111/65 (19)  Pulse: 89 (19)  Temperature: 98 8 °F (37 1 °C) (19)  Temp Source: Tympanic (19)  Respirations: (!) 24 (19)  Height: 5' 10" (177 8 cm) (19 1640)  Weight - Scale: 79 4 kg (175 lb) (19 1640)  SpO2: (!) 89 % (19)  Exam:   Physical Exam      Discharge instructions/Information to patient and family:   See after visit summary for information provided to patient and family  Provisions for Follow-Up Care:  See after visit summary for information related to follow-up care and any pertinent home health orders  Disposition:     Other:     Planned Readmission: no     Discharge Statement:  I spent >30 minutes discharging the patient   This time was spent on the day of discharge  I had direct contact with the patient on the day of discharge  Greater than 50% of the total time was spent examining patient, answering all patient questions, arranging and discussing plan of care with patient as well as directly providing post-discharge instructions  Additional time then spent on discharge activities  Discharge Medications:  See after visit summary for reconciled discharge medications provided to patient and family        ** Please Note: This note has been constructed using a voice recognition system **

## 2019-07-29 NOTE — NURSING NOTE
1345- pt ceased breath  Pt pronounced at 1345 by Dr Renetta Aldana  Pt's daughter and daughter-in-law were both present at bedside  [employer] notified, they released the body due to age  [facility]  home will handle all arrangements  Daughter called  home  Hospice [facility] notified

## 2019-07-29 NOTE — PLAN OF CARE
Problem: Potential for Falls  Goal: Patient will remain free of falls  Description  INTERVENTIONS:  - Assess patient frequently for physical needs  -  Identify cognitive and physical deficits and behaviors that affect risk of falls    -  [place] fall precautions as indicated by assessment   - Educate patient/family on patient safety including physical limitations  - Instruct patient to call for assistance with activity based on assessment  - Modify environment to reduce risk of injury  - Consider OT/PT consult to assist with strengthening/mobility  Outcome: Progressing     Problem: Prexisting or High Potential for Compromised Skin Integrity  Goal: Skin integrity is maintained or improved  Description  INTERVENTIONS:  - Identify patients at risk for skin breakdown  - Assess and monitor skin integrity  - Assess and monitor nutrition and hydration status  - Monitor labs (i e  albumin)  - Assess for incontinence   - Turn and reposition patient  - Assist with mobility/ambulation  - Relieve pressure over bony prominences  - Avoid friction and shearing  - Provide appropriate hygiene as needed including keeping skin clean and dry  - Evaluate need for skin moisturizer/barrier cream  - Collaborate with interdisciplinary team (i e  Nutrition, Rehabilitation, etc )   - Patient/family teaching  Outcome: Progressing     Problem: COPING  Goal: Pt/Family able to verbalize concerns and demonstrate effective coping strategies  Description  INTERVENTIONS:  - Assist patient/family to identify coping skills, available support systems and cultural and spiritual values  - Provide emotional support, including active listening and acknowledgement of concerns of patient and caregivers  - Reduce environmental stimuli, as able  - Provide patient education  - Assess for spiritual pain/suffering and initiate spiritual care, including notification of Pastoral Care or eulalia based community as needed  - Assess effectiveness of coping strategies  Outcome: Progressing     Problem: DEATH & DYING  Goal: Pt/Family communicate acceptance of impending death and expresses psychological comfort and peace  Description  INTERVENTIONS:  - Assess patient/family anxiety and grief process related to end of life issues  - Provide emotional, spiritual and psychosocial support  - Provide information about the patients health status with consideration of family and cultural values  - Communicate willingness to discuss death and facilitate grief process  with patient/family as appropriate  - Emphasize sustaining relationships within family system and community, or eulalia/spiritual traditions  - Initiate Spiritual Care, Pastoral care or other ancillary consults as needed  - Refer to community support groups as appropriate  Outcome: Progressing     Problem: RESPIRATORY - ADULT  Goal: Achieves optimal ventilation and oxygenation  Description  INTERVENTIONS:  - Assess for changes in respiratory status  - Assess for changes in mentation and behavior  - Position to facilitate oxygenation and minimize respiratory effort  - Oxygen administration by appropriate delivery method based on oxygen saturation (per order) or ABGs  - Initiate smoking cessation education as indicated  - Encourage broncho-pulmonary hygiene including cough, deep breathe, Incentive Spirometry  - Assess the need for suctioning and aspirate as needed  - Assess and instruct to report SOB or any respiratory difficulty  - Respiratory Therapy support as indicated  Outcome: Progressing

## 2019-07-29 NOTE — PLAN OF CARE
Problem: Potential for Falls  Goal: Patient will remain free of falls  Description  INTERVENTIONS:  - Assess patient frequently for physical needs  -  Identify cognitive and physical deficits and behaviors that affect risk of falls    -  [place] fall precautions as indicated by assessment   - Educate patient/family on patient safety including physical limitations  - Instruct patient to call for assistance with activity based on assessment  - Modify environment to reduce risk of injury  - Consider OT/PT consult to assist with strengthening/mobility  Outcome: Completed     Problem: Prexisting or High Potential for Compromised Skin Integrity  Goal: Skin integrity is maintained or improved  Description  INTERVENTIONS:  - Identify patients at risk for skin breakdown  - Assess and monitor skin integrity  - Assess and monitor nutrition and hydration status  - Monitor labs (i e  albumin)  - Assess for incontinence   - Turn and reposition patient  - Assist with mobility/ambulation  - Relieve pressure over bony prominences  - Avoid friction and shearing  - Provide appropriate hygiene as needed including keeping skin clean and dry  - Evaluate need for skin moisturizer/barrier cream  - Collaborate with interdisciplinary team (i e  Nutrition, Rehabilitation, etc )   - Patient/family teaching  Outcome: Completed     Problem: COPING  Goal: Pt/Family able to verbalize concerns and demonstrate effective coping strategies  Description  INTERVENTIONS:  - Assist patient/family to identify coping skills, available support systems and cultural and spiritual values  - Provide emotional support, including active listening and acknowledgement of concerns of patient and caregivers  - Reduce environmental stimuli, as able  - Provide patient education  - Assess for spiritual pain/suffering and initiate spiritual care, including notification of Pastoral Care or eulalia based community as needed  - Assess effectiveness of coping strategies  Outcome: Completed     Problem: DEATH & DYING  Goal: Pt/Family communicate acceptance of impending death and expresses psychological comfort and peace  Description  INTERVENTIONS:  - Assess patient/family anxiety and grief process related to end of life issues  - Provide emotional, spiritual and psychosocial support  - Provide information about the patients health status with consideration of family and cultural values  - Communicate willingness to discuss death and facilitate grief process  with patient/family as appropriate  - Emphasize sustaining relationships within family system and community, or eulalia/spiritual traditions  - Initiate Spiritual Care, Pastoral care or other ancillary consults as needed  - Refer to community support groups as appropriate  Outcome: Completed     Problem: RESPIRATORY - ADULT  Goal: Achieves optimal ventilation and oxygenation  Description  INTERVENTIONS:  - Assess for changes in respiratory status  - Assess for changes in mentation and behavior  - Position to facilitate oxygenation and minimize respiratory effort  - Oxygen administration by appropriate delivery method based on oxygen saturation (per order) or ABGs  - Initiate smoking cessation education as indicated  - Encourage broncho-pulmonary hygiene including cough, deep breathe, Incentive Spirometry  - Assess the need for suctioning and aspirate as needed  - Assess and instruct to report SOB or any respiratory difficulty  - Respiratory Therapy support as indicated  Outcome: Completed

## 2021-10-05 NOTE — TELEPHONE ENCOUNTER
Please send with 6 refills  Detail Level: Generalized Detail Level: Detailed Detail Level: Simple Detail Level: Zone

## 2022-03-09 NOTE — PLAN OF CARE
Problem: RESPIRATORY - ADULT  Goal: Achieves optimal ventilation and oxygenation  Description  INTERVENTIONS:  - Assess for changes in respiratory status  - Assess for changes in mentation and behavior  - Position to facilitate oxygenation and minimize respiratory effort  - Oxygen administration by appropriate delivery method based on oxygen saturation (per order) or ABGs  - Initiate smoking cessation education as indicated  - Encourage broncho-pulmonary hygiene including cough, deep breathe, Incentive Spirometry  - Assess the need for suctioning and aspirate as needed  - Assess and instruct to report SOB or any respiratory difficulty  - Respiratory Therapy support as indicated  Outcome: Progressing No
